# Patient Record
Sex: FEMALE | Race: WHITE | Employment: OTHER | URBAN - METROPOLITAN AREA
[De-identification: names, ages, dates, MRNs, and addresses within clinical notes are randomized per-mention and may not be internally consistent; named-entity substitution may affect disease eponyms.]

---

## 2017-12-01 ENCOUNTER — HOSPITAL ENCOUNTER (EMERGENCY)
Age: 82
Discharge: HOME | End: 2017-12-01
Attending: EMERGENCY MEDICINE
Payer: MEDICARE

## 2017-12-01 VITALS
BODY MASS INDEX: 18.36 KG/M2 | RESPIRATION RATE: 15 BRPM | DIASTOLIC BLOOD PRESSURE: 68 MMHG | HEIGHT: 60 IN | HEART RATE: 67 BPM | SYSTOLIC BLOOD PRESSURE: 167 MMHG | OXYGEN SATURATION: 97 % | WEIGHT: 93.5 LBS

## 2017-12-01 DIAGNOSIS — I16.0 HYPERTENSIVE URGENCY, MALIGNANT: Primary | ICD-10-CM

## 2017-12-01 LAB
ANION GAP SERPL CALC-SCNC: 7 MMOL/L (ref 5–15)
ATRIAL RATE: 89 BPM
BASOPHILS # BLD: 0.1 K/UL (ref 0–0.1)
BASOPHILS NFR BLD: 1 % (ref 0–1)
BUN SERPL-MCNC: 18 MG/DL (ref 6–20)
BUN/CREAT SERPL: 20 (ref 12–20)
CALCIUM SERPL-MCNC: 9.2 MG/DL (ref 8.5–10.1)
CALCULATED R AXIS, ECG10: 84 DEGREES
CALCULATED T AXIS, ECG11: -138 DEGREES
CHLORIDE SERPL-SCNC: 103 MMOL/L (ref 97–108)
CO2 SERPL-SCNC: 29 MMOL/L (ref 21–32)
CREAT SERPL-MCNC: 0.88 MG/DL (ref 0.55–1.02)
DIAGNOSIS, 93000: NORMAL
DIFFERENTIAL METHOD BLD: ABNORMAL
EOSINOPHIL # BLD: 0.1 K/UL (ref 0–0.4)
EOSINOPHIL NFR BLD: 2 % (ref 0–7)
ERYTHROCYTE [DISTWIDTH] IN BLOOD BY AUTOMATED COUNT: 15 % (ref 11.5–14.5)
GLUCOSE SERPL-MCNC: 109 MG/DL (ref 65–100)
HCT VFR BLD AUTO: 37 % (ref 35–47)
HGB BLD-MCNC: 11.3 G/DL (ref 11.5–16)
LYMPHOCYTES # BLD: 2.5 K/UL (ref 0.8–3.5)
LYMPHOCYTES NFR BLD: 27 % (ref 12–49)
MCH RBC QN AUTO: 27.4 PG (ref 26–34)
MCHC RBC AUTO-ENTMCNC: 30.5 G/DL (ref 30–36.5)
MCV RBC AUTO: 89.6 FL (ref 80–99)
MONOCYTES # BLD: 0.7 K/UL (ref 0–1)
MONOCYTES NFR BLD: 7 % (ref 5–13)
NEUTS SEG # BLD: 5.8 K/UL (ref 1.8–8)
NEUTS SEG NFR BLD: 63 % (ref 32–75)
PLATELET # BLD AUTO: 263 K/UL (ref 150–400)
POTASSIUM SERPL-SCNC: 3.9 MMOL/L (ref 3.5–5.1)
Q-T INTERVAL, ECG07: 304 MS
QRS DURATION, ECG06: 76 MS
QTC CALCULATION (BEZET), ECG08: 405 MS
RBC # BLD AUTO: 4.13 M/UL (ref 3.8–5.2)
SODIUM SERPL-SCNC: 139 MMOL/L (ref 136–145)
TROPONIN I SERPL-MCNC: <0.04 NG/ML
VENTRICULAR RATE, ECG03: 107 BPM
WBC # BLD AUTO: 9.1 K/UL (ref 3.6–11)

## 2017-12-01 PROCEDURE — 96374 THER/PROPH/DIAG INJ IV PUSH: CPT

## 2017-12-01 PROCEDURE — 99284 EMERGENCY DEPT VISIT MOD MDM: CPT

## 2017-12-01 PROCEDURE — 93005 ELECTROCARDIOGRAM TRACING: CPT

## 2017-12-01 PROCEDURE — 85025 COMPLETE CBC W/AUTO DIFF WBC: CPT | Performed by: EMERGENCY MEDICINE

## 2017-12-01 PROCEDURE — 74011250637 HC RX REV CODE- 250/637: Performed by: EMERGENCY MEDICINE

## 2017-12-01 PROCEDURE — 84484 ASSAY OF TROPONIN QUANT: CPT | Performed by: EMERGENCY MEDICINE

## 2017-12-01 PROCEDURE — 36415 COLL VENOUS BLD VENIPUNCTURE: CPT | Performed by: EMERGENCY MEDICINE

## 2017-12-01 PROCEDURE — 80048 BASIC METABOLIC PNL TOTAL CA: CPT | Performed by: EMERGENCY MEDICINE

## 2017-12-01 PROCEDURE — 74011000250 HC RX REV CODE- 250: Performed by: EMERGENCY MEDICINE

## 2017-12-01 RX ORDER — DILTIAZEM HYDROCHLORIDE 180 MG/1
180 CAPSULE, COATED, EXTENDED RELEASE ORAL
Status: COMPLETED | OUTPATIENT
Start: 2017-12-01 | End: 2017-12-01

## 2017-12-01 RX ORDER — LISINOPRIL 10 MG/1
10 TABLET ORAL DAILY
COMMUNITY

## 2017-12-01 RX ORDER — DILTIAZEM HYDROCHLORIDE 5 MG/ML
10 INJECTION INTRAVENOUS
Status: COMPLETED | OUTPATIENT
Start: 2017-12-01 | End: 2017-12-01

## 2017-12-01 RX ORDER — MELATONIN 5 MG
5 CAPSULE ORAL
COMMUNITY
End: 2018-01-01

## 2017-12-01 RX ADMIN — DILTIAZEM HYDROCHLORIDE 10 MG: 5 INJECTION INTRAVENOUS at 11:41

## 2017-12-01 RX ADMIN — DILTIAZEM HYDROCHLORIDE 180 MG: 180 CAPSULE, COATED, EXTENDED RELEASE ORAL at 11:35

## 2017-12-01 NOTE — DISCHARGE INSTRUCTIONS
Acute High Blood Pressure: Care Instructions  Your Care Instructions    Acute high blood pressure is very high blood pressure. It's a serious problem. Very high blood pressure can damage your brain, heart, eyes, and kidneys. You may have been given medicines to lower your blood pressure. You may have gotten them as pills or through a needle in one of your veins. This is called an IV. And maybe you were given other medicines too. These can be needed when high blood pressure causes other problems. To keep your blood pressure at a lower level, you may need to make healthy lifestyle changes. And you will probably need to take medicines. Be sure to follow up with your doctor about your blood pressure and what you can do about it. Follow-up care is a key part of your treatment and safety. Be sure to make and go to all appointments, and call your doctor if you are having problems. It's also a good idea to know your test results and keep a list of the medicines you take. How can you care for yourself at home? · See your doctor as often as he or she recommends. This is to make sure your blood pressure is under control. You will probably go at least 2 times a year. But it may be more often at first.  · Take your blood pressure medicine exactly as prescribed. You may take one or more types. They include diuretics, beta-blockers, ACE inhibitors, calcium channel blockers, and angiotensin II receptor blockers. Call your doctor if you think you are having a problem with your medicine. · If you take blood pressure medicine, talk to your doctor before you take decongestants or anti-inflammatory medicine, such as ibuprofen. These can raise blood pressure. · Learn how to check your blood pressure at home. Check it often. · Ask your doctor if it's okay to drink alcohol. · Talk to your doctor about lifestyle changes that can help blood pressure. These include being active and not smoking.   When should you call for help?  Call 911 anytime you think you may need emergency care. This may mean having symptoms that suggest that your blood pressure is causing a serious heart or blood vessel problem. Your blood pressure may be over 180/110. ? For example, call 911 if:  ? · You have symptoms of a heart attack. These may include:  ¨ Chest pain or pressure, or a strange feeling in the chest.  ¨ Sweating. ¨ Shortness of breath. ¨ Nausea or vomiting. ¨ Pain, pressure, or a strange feeling in the back, neck, jaw, or upper belly or in one or both shoulders or arms. ¨ Lightheadedness or sudden weakness. ¨ A fast or irregular heartbeat. ? · You have symptoms of a stroke. These may include:  ¨ Sudden numbness, tingling, weakness, or loss of movement in your face, arm, or leg, especially on only one side of your body. ¨ Sudden vision changes. ¨ Sudden trouble speaking. ¨ Sudden confusion or trouble understanding simple statements. ¨ Sudden problems with walking or balance. ¨ A sudden, severe headache that is different from past headaches. ? · You have severe back or belly pain. ?Do not wait until your blood pressure comes down on its own. Get help right away. ?Call your doctor now or seek immediate care if:  ? · Your blood pressure is much higher than normal (such as 180/110 or higher), but you don't have symptoms. ? · You think high blood pressure is causing symptoms, such as:  ¨ Severe headache. ¨ Blurry vision. ? Watch closely for changes in your health, and be sure to contact your doctor if:  ? · Your blood pressure measures 140/90 or higher at least 2 times. That means the top number is 140 or higher or the bottom number is 90 or higher, or both. ? · You think you may be having side effects from your blood pressure medicine. ? · Your blood pressure is usually normal, but it goes above normal at least 2 times. Where can you learn more? Go to http://froilan-chris.info/.   Enter X016 in the search box to learn more about \"Acute High Blood Pressure: Care Instructions. \"  Current as of: September 21, 2016  Content Version: 11.4  © 3100-7334 [employer], [employer]. Care instructions adapted under license by [employer] (which disclaims liability or warranty for this information). If you have questions about a medical condition or this instruction, always ask your healthcare professional. [place] [address] any warranty or liability for your use of this information.

## 2017-12-01 NOTE — ED TRIAGE NOTES
Patient ambulatory to ED treatment area with steady gait for complaint of \"I was sent here by my PCP because my blood pressure has bene high for about two or three weeks. \" Patient reports shortness of breath and dizziness. Patient reports \"I just do not feel well. \" Denies any fever. Patient reports some coughing. Denies productive cough. Patient's daughter reports that Rolanda Velazquez stopped taking cardia two weeks ago and she was not stop this medication. Lisinopril was started at this time. \"

## 2017-12-01 NOTE — ED PROVIDER NOTES
Patient is a 80 y.o. female presenting with hypertension. The history is provided by the patient. Hypertension    This is a recurrent problem. The current episode started more than 1 week ago. Associated symptoms include shortness of breath. Pertinent negatives include no chest pain, no anxiety, no confusion, no headaches, no neck pain, no dizziness, no nausea and no vomiting. patient was to be taking lisinopril in addition to her cardizem, but she stopped the cardizem by mistake a couple weeks ago    Past Medical History:   Diagnosis Date    Asthma     Cancer ([facility] [address] 2011    LEFT breast     Diabetes ([facility] [address]     Hypertension        Past Surgical History:   Procedure Laterality Date    HX BREAST LUMPECTOMY  2011    LEFT breast     HX LAP CHOLECYSTECTOMY           Family History:   Problem Relation Age of Onset    Cancer Mother      ? TYPE CA    Diabetes Father     Heart Disease Father     Hypertension Father     Lung Disease Paternal Grandfather     Asthma Neg Hx     Malignant Hyperthermia Neg Hx     Pseudocholinesterase Deficiency Neg Hx     Delayed Awakening Neg Hx     Post-op Nausea/Vomiting Neg Hx        Social History     Social History    Marital status:      Spouse name: N/A    Number of children: N/A    Years of education: N/A     Occupational History    Not on file. Social History Main Topics    Smoking status: Never Smoker    Smokeless tobacco: Never Used    Alcohol use No    Drug use: Not on file    Sexual activity: Not on file     Other Topics Concern    Not on file     Social History Narrative         ALLERGIES: Adhesive    Review of Systems   Constitutional: Positive for fatigue. Negative for chills and fever. Respiratory: Positive for shortness of breath. Negative for chest tightness and wheezing. Cardiovascular: Negative for chest pain. Gastrointestinal: Negative for abdominal pain, nausea and vomiting.    Musculoskeletal: Negative for back pain and neck pain.   Neurological: Negative for dizziness and headaches. Psychiatric/Behavioral: Negative for confusion. All other systems reviewed and are negative. Vitals:    17 1118 17 1130 17 1135 17 1200   BP:  (!) 185/108 (!) 185/108 167/68   Pulse: 100 91 85 67   Resp: 19 18  15   SpO2: 99% 100%  97%   Weight:       Height:                Physical Exam   Constitutional: She is oriented to person, place, and time. She appears well-developed and well-nourished. No distress. HENT:   Head: Normocephalic and atraumatic. Cardiovascular: Intact distal pulses. An irregularly irregular rhythm present. Tachycardia present. Pulmonary/Chest: Effort normal and breath sounds normal. No respiratory distress. She has no wheezes. Abdominal: Soft. Bowel sounds are normal. There is no tenderness. Musculoskeletal: Normal range of motion. She exhibits no edema or deformity. Neurological: She is alert and oriented to person, place, and time. Skin: Skin is warm and dry. She is not diaphoretic. Psychiatric: She has a normal mood and affect. Nursing note and vitals reviewed. MDM  Number of Diagnoses or Management Options  Hypertensive urgency, malignant:   Diagnosis management comments: Patient with HTN sent from PCP office for further eval and care of her pressure. Patient had an error in how she was taking her medications. Check labs, give meds for BP and re-eval.  Give IV diltiazem and her oral dose and re-eval    1220pm - no EMC at this time, d/c home. Patient feels better and daughter here with her to take her home. EK17 @ 11:10 unchanged from previous tracings, atrial fibrillation, rate 107, st and t wave abnormalities. No acute process.        Amount and/or Complexity of Data Reviewed  Clinical lab tests: ordered and reviewed  Obtain history from someone other than the patient: yes (daughter)  Independent visualization of images, tracings, or specimens: yes    Critical Care  Total time providing critical care: 30-74 minutes (Total critical care time spent exclusive of procedures:  30 minutes)    Patient Progress  Patient progress: improved    ED Course       Procedures

## 2017-12-01 NOTE — ED NOTES
Pt resting on the stretcher in no distress and talking with family at bedside. V/S reassessed. All results available for review and awaiting further care mgmt. Will continue to monitor.

## 2018-01-01 ENCOUNTER — HOME CARE VISIT (OUTPATIENT)
Dept: SCHEDULING | Facility: HOME HEALTH | Age: 83
End: 2018-01-01
Payer: MEDICARE

## 2018-01-01 ENCOUNTER — HOSPITAL ENCOUNTER (INPATIENT)
Age: 83
LOS: 6 days | Discharge: HOME | DRG: 381 | End: 2018-01-11
Attending: EMERGENCY MEDICINE | Admitting: FAMILY MEDICINE
Payer: MEDICARE

## 2018-01-01 ENCOUNTER — APPOINTMENT (OUTPATIENT)
Dept: GENERAL RADIOLOGY | Age: 83
DRG: 291 | End: 2018-01-01
Attending: EMERGENCY MEDICINE
Payer: MEDICARE

## 2018-01-01 ENCOUNTER — APPOINTMENT (OUTPATIENT)
Dept: CT IMAGING | Age: 83
DRG: 381 | End: 2018-01-01
Attending: FAMILY MEDICINE
Payer: MEDICARE

## 2018-01-01 ENCOUNTER — HOSPITAL ENCOUNTER (INPATIENT)
Age: 83
LOS: 3 days | Discharge: HOME HEALTH | DRG: 291 | End: 2019-01-01
Attending: EMERGENCY MEDICINE | Admitting: INTERNAL MEDICINE
Payer: MEDICARE

## 2018-01-01 ENCOUNTER — HOME CARE VISIT (OUTPATIENT)
Dept: HOME HEALTH SERVICES | Facility: HOME HEALTH | Age: 83
End: 2018-01-01
Payer: MEDICARE

## 2018-01-01 ENCOUNTER — APPOINTMENT (OUTPATIENT)
Dept: GENERAL RADIOLOGY | Age: 83
DRG: 381 | End: 2018-01-01
Attending: INTERNAL MEDICINE
Payer: MEDICARE

## 2018-01-01 ENCOUNTER — HOME HEALTH ADMISSION (OUTPATIENT)
Dept: HOME HEALTH SERVICES | Facility: HOME HEALTH | Age: 83
End: 2018-01-01
Payer: MEDICARE

## 2018-01-01 ENCOUNTER — APPOINTMENT (OUTPATIENT)
Dept: GENERAL RADIOLOGY | Age: 83
DRG: 381 | End: 2018-01-01
Attending: EMERGENCY MEDICINE
Payer: MEDICARE

## 2018-01-01 ENCOUNTER — HOME HEALTH ADMISSION (OUTPATIENT)
Dept: HOME HEALTH SERVICES | Facility: HOME HEALTH | Age: 83
End: 2018-01-01

## 2018-01-01 VITALS
TEMPERATURE: 97.6 F | HEART RATE: 80 BPM | SYSTOLIC BLOOD PRESSURE: 129 MMHG | RESPIRATION RATE: 18 BRPM | OXYGEN SATURATION: 98 % | DIASTOLIC BLOOD PRESSURE: 66 MMHG

## 2018-01-01 VITALS
RESPIRATION RATE: 18 BRPM | DIASTOLIC BLOOD PRESSURE: 60 MMHG | SYSTOLIC BLOOD PRESSURE: 110 MMHG | TEMPERATURE: 97.4 F | HEART RATE: 84 BPM | OXYGEN SATURATION: 95 %

## 2018-01-01 VITALS
DIASTOLIC BLOOD PRESSURE: 57 MMHG | TEMPERATURE: 97.3 F | OXYGEN SATURATION: 95 % | SYSTOLIC BLOOD PRESSURE: 118 MMHG | RESPIRATION RATE: 18 BRPM | HEART RATE: 91 BPM

## 2018-01-01 VITALS
OXYGEN SATURATION: 98 % | SYSTOLIC BLOOD PRESSURE: 122 MMHG | RESPIRATION RATE: 16 BRPM | HEART RATE: 77 BPM | DIASTOLIC BLOOD PRESSURE: 80 MMHG | TEMPERATURE: 98.2 F

## 2018-01-01 VITALS
DIASTOLIC BLOOD PRESSURE: 74 MMHG | RESPIRATION RATE: 16 BRPM | TEMPERATURE: 97.7 F | SYSTOLIC BLOOD PRESSURE: 124 MMHG | OXYGEN SATURATION: 99 % | HEART RATE: 66 BPM

## 2018-01-01 VITALS
RESPIRATION RATE: 18 BRPM | SYSTOLIC BLOOD PRESSURE: 120 MMHG | SYSTOLIC BLOOD PRESSURE: 128 MMHG | DIASTOLIC BLOOD PRESSURE: 78 MMHG | TEMPERATURE: 98 F | RESPIRATION RATE: 18 BRPM | HEART RATE: 88 BPM | HEART RATE: 78 BPM | OXYGEN SATURATION: 99 % | OXYGEN SATURATION: 99 % | DIASTOLIC BLOOD PRESSURE: 70 MMHG | TEMPERATURE: 97 F

## 2018-01-01 VITALS
HEART RATE: 77 BPM | DIASTOLIC BLOOD PRESSURE: 70 MMHG | OXYGEN SATURATION: 98 % | SYSTOLIC BLOOD PRESSURE: 120 MMHG | TEMPERATURE: 97.1 F | RESPIRATION RATE: 16 BRPM

## 2018-01-01 VITALS
OXYGEN SATURATION: 99 % | SYSTOLIC BLOOD PRESSURE: 130 MMHG | RESPIRATION RATE: 16 BRPM | TEMPERATURE: 97.7 F | DIASTOLIC BLOOD PRESSURE: 70 MMHG | HEART RATE: 64 BPM

## 2018-01-01 VITALS
RESPIRATION RATE: 16 BRPM | TEMPERATURE: 97.5 F | SYSTOLIC BLOOD PRESSURE: 120 MMHG | OXYGEN SATURATION: 96 % | HEART RATE: 67 BPM | DIASTOLIC BLOOD PRESSURE: 60 MMHG

## 2018-01-01 VITALS
HEART RATE: 84 BPM | OXYGEN SATURATION: 95 % | DIASTOLIC BLOOD PRESSURE: 62 MMHG | TEMPERATURE: 98.2 F | SYSTOLIC BLOOD PRESSURE: 142 MMHG | RESPIRATION RATE: 18 BRPM

## 2018-01-01 VITALS
OXYGEN SATURATION: 97 % | SYSTOLIC BLOOD PRESSURE: 126 MMHG | SYSTOLIC BLOOD PRESSURE: 120 MMHG | HEART RATE: 78 BPM | RESPIRATION RATE: 18 BRPM | TEMPERATURE: 97.9 F | BODY MASS INDEX: 16.01 KG/M2 | WEIGHT: 103 LBS | OXYGEN SATURATION: 97 % | HEIGHT: 63 IN | RESPIRATION RATE: 18 BRPM | DIASTOLIC BLOOD PRESSURE: 70 MMHG | WEIGHT: 90.39 LBS | DIASTOLIC BLOOD PRESSURE: 80 MMHG | TEMPERATURE: 97.9 F | HEART RATE: 67 BPM | BODY MASS INDEX: 18.25 KG/M2

## 2018-01-01 VITALS
HEIGHT: 63 IN | RESPIRATION RATE: 16 BRPM | SYSTOLIC BLOOD PRESSURE: 136 MMHG | BODY MASS INDEX: 18.11 KG/M2 | OXYGEN SATURATION: 90 % | TEMPERATURE: 97.6 F | HEART RATE: 98 BPM | WEIGHT: 102.2 LBS | DIASTOLIC BLOOD PRESSURE: 93 MMHG

## 2018-01-01 VITALS
DIASTOLIC BLOOD PRESSURE: 70 MMHG | RESPIRATION RATE: 18 BRPM | OXYGEN SATURATION: 95 % | RESPIRATION RATE: 18 BRPM | DIASTOLIC BLOOD PRESSURE: 68 MMHG | HEART RATE: 77 BPM | TEMPERATURE: 98.2 F | SYSTOLIC BLOOD PRESSURE: 135 MMHG | OXYGEN SATURATION: 98 % | SYSTOLIC BLOOD PRESSURE: 118 MMHG | HEART RATE: 70 BPM | TEMPERATURE: 98.3 F

## 2018-01-01 VITALS
TEMPERATURE: 97.9 F | SYSTOLIC BLOOD PRESSURE: 128 MMHG | HEART RATE: 84 BPM | DIASTOLIC BLOOD PRESSURE: 75 MMHG | OXYGEN SATURATION: 97 % | RESPIRATION RATE: 18 BRPM

## 2018-01-01 VITALS
SYSTOLIC BLOOD PRESSURE: 110 MMHG | HEART RATE: 80 BPM | TEMPERATURE: 97.3 F | RESPIRATION RATE: 18 BRPM | DIASTOLIC BLOOD PRESSURE: 70 MMHG | OXYGEN SATURATION: 92 %

## 2018-01-01 VITALS
HEART RATE: 83 BPM | TEMPERATURE: 96.6 F | SYSTOLIC BLOOD PRESSURE: 118 MMHG | RESPIRATION RATE: 18 BRPM | DIASTOLIC BLOOD PRESSURE: 70 MMHG | OXYGEN SATURATION: 97 %

## 2018-01-01 VITALS
HEART RATE: 80 BPM | TEMPERATURE: 97.6 F | RESPIRATION RATE: 16 BRPM | SYSTOLIC BLOOD PRESSURE: 136 MMHG | DIASTOLIC BLOOD PRESSURE: 78 MMHG | OXYGEN SATURATION: 98 %

## 2018-01-01 VITALS
SYSTOLIC BLOOD PRESSURE: 122 MMHG | OXYGEN SATURATION: 92 % | DIASTOLIC BLOOD PRESSURE: 60 MMHG | RESPIRATION RATE: 18 BRPM | HEART RATE: 66 BPM | TEMPERATURE: 97.4 F

## 2018-01-01 VITALS
SYSTOLIC BLOOD PRESSURE: 118 MMHG | TEMPERATURE: 98.2 F | RESPIRATION RATE: 18 BRPM | DIASTOLIC BLOOD PRESSURE: 68 MMHG | HEART RATE: 77 BPM | OXYGEN SATURATION: 98 %

## 2018-01-01 VITALS
SYSTOLIC BLOOD PRESSURE: 128 MMHG | TEMPERATURE: 98.3 F | HEART RATE: 68 BPM | RESPIRATION RATE: 17 BRPM | DIASTOLIC BLOOD PRESSURE: 62 MMHG | OXYGEN SATURATION: 98 %

## 2018-01-01 VITALS
RESPIRATION RATE: 16 BRPM | DIASTOLIC BLOOD PRESSURE: 65 MMHG | SYSTOLIC BLOOD PRESSURE: 110 MMHG | TEMPERATURE: 98 F | HEART RATE: 70 BPM | OXYGEN SATURATION: 96 %

## 2018-01-01 VITALS
DIASTOLIC BLOOD PRESSURE: 74 MMHG | HEART RATE: 78 BPM | OXYGEN SATURATION: 95 % | RESPIRATION RATE: 17 BRPM | TEMPERATURE: 97.3 F | SYSTOLIC BLOOD PRESSURE: 140 MMHG

## 2018-01-01 DIAGNOSIS — R55 SYNCOPE AND COLLAPSE: Primary | ICD-10-CM

## 2018-01-01 DIAGNOSIS — J18.9 COMMUNITY ACQUIRED PNEUMONIA, UNSPECIFIED LATERALITY: ICD-10-CM

## 2018-01-01 DIAGNOSIS — J90 PLEURAL EFFUSION: ICD-10-CM

## 2018-01-01 DIAGNOSIS — D64.9 ANEMIA, UNSPECIFIED TYPE: Primary | ICD-10-CM

## 2018-01-01 LAB
25(OH)D3 SERPL-MCNC: 25.3 NG/ML (ref 30–100)
ABO + RH BLD: NORMAL
ABO + RH BLD: NORMAL
ALBUMIN SERPL-MCNC: 3.5 G/DL (ref 3.5–5)
ALBUMIN SERPL-MCNC: 3.7 G/DL (ref 3.5–5)
ALBUMIN SERPL-MCNC: 3.8 G/DL (ref 3.5–5)
ALBUMIN/GLOB SERPL: 0.9 {RATIO} (ref 1.1–2.2)
ALBUMIN/GLOB SERPL: 1 {RATIO} (ref 1.1–2.2)
ALBUMIN/GLOB SERPL: 1.1 {RATIO} (ref 1.1–2.2)
ALP SERPL-CCNC: 116 U/L (ref 45–117)
ALP SERPL-CCNC: 87 U/L (ref 45–117)
ALP SERPL-CCNC: 99 U/L (ref 45–117)
ALT SERPL-CCNC: 20 U/L (ref 12–78)
ALT SERPL-CCNC: 23 U/L (ref 12–78)
ALT SERPL-CCNC: 28 U/L (ref 12–78)
ANION GAP SERPL CALC-SCNC: 10 MMOL/L (ref 5–15)
ANION GAP SERPL CALC-SCNC: 10 MMOL/L (ref 5–15)
ANION GAP SERPL CALC-SCNC: 8 MMOL/L (ref 5–15)
ANION GAP SERPL CALC-SCNC: 9 MMOL/L (ref 5–15)
APPEARANCE UR: CLEAR
APTT PPP: 22.7 SEC (ref 22.1–32.5)
ARTERIAL PATENCY WRIST A: NO
ARTERIAL PATENCY WRIST A: YES
ARTERIAL PATENCY WRIST A: YES
AST SERPL-CCNC: 23 U/L (ref 15–37)
AST SERPL-CCNC: 25 U/L (ref 15–37)
AST SERPL-CCNC: 28 U/L (ref 15–37)
ATRIAL RATE: 166 BPM
ATRIAL RATE: 82 BPM
ATRIAL RATE: 83 BPM
ATRIAL RATE: 85 BPM
BACTERIA SPEC CULT: NORMAL
BACTERIA URNS QL MICRO: NEGATIVE /HPF
BASE DEFICIT BLD-SCNC: 10 MMOL/L
BASE DEFICIT BLD-SCNC: 8 MMOL/L
BASE EXCESS BLD CALC-SCNC: 0 MMOL/L
BASOPHILS # BLD: 0.1 K/UL (ref 0–0.1)
BASOPHILS # BLD: 0.1 K/UL (ref 0–0.1)
BASOPHILS NFR BLD: 1 % (ref 0–1)
BASOPHILS NFR BLD: 1 % (ref 0–1)
BDY SITE: ABNORMAL
BILIRUB SERPL-MCNC: 0.4 MG/DL (ref 0.2–1)
BILIRUB SERPL-MCNC: 0.5 MG/DL (ref 0.2–1)
BILIRUB SERPL-MCNC: 0.8 MG/DL (ref 0.2–1)
BILIRUB UR QL: NEGATIVE
BLD PROD TYP BPU: NORMAL
BLOOD GROUP ANTIBODIES SERPL: NORMAL
BLOOD GROUP ANTIBODIES SERPL: NORMAL
BNP SERPL-MCNC: 3824 PG/ML (ref 0–450)
BPU ID: NORMAL
BUN SERPL-MCNC: 23 MG/DL (ref 6–20)
BUN SERPL-MCNC: 23 MG/DL (ref 6–20)
BUN SERPL-MCNC: 26 MG/DL (ref 6–20)
BUN SERPL-MCNC: 28 MG/DL (ref 6–20)
BUN SERPL-MCNC: 29 MG/DL (ref 6–20)
BUN SERPL-MCNC: 35 MG/DL (ref 6–20)
BUN/CREAT SERPL: 20 (ref 12–20)
BUN/CREAT SERPL: 21 (ref 12–20)
BUN/CREAT SERPL: 21 (ref 12–20)
BUN/CREAT SERPL: 24 (ref 12–20)
BUN/CREAT SERPL: 25 (ref 12–20)
BUN/CREAT SERPL: 33 (ref 12–20)
CALCIUM SERPL-MCNC: 8.4 MG/DL (ref 8.5–10.1)
CALCIUM SERPL-MCNC: 8.4 MG/DL (ref 8.5–10.1)
CALCIUM SERPL-MCNC: 8.8 MG/DL (ref 8.5–10.1)
CALCIUM SERPL-MCNC: 9 MG/DL (ref 8.5–10.1)
CALCIUM SERPL-MCNC: 9.2 MG/DL (ref 8.5–10.1)
CALCIUM SERPL-MCNC: 9.7 MG/DL (ref 8.5–10.1)
CALCULATED R AXIS, ECG10: 58 DEGREES
CALCULATED R AXIS, ECG10: 74 DEGREES
CALCULATED R AXIS, ECG10: 80 DEGREES
CALCULATED R AXIS, ECG10: 95 DEGREES
CALCULATED T AXIS, ECG11: -133 DEGREES
CALCULATED T AXIS, ECG11: -145 DEGREES
CALCULATED T AXIS, ECG11: -156 DEGREES
CALCULATED T AXIS, ECG11: -164 DEGREES
CC UR VC: NORMAL
CHLORIDE SERPL-SCNC: 103 MMOL/L (ref 97–108)
CHLORIDE SERPL-SCNC: 105 MMOL/L (ref 97–108)
CHLORIDE SERPL-SCNC: 106 MMOL/L (ref 97–108)
CHLORIDE SERPL-SCNC: 107 MMOL/L (ref 97–108)
CHLORIDE SERPL-SCNC: 108 MMOL/L (ref 97–108)
CHLORIDE SERPL-SCNC: 110 MMOL/L (ref 97–108)
CK SERPL-CCNC: 92 U/L (ref 26–192)
CO2 SERPL-SCNC: 21 MMOL/L (ref 21–32)
CO2 SERPL-SCNC: 22 MMOL/L (ref 21–32)
CO2 SERPL-SCNC: 24 MMOL/L (ref 21–32)
CO2 SERPL-SCNC: 25 MMOL/L (ref 21–32)
COLOR UR: ABNORMAL
COMMENT, HOLDF: NORMAL
COMMENT, HOLDF: NORMAL
CREAT SERPL-MCNC: 0.95 MG/DL (ref 0.55–1.02)
CREAT SERPL-MCNC: 1.05 MG/DL (ref 0.55–1.02)
CREAT SERPL-MCNC: 1.13 MG/DL (ref 0.55–1.02)
CREAT SERPL-MCNC: 1.14 MG/DL (ref 0.55–1.02)
CREAT SERPL-MCNC: 1.23 MG/DL (ref 0.55–1.02)
CREAT SERPL-MCNC: 1.36 MG/DL (ref 0.55–1.02)
CROSSMATCH RESULT,%XM: NORMAL
CRP SERPL HS-MCNC: >9.5 MG/L
DIAGNOSIS, 93000: NORMAL
DIFFERENTIAL METHOD BLD: ABNORMAL
DIGOXIN SERPL-MCNC: 0.9 NG/ML (ref 0.9–2)
DIGOXIN SERPL-MCNC: 1.1 NG/ML (ref 0.9–2)
EOSINOPHIL # BLD: 0 K/UL (ref 0–0.4)
EOSINOPHIL # BLD: 0.1 K/UL (ref 0–0.4)
EOSINOPHIL NFR BLD: 0 % (ref 0–7)
EOSINOPHIL NFR BLD: 1 % (ref 0–7)
EPITH CASTS URNS QL MICRO: ABNORMAL /LPF
ERYTHROCYTE [DISTWIDTH] IN BLOOD BY AUTOMATED COUNT: 15.5 % (ref 11.5–14.5)
ERYTHROCYTE [DISTWIDTH] IN BLOOD BY AUTOMATED COUNT: 15.6 % (ref 11.5–14.5)
ERYTHROCYTE [DISTWIDTH] IN BLOOD BY AUTOMATED COUNT: 21.6 % (ref 11.5–14.5)
ERYTHROCYTE [DISTWIDTH] IN BLOOD BY AUTOMATED COUNT: 21.6 % (ref 11.5–14.5)
ERYTHROCYTE [DISTWIDTH] IN BLOOD BY AUTOMATED COUNT: 21.7 % (ref 11.5–14.5)
ERYTHROCYTE [SEDIMENTATION RATE] IN BLOOD: 52 MM/HR (ref 0–30)
GAS FLOW.O2 O2 DELIVERY SYS: ABNORMAL L/MIN
GLOBULIN SER CALC-MCNC: 3.2 G/DL (ref 2–4)
GLOBULIN SER CALC-MCNC: 3.8 G/DL (ref 2–4)
GLOBULIN SER CALC-MCNC: 4.1 G/DL (ref 2–4)
GLUCOSE BLD STRIP.AUTO-MCNC: 109 MG/DL (ref 65–100)
GLUCOSE BLD STRIP.AUTO-MCNC: 165 MG/DL (ref 65–100)
GLUCOSE SERPL-MCNC: 107 MG/DL (ref 65–100)
GLUCOSE SERPL-MCNC: 120 MG/DL (ref 65–100)
GLUCOSE SERPL-MCNC: 121 MG/DL (ref 65–100)
GLUCOSE SERPL-MCNC: 138 MG/DL (ref 65–100)
GLUCOSE SERPL-MCNC: 158 MG/DL (ref 65–100)
GLUCOSE SERPL-MCNC: 85 MG/DL (ref 65–100)
GLUCOSE UR STRIP.AUTO-MCNC: 100 MG/DL
HCO3 BLD-SCNC: 15.9 MMOL/L (ref 22–26)
HCO3 BLD-SCNC: 17.7 MMOL/L (ref 22–26)
HCO3 BLD-SCNC: 23.4 MMOL/L (ref 22–26)
HCT VFR BLD AUTO: 24.8 % (ref 35–47)
HCT VFR BLD AUTO: 24.8 % (ref 35–47)
HCT VFR BLD AUTO: 25.7 % (ref 35–47)
HCT VFR BLD AUTO: 25.8 % (ref 35–47)
HCT VFR BLD AUTO: 26 % (ref 35–47)
HCT VFR BLD AUTO: 27.8 % (ref 35–47)
HCT VFR BLD AUTO: 28.7 % (ref 35–47)
HCT VFR BLD AUTO: 29.4 % (ref 35–47)
HCT VFR BLD AUTO: 30.2 % (ref 35–47)
HCT VFR BLD AUTO: 30.8 % (ref 35–47)
HCT VFR BLD AUTO: 31.7 % (ref 35–47)
HEMOCCULT STL QL: NEGATIVE
HGB BLD-MCNC: 7.3 G/DL (ref 11.5–16)
HGB BLD-MCNC: 7.5 G/DL (ref 11.5–16)
HGB BLD-MCNC: 7.8 G/DL (ref 11.5–16)
HGB BLD-MCNC: 7.8 G/DL (ref 11.5–16)
HGB BLD-MCNC: 7.9 G/DL (ref 11.5–16)
HGB BLD-MCNC: 8.1 G/DL (ref 11.5–16)
HGB BLD-MCNC: 8.5 G/DL (ref 11.5–16)
HGB BLD-MCNC: 8.6 G/DL (ref 11.5–16)
HGB BLD-MCNC: 8.7 G/DL (ref 11.5–16)
HGB BLD-MCNC: 9 G/DL (ref 11.5–16)
HGB BLD-MCNC: 9.1 G/DL (ref 11.5–16)
HGB BLD-MCNC: 9.2 G/DL (ref 11.5–16)
HGB UR QL STRIP: NEGATIVE
HYALINE CASTS URNS QL MICRO: ABNORMAL /LPF (ref 0–5)
IMM GRANULOCYTES # BLD: 0.1 K/UL (ref 0–0.04)
IMM GRANULOCYTES NFR BLD AUTO: 1 % (ref 0–0.5)
INR PPP: 1.2 (ref 0.9–1.1)
IRON SATN MFR SERPL: 9 % (ref 20–50)
IRON SERPL-MCNC: 32 UG/DL (ref 35–150)
KETONES UR QL STRIP.AUTO: NEGATIVE MG/DL
LEUKOCYTE ESTERASE UR QL STRIP.AUTO: ABNORMAL
LYMPHOCYTES # BLD: 1.3 K/UL (ref 0.8–3.5)
LYMPHOCYTES # BLD: 2.2 K/UL (ref 0.8–3.5)
LYMPHOCYTES NFR BLD: 11 % (ref 12–49)
LYMPHOCYTES NFR BLD: 18 % (ref 12–49)
MAGNESIUM SERPL-MCNC: 2 MG/DL (ref 1.6–2.4)
MAGNESIUM SERPL-MCNC: 2 MG/DL (ref 1.6–2.4)
MCH RBC QN AUTO: 25.5 PG (ref 26–34)
MCH RBC QN AUTO: 25.8 PG (ref 26–34)
MCH RBC QN AUTO: 26.1 PG (ref 26–34)
MCH RBC QN AUTO: 26.1 PG (ref 26–34)
MCH RBC QN AUTO: 26.2 PG (ref 26–34)
MCHC RBC AUTO-ENTMCNC: 28.8 G/DL (ref 30–36.5)
MCHC RBC AUTO-ENTMCNC: 29 G/DL (ref 30–36.5)
MCHC RBC AUTO-ENTMCNC: 29.2 G/DL (ref 30–36.5)
MCHC RBC AUTO-ENTMCNC: 29.6 G/DL (ref 30–36.5)
MCHC RBC AUTO-ENTMCNC: 30.9 G/DL (ref 30–36.5)
MCV RBC AUTO: 83.5 FL (ref 80–99)
MCV RBC AUTO: 86.2 FL (ref 80–99)
MCV RBC AUTO: 89.5 FL (ref 80–99)
MCV RBC AUTO: 89.8 FL (ref 80–99)
MCV RBC AUTO: 90.7 FL (ref 80–99)
MONOCYTES # BLD: 0.9 K/UL (ref 0–1)
MONOCYTES # BLD: 1 K/UL (ref 0–1)
MONOCYTES NFR BLD: 7 % (ref 5–13)
MONOCYTES NFR BLD: 8 % (ref 5–13)
NEUTS SEG # BLD: 9.5 K/UL (ref 1.8–8)
NEUTS SEG # BLD: 9.5 K/UL (ref 1.8–8)
NEUTS SEG NFR BLD: 74 % (ref 32–75)
NEUTS SEG NFR BLD: 78 % (ref 32–75)
NITRITE UR QL STRIP.AUTO: NEGATIVE
NRBC # BLD: 0 K/UL (ref 0–0.01)
NRBC BLD-RTO: 0 PER 100 WBC
O2/TOTAL GAS SETTING VFR VENT: 50 %
O2/TOTAL GAS SETTING VFR VENT: 55 %
PCO2 BLD: 30.3 MMHG (ref 35–45)
PCO2 BLD: 30.4 MMHG (ref 35–45)
PCO2 BLD: 31 MMHG (ref 35–45)
PH BLD: 7.33 [PH] (ref 7.35–7.45)
PH BLD: 7.37 [PH] (ref 7.35–7.45)
PH BLD: 7.49 [PH] (ref 7.35–7.45)
PH UR STRIP: 5.5 [PH] (ref 5–8)
PLATELET # BLD AUTO: 268 K/UL (ref 150–400)
PLATELET # BLD AUTO: 297 K/UL (ref 150–400)
PLATELET # BLD AUTO: 297 K/UL (ref 150–400)
PLATELET # BLD AUTO: 317 K/UL (ref 150–400)
PLATELET # BLD AUTO: 337 K/UL (ref 150–400)
PMV BLD AUTO: 10 FL (ref 8.9–12.9)
PMV BLD AUTO: 10.5 FL (ref 8.9–12.9)
PMV BLD AUTO: 9.9 FL (ref 8.9–12.9)
PO2 BLD: 44 MMHG (ref 80–100)
PO2 BLD: 54 MMHG (ref 80–100)
PO2 BLD: 58 MMHG (ref 80–100)
POTASSIUM SERPL-SCNC: 3.5 MMOL/L (ref 3.5–5.1)
POTASSIUM SERPL-SCNC: 3.6 MMOL/L (ref 3.5–5.1)
POTASSIUM SERPL-SCNC: 3.7 MMOL/L (ref 3.5–5.1)
POTASSIUM SERPL-SCNC: 3.8 MMOL/L (ref 3.5–5.1)
POTASSIUM SERPL-SCNC: 3.8 MMOL/L (ref 3.5–5.1)
POTASSIUM SERPL-SCNC: 4 MMOL/L (ref 3.5–5.1)
PROT SERPL-MCNC: 6.7 G/DL (ref 6.4–8.2)
PROT SERPL-MCNC: 7.6 G/DL (ref 6.4–8.2)
PROT SERPL-MCNC: 7.8 G/DL (ref 6.4–8.2)
PROT UR STRIP-MCNC: 30 MG/DL
PROTHROMBIN TIME: 12 SEC (ref 9–11.1)
Q-T INTERVAL, ECG07: 336 MS
Q-T INTERVAL, ECG07: 340 MS
Q-T INTERVAL, ECG07: 350 MS
Q-T INTERVAL, ECG07: 364 MS
QRS DURATION, ECG06: 78 MS
QRS DURATION, ECG06: 78 MS
QRS DURATION, ECG06: 80 MS
QRS DURATION, ECG06: 84 MS
QTC CALCULATION (BEZET), ECG08: 378 MS
QTC CALCULATION (BEZET), ECG08: 392 MS
QTC CALCULATION (BEZET), ECG08: 399 MS
QTC CALCULATION (BEZET), ECG08: 447 MS
RBC # BLD AUTO: 3.33 M/UL (ref 3.8–5.2)
RBC # BLD AUTO: 3.44 M/UL (ref 3.8–5.2)
RBC # BLD AUTO: 3.53 M/UL (ref 3.8–5.2)
RBC #/AREA URNS HPF: ABNORMAL /HPF (ref 0–5)
RBC MORPH BLD: ABNORMAL
RBC MORPH BLD: ABNORMAL
SAMPLES BEING HELD,HOLD: NORMAL
SAMPLES BEING HELD,HOLD: NORMAL
SAO2 % BLD: 84 % (ref 92–97)
SAO2 % BLD: 86 % (ref 92–97)
SAO2 % BLD: 90 % (ref 92–97)
SERVICE CMNT-IMP: ABNORMAL
SERVICE CMNT-IMP: ABNORMAL
SERVICE CMNT-IMP: NORMAL
SODIUM SERPL-SCNC: 137 MMOL/L (ref 136–145)
SODIUM SERPL-SCNC: 138 MMOL/L (ref 136–145)
SODIUM SERPL-SCNC: 138 MMOL/L (ref 136–145)
SODIUM SERPL-SCNC: 140 MMOL/L (ref 136–145)
SODIUM SERPL-SCNC: 141 MMOL/L (ref 136–145)
SODIUM SERPL-SCNC: 142 MMOL/L (ref 136–145)
SP GR UR REFRACTOMETRY: 1.02 (ref 1–1.03)
SPECIMEN EXP DATE BLD: NORMAL
SPECIMEN EXP DATE BLD: NORMAL
SPECIMEN TYPE: ABNORMAL
STATUS OF UNIT,%ST: NORMAL
THERAPEUTIC RANGE,PTTT: NORMAL SECS (ref 58–77)
TIBC SERPL-MCNC: 376 UG/DL (ref 250–450)
TOTAL RESP. RATE, ITRR: 16
TROPONIN I SERPL-MCNC: 0.05 NG/ML
TROPONIN I SERPL-MCNC: 0.4 NG/ML
TROPONIN I SERPL-MCNC: 0.67 NG/ML
TROPONIN I SERPL-MCNC: <0.05 NG/ML
TSH SERPL DL<=0.05 MIU/L-ACNC: 0.48 UIU/ML (ref 0.36–3.74)
TSH SERPL DL<=0.05 MIU/L-ACNC: 0.67 UIU/ML (ref 0.36–3.74)
UA: UC IF INDICATED,UAUC: ABNORMAL
UNIT DIVISION, %UDIV: 0
UROBILINOGEN UR QL STRIP.AUTO: 1 EU/DL (ref 0.2–1)
VENTRICULAR RATE, ECG03: 76 BPM
VENTRICULAR RATE, ECG03: 78 BPM
VENTRICULAR RATE, ECG03: 80 BPM
VENTRICULAR RATE, ECG03: 91 BPM
WBC # BLD AUTO: 11.5 K/UL (ref 3.6–11)
WBC # BLD AUTO: 12.1 K/UL (ref 3.6–11)
WBC # BLD AUTO: 12.7 K/UL (ref 3.6–11)
WBC # BLD AUTO: 14.8 K/UL (ref 3.6–11)
WBC # BLD AUTO: 15 K/UL (ref 3.6–11)
WBC URNS QL MICRO: ABNORMAL /HPF (ref 0–4)

## 2018-01-01 PROCEDURE — 0DJ08ZZ INSPECTION OF UPPER INTESTINAL TRACT, VIA NATURAL OR ARTIFICIAL OPENING ENDOSCOPIC: ICD-10-PCS | Performed by: INTERNAL MEDICINE

## 2018-01-01 PROCEDURE — 3331090001 HH PPS REVENUE CREDIT

## 2018-01-01 PROCEDURE — 36600 WITHDRAWAL OF ARTERIAL BLOOD: CPT

## 2018-01-01 PROCEDURE — 71275 CT ANGIOGRAPHY CHEST: CPT

## 2018-01-01 PROCEDURE — 74011250636 HC RX REV CODE- 250/636: Performed by: FAMILY MEDICINE

## 2018-01-01 PROCEDURE — 80048 BASIC METABOLIC PNL TOTAL CA: CPT

## 2018-01-01 PROCEDURE — 74011250637 HC RX REV CODE- 250/637: Performed by: PHYSICIAN ASSISTANT

## 2018-01-01 PROCEDURE — 74011250637 HC RX REV CODE- 250/637: Performed by: FAMILY MEDICINE

## 2018-01-01 PROCEDURE — 74011000250 HC RX REV CODE- 250: Performed by: FAMILY MEDICINE

## 2018-01-01 PROCEDURE — 87086 URINE CULTURE/COLONY COUNT: CPT

## 2018-01-01 PROCEDURE — 65660000000 HC RM CCU STEPDOWN

## 2018-01-01 PROCEDURE — 74011250637 HC RX REV CODE- 250/637: Performed by: NURSE PRACTITIONER

## 2018-01-01 PROCEDURE — 3331090002 HH PPS REVENUE DEBIT

## 2018-01-01 PROCEDURE — 36415 COLL VENOUS BLD VENIPUNCTURE: CPT | Performed by: INTERNAL MEDICINE

## 2018-01-01 PROCEDURE — G0300 HHS/HOSPICE OF LPN EA 15 MIN: HCPCS

## 2018-01-01 PROCEDURE — 82803 BLOOD GASES ANY COMBINATION: CPT

## 2018-01-01 PROCEDURE — 74011250637 HC RX REV CODE- 250/637: Performed by: INTERNAL MEDICINE

## 2018-01-01 PROCEDURE — 85025 COMPLETE CBC W/AUTO DIFF WBC: CPT | Performed by: EMERGENCY MEDICINE

## 2018-01-01 PROCEDURE — G0151 HHCP-SERV OF PT,EA 15 MIN: HCPCS

## 2018-01-01 PROCEDURE — 74011250636 HC RX REV CODE- 250/636: Performed by: EMERGENCY MEDICINE

## 2018-01-01 PROCEDURE — P9016 RBC LEUKOCYTES REDUCED: HCPCS | Performed by: EMERGENCY MEDICINE

## 2018-01-01 PROCEDURE — 84443 ASSAY THYROID STIM HORMONE: CPT | Performed by: FAMILY MEDICINE

## 2018-01-01 PROCEDURE — 74011250636 HC RX REV CODE- 250/636: Performed by: HOSPITALIST

## 2018-01-01 PROCEDURE — 400013 HH SOC

## 2018-01-01 PROCEDURE — 82550 ASSAY OF CK (CPK): CPT | Performed by: EMERGENCY MEDICINE

## 2018-01-01 PROCEDURE — 85730 THROMBOPLASTIN TIME PARTIAL: CPT | Performed by: EMERGENCY MEDICINE

## 2018-01-01 PROCEDURE — 71046 X-RAY EXAM CHEST 2 VIEWS: CPT

## 2018-01-01 PROCEDURE — 74011000258 HC RX REV CODE- 258: Performed by: EMERGENCY MEDICINE

## 2018-01-01 PROCEDURE — 83880 ASSAY OF NATRIURETIC PEPTIDE: CPT

## 2018-01-01 PROCEDURE — 85652 RBC SED RATE AUTOMATED: CPT

## 2018-01-01 PROCEDURE — G0152 HHCP-SERV OF OT,EA 15 MIN: HCPCS

## 2018-01-01 PROCEDURE — 74011000258 HC RX REV CODE- 258: Performed by: INTERNAL MEDICINE

## 2018-01-01 PROCEDURE — 65270000032 HC RM SEMIPRIVATE

## 2018-01-01 PROCEDURE — 30233N1 TRANSFUSION OF NONAUTOLOGOUS RED BLOOD CELLS INTO PERIPHERAL VEIN, PERCUTANEOUS APPROACH: ICD-10-PCS | Performed by: HOSPITALIST

## 2018-01-01 PROCEDURE — 77030032490 HC SLV COMPR SCD KNE COVD -B

## 2018-01-01 PROCEDURE — 86141 C-REACTIVE PROTEIN HS: CPT

## 2018-01-01 PROCEDURE — 97535 SELF CARE MNGMENT TRAINING: CPT

## 2018-01-01 PROCEDURE — 84484 ASSAY OF TROPONIN QUANT: CPT

## 2018-01-01 PROCEDURE — 97116 GAIT TRAINING THERAPY: CPT

## 2018-01-01 PROCEDURE — C9113 INJ PANTOPRAZOLE SODIUM, VIA: HCPCS | Performed by: FAMILY MEDICINE

## 2018-01-01 PROCEDURE — 83540 ASSAY OF IRON: CPT

## 2018-01-01 PROCEDURE — 93306 TTE W/DOPPLER COMPLETE: CPT

## 2018-01-01 PROCEDURE — 36415 COLL VENOUS BLD VENIPUNCTURE: CPT

## 2018-01-01 PROCEDURE — 74011250636 HC RX REV CODE- 250/636: Performed by: INTERNAL MEDICINE

## 2018-01-01 PROCEDURE — G8988 SELF CARE GOAL STATUS: HCPCS

## 2018-01-01 PROCEDURE — G8987 SELF CARE CURRENT STATUS: HCPCS

## 2018-01-01 PROCEDURE — 93005 ELECTROCARDIOGRAM TRACING: CPT

## 2018-01-01 PROCEDURE — 94760 N-INVAS EAR/PLS OXIMETRY 1: CPT

## 2018-01-01 PROCEDURE — 82272 OCCULT BLD FECES 1-3 TESTS: CPT | Performed by: EMERGENCY MEDICINE

## 2018-01-01 PROCEDURE — 82306 VITAMIN D 25 HYDROXY: CPT

## 2018-01-01 PROCEDURE — G0299 HHS/HOSPICE OF RN EA 15 MIN: HCPCS

## 2018-01-01 PROCEDURE — 94762 N-INVAS EAR/PLS OXIMTRY CONT: CPT

## 2018-01-01 PROCEDURE — 77010033678 HC OXYGEN DAILY

## 2018-01-01 PROCEDURE — 80053 COMPREHEN METABOLIC PANEL: CPT

## 2018-01-01 PROCEDURE — G0157 HHC PT ASSISTANT EA 15: HCPCS

## 2018-01-01 PROCEDURE — 97161 PT EVAL LOW COMPLEX 20 MIN: CPT

## 2018-01-01 PROCEDURE — 80053 COMPREHEN METABOLIC PANEL: CPT | Performed by: FAMILY MEDICINE

## 2018-01-01 PROCEDURE — 85018 HEMOGLOBIN: CPT | Performed by: FAMILY MEDICINE

## 2018-01-01 PROCEDURE — 94660 CPAP INITIATION&MGMT: CPT

## 2018-01-01 PROCEDURE — 94640 AIRWAY INHALATION TREATMENT: CPT

## 2018-01-01 PROCEDURE — 86900 BLOOD TYPING SEROLOGIC ABO: CPT | Performed by: INTERNAL MEDICINE

## 2018-01-01 PROCEDURE — 77030013992 HC SNR POLYP ENDOSC BSC -B: Performed by: INTERNAL MEDICINE

## 2018-01-01 PROCEDURE — 36415 COLL VENOUS BLD VENIPUNCTURE: CPT | Performed by: FAMILY MEDICINE

## 2018-01-01 PROCEDURE — 71045 X-RAY EXAM CHEST 1 VIEW: CPT

## 2018-01-01 PROCEDURE — 36430 TRANSFUSION BLD/BLD COMPNT: CPT

## 2018-01-01 PROCEDURE — G8979 MOBILITY GOAL STATUS: HCPCS

## 2018-01-01 PROCEDURE — 81001 URINALYSIS AUTO W/SCOPE: CPT

## 2018-01-01 PROCEDURE — 80061 LIPID PANEL: CPT | Performed by: FAMILY MEDICINE

## 2018-01-01 PROCEDURE — G0158 HHC OT ASSISTANT EA 15: HCPCS

## 2018-01-01 PROCEDURE — 86923 COMPATIBILITY TEST ELECTRIC: CPT | Performed by: EMERGENCY MEDICINE

## 2018-01-01 PROCEDURE — 84484 ASSAY OF TROPONIN QUANT: CPT | Performed by: EMERGENCY MEDICINE

## 2018-01-01 PROCEDURE — 80162 ASSAY OF DIGOXIN TOTAL: CPT | Performed by: INTERNAL MEDICINE

## 2018-01-01 PROCEDURE — 97530 THERAPEUTIC ACTIVITIES: CPT

## 2018-01-01 PROCEDURE — G8978 MOBILITY CURRENT STATUS: HCPCS

## 2018-01-01 PROCEDURE — 80162 ASSAY OF DIGOXIN TOTAL: CPT

## 2018-01-01 PROCEDURE — 84443 ASSAY THYROID STIM HORMONE: CPT

## 2018-01-01 PROCEDURE — 77030029684 HC NEB SM VOL KT MONA -A

## 2018-01-01 PROCEDURE — 85027 COMPLETE CBC AUTOMATED: CPT

## 2018-01-01 PROCEDURE — 80048 BASIC METABOLIC PNL TOTAL CA: CPT | Performed by: INTERNAL MEDICINE

## 2018-01-01 PROCEDURE — 85025 COMPLETE CBC W/AUTO DIFF WBC: CPT

## 2018-01-01 PROCEDURE — 65270000029 HC RM PRIVATE

## 2018-01-01 PROCEDURE — 85018 HEMOGLOBIN: CPT | Performed by: INTERNAL MEDICINE

## 2018-01-01 PROCEDURE — 74011636320 HC RX REV CODE- 636/320: Performed by: EMERGENCY MEDICINE

## 2018-01-01 PROCEDURE — 80053 COMPREHEN METABOLIC PANEL: CPT | Performed by: EMERGENCY MEDICINE

## 2018-01-01 PROCEDURE — 82962 GLUCOSE BLOOD TEST: CPT

## 2018-01-01 PROCEDURE — 74011000250 HC RX REV CODE- 250

## 2018-01-01 PROCEDURE — 99285 EMERGENCY DEPT VISIT HI MDM: CPT

## 2018-01-01 PROCEDURE — 3331090003 HH PPS REVENUE ADJ

## 2018-01-01 PROCEDURE — 85027 COMPLETE CBC AUTOMATED: CPT | Performed by: INTERNAL MEDICINE

## 2018-01-01 PROCEDURE — 97165 OT EVAL LOW COMPLEX 30 MIN: CPT

## 2018-01-01 PROCEDURE — 85610 PROTHROMBIN TIME: CPT | Performed by: EMERGENCY MEDICINE

## 2018-01-01 PROCEDURE — 51798 US URINE CAPACITY MEASURE: CPT

## 2018-01-01 PROCEDURE — 99283 EMERGENCY DEPT VISIT LOW MDM: CPT

## 2018-01-01 PROCEDURE — 77030012879 HC MSK CPAP FLL FAC PHIL -B

## 2018-01-01 PROCEDURE — 83735 ASSAY OF MAGNESIUM: CPT

## 2018-01-01 PROCEDURE — 83735 ASSAY OF MAGNESIUM: CPT | Performed by: INTERNAL MEDICINE

## 2018-01-01 PROCEDURE — 76040000019: Performed by: INTERNAL MEDICINE

## 2018-01-01 PROCEDURE — 65610000006 HC RM INTENSIVE CARE

## 2018-01-01 PROCEDURE — 86850 RBC ANTIBODY SCREEN: CPT | Performed by: EMERGENCY MEDICINE

## 2018-01-01 RX ORDER — MIDAZOLAM HYDROCHLORIDE 1 MG/ML
.25-1 INJECTION, SOLUTION INTRAMUSCULAR; INTRAVENOUS
Status: DISCONTINUED | OUTPATIENT
Start: 2018-01-01 | End: 2018-01-01 | Stop reason: HOSPADM

## 2018-01-01 RX ORDER — SODIUM CHLORIDE 0.9 % (FLUSH) 0.9 %
5-10 SYRINGE (ML) INJECTION AS NEEDED
Status: ACTIVE | OUTPATIENT
Start: 2018-01-01 | End: 2018-01-01

## 2018-01-01 RX ORDER — SUCRALFATE 1 G/10ML
1 SUSPENSION ORAL
Status: DISCONTINUED | OUTPATIENT
Start: 2018-01-01 | End: 2018-01-01 | Stop reason: HOSPADM

## 2018-01-01 RX ORDER — LISINOPRIL 10 MG/1
10 TABLET ORAL DAILY
Status: DISCONTINUED | OUTPATIENT
Start: 2018-01-01 | End: 2019-01-01 | Stop reason: HOSPADM

## 2018-01-01 RX ORDER — SODIUM CHLORIDE 0.9 % (FLUSH) 0.9 %
5-10 SYRINGE (ML) INJECTION AS NEEDED
Status: DISCONTINUED | OUTPATIENT
Start: 2018-01-01 | End: 2018-01-01 | Stop reason: HOSPADM

## 2018-01-01 RX ORDER — ACETAMINOPHEN 325 MG/1
650 TABLET ORAL
Status: DISCONTINUED | OUTPATIENT
Start: 2018-01-01 | End: 2019-01-01 | Stop reason: HOSPADM

## 2018-01-01 RX ORDER — FLUMAZENIL 0.1 MG/ML
0.2 INJECTION INTRAVENOUS
Status: DISCONTINUED | OUTPATIENT
Start: 2018-01-01 | End: 2018-01-01 | Stop reason: HOSPADM

## 2018-01-01 RX ORDER — SODIUM CHLORIDE 0.9 % (FLUSH) 0.9 %
5-10 SYRINGE (ML) INJECTION AS NEEDED
Status: DISCONTINUED | OUTPATIENT
Start: 2018-01-01 | End: 2019-01-01 | Stop reason: HOSPADM

## 2018-01-01 RX ORDER — IPRATROPIUM BROMIDE AND ALBUTEROL SULFATE 2.5; .5 MG/3ML; MG/3ML
3 SOLUTION RESPIRATORY (INHALATION)
Status: DISCONTINUED | OUTPATIENT
Start: 2018-01-01 | End: 2018-01-01 | Stop reason: HOSPADM

## 2018-01-01 RX ORDER — FUROSEMIDE 20 MG/1
20 TABLET ORAL DAILY
Status: DISCONTINUED | OUTPATIENT
Start: 2019-01-01 | End: 2018-01-01

## 2018-01-01 RX ORDER — DILTIAZEM HYDROCHLORIDE 180 MG/1
180 CAPSULE, COATED, EXTENDED RELEASE ORAL DAILY
Status: DISCONTINUED | OUTPATIENT
Start: 2018-01-01 | End: 2018-01-01 | Stop reason: SDUPTHER

## 2018-01-01 RX ORDER — FUROSEMIDE 20 MG/1
20 TABLET ORAL 2 TIMES DAILY
Status: DISCONTINUED | OUTPATIENT
Start: 2018-01-01 | End: 2019-01-01 | Stop reason: HOSPADM

## 2018-01-01 RX ORDER — SODIUM CHLORIDE 0.9 % (FLUSH) 0.9 %
5-10 SYRINGE (ML) INJECTION EVERY 8 HOURS
Status: DISCONTINUED | OUTPATIENT
Start: 2018-01-01 | End: 2018-01-01 | Stop reason: HOSPADM

## 2018-01-01 RX ORDER — DIGOXIN 125 MCG
0.12 TABLET ORAL EVERY OTHER DAY
Status: DISCONTINUED | OUTPATIENT
Start: 2018-01-01 | End: 2018-01-01

## 2018-01-01 RX ORDER — DILTIAZEM HYDROCHLORIDE 180 MG/1
180 CAPSULE, COATED, EXTENDED RELEASE ORAL DAILY
Status: DISCONTINUED | OUTPATIENT
Start: 2018-01-01 | End: 2018-01-01 | Stop reason: HOSPADM

## 2018-01-01 RX ORDER — GABAPENTIN 300 MG/1
300 CAPSULE ORAL
Status: DISCONTINUED | OUTPATIENT
Start: 2018-01-01 | End: 2018-01-01 | Stop reason: HOSPADM

## 2018-01-01 RX ORDER — ALBUTEROL SULFATE 0.83 MG/ML
1.25 SOLUTION RESPIRATORY (INHALATION)
Status: DISCONTINUED | OUTPATIENT
Start: 2018-01-01 | End: 2018-01-01

## 2018-01-01 RX ORDER — GABAPENTIN 300 MG/1
300 CAPSULE ORAL
Status: DISCONTINUED | OUTPATIENT
Start: 2018-01-01 | End: 2019-01-01 | Stop reason: HOSPADM

## 2018-01-01 RX ORDER — SODIUM CHLORIDE 9 MG/ML
100 INJECTION, SOLUTION INTRAVENOUS CONTINUOUS
Status: DISCONTINUED | OUTPATIENT
Start: 2018-01-01 | End: 2018-01-01

## 2018-01-01 RX ORDER — ATROPINE SULFATE 0.1 MG/ML
0.5 INJECTION INTRAVENOUS
Status: DISCONTINUED | OUTPATIENT
Start: 2018-01-01 | End: 2018-01-01 | Stop reason: HOSPADM

## 2018-01-01 RX ORDER — DILTIAZEM HYDROCHLORIDE 180 MG/1
180 CAPSULE, COATED, EXTENDED RELEASE ORAL DAILY
Status: DISCONTINUED | OUTPATIENT
Start: 2018-01-01 | End: 2019-01-01 | Stop reason: HOSPADM

## 2018-01-01 RX ORDER — GABAPENTIN 300 MG/1
600 CAPSULE ORAL
Status: DISCONTINUED | OUTPATIENT
Start: 2018-01-01 | End: 2018-01-01 | Stop reason: HOSPADM

## 2018-01-01 RX ORDER — EPINEPHRINE 0.1 MG/ML
1 INJECTION INTRACARDIAC; INTRAVENOUS
Status: DISCONTINUED | OUTPATIENT
Start: 2018-01-01 | End: 2018-01-01 | Stop reason: HOSPADM

## 2018-01-01 RX ORDER — SODIUM CHLORIDE 0.9 % (FLUSH) 0.9 %
5-10 SYRINGE (ML) INJECTION EVERY 8 HOURS
Status: COMPLETED | OUTPATIENT
Start: 2018-01-01 | End: 2018-01-01

## 2018-01-01 RX ORDER — ASPIRIN 81 MG/1
81 TABLET ORAL EVERY EVENING
COMMUNITY
End: 2018-01-01

## 2018-01-01 RX ORDER — HYDRALAZINE HYDROCHLORIDE 10 MG/1
10 TABLET, FILM COATED ORAL
Status: DISCONTINUED | OUTPATIENT
Start: 2018-01-01 | End: 2018-01-01 | Stop reason: HOSPADM

## 2018-01-01 RX ORDER — ACETAMINOPHEN 325 MG/1
650 TABLET ORAL
Status: DISCONTINUED | OUTPATIENT
Start: 2018-01-01 | End: 2018-01-01 | Stop reason: HOSPADM

## 2018-01-01 RX ORDER — ADHESIVE BANDAGE
30 BANDAGE TOPICAL DAILY PRN
Status: DISCONTINUED | OUTPATIENT
Start: 2018-01-01 | End: 2019-01-01 | Stop reason: HOSPADM

## 2018-01-01 RX ORDER — MONTELUKAST SODIUM 10 MG/1
10 TABLET ORAL DAILY
Status: DISCONTINUED | OUTPATIENT
Start: 2018-01-01 | End: 2018-01-01 | Stop reason: HOSPADM

## 2018-01-01 RX ORDER — SODIUM CHLORIDE 9 MG/ML
100 INJECTION, SOLUTION INTRAVENOUS CONTINUOUS
Status: DISPENSED | OUTPATIENT
Start: 2018-01-01 | End: 2018-01-01

## 2018-01-01 RX ORDER — HEPARIN SODIUM 5000 [USP'U]/ML
5000 INJECTION, SOLUTION INTRAVENOUS; SUBCUTANEOUS EVERY 12 HOURS
Status: DISCONTINUED | OUTPATIENT
Start: 2018-01-01 | End: 2019-01-01 | Stop reason: HOSPADM

## 2018-01-01 RX ORDER — SODIUM CHLORIDE 9 MG/ML
250 INJECTION, SOLUTION INTRAVENOUS AS NEEDED
Status: DISCONTINUED | OUTPATIENT
Start: 2018-01-01 | End: 2018-01-01

## 2018-01-01 RX ORDER — IPRATROPIUM BROMIDE AND ALBUTEROL SULFATE 2.5; .5 MG/3ML; MG/3ML
3 SOLUTION RESPIRATORY (INHALATION)
Status: DISCONTINUED | OUTPATIENT
Start: 2018-01-01 | End: 2018-01-01

## 2018-01-01 RX ORDER — NITROGLYCERIN 0.4 MG/1
0.4 TABLET SUBLINGUAL
Status: DISCONTINUED | OUTPATIENT
Start: 2018-01-01 | End: 2018-01-01 | Stop reason: HOSPADM

## 2018-01-01 RX ORDER — FUROSEMIDE 20 MG/1
20 TABLET ORAL
Status: ON HOLD | COMMUNITY
End: 2019-01-01 | Stop reason: SDUPTHER

## 2018-01-01 RX ORDER — FUROSEMIDE 20 MG/1
20 TABLET ORAL DAILY
Status: DISCONTINUED | OUTPATIENT
Start: 2018-01-01 | End: 2018-01-01

## 2018-01-01 RX ORDER — NALOXONE HYDROCHLORIDE 0.4 MG/ML
0.4 INJECTION, SOLUTION INTRAMUSCULAR; INTRAVENOUS; SUBCUTANEOUS
Status: DISCONTINUED | OUTPATIENT
Start: 2018-01-01 | End: 2018-01-01 | Stop reason: HOSPADM

## 2018-01-01 RX ORDER — LEVOFLOXACIN 5 MG/ML
750 INJECTION, SOLUTION INTRAVENOUS
Status: COMPLETED | OUTPATIENT
Start: 2018-01-01 | End: 2018-01-01

## 2018-01-01 RX ORDER — FUROSEMIDE 10 MG/ML
20 INJECTION INTRAMUSCULAR; INTRAVENOUS DAILY
Status: DISCONTINUED | OUTPATIENT
Start: 2018-01-01 | End: 2018-01-01 | Stop reason: HOSPADM

## 2018-01-01 RX ORDER — SODIUM CHLORIDE 9 MG/ML
50 INJECTION, SOLUTION INTRAVENOUS CONTINUOUS
Status: DISCONTINUED | OUTPATIENT
Start: 2018-01-01 | End: 2018-01-01

## 2018-01-01 RX ORDER — PANTOPRAZOLE SODIUM 40 MG/1
40 TABLET, DELAYED RELEASE ORAL DAILY
Status: DISCONTINUED | OUTPATIENT
Start: 2018-01-01 | End: 2018-01-01

## 2018-01-01 RX ORDER — FENTANYL CITRATE 50 UG/ML
200 INJECTION, SOLUTION INTRAMUSCULAR; INTRAVENOUS
Status: DISCONTINUED | OUTPATIENT
Start: 2018-01-01 | End: 2018-01-01 | Stop reason: HOSPADM

## 2018-01-01 RX ORDER — GABAPENTIN 300 MG/1
600 CAPSULE ORAL EVERY EVENING
Status: DISCONTINUED | OUTPATIENT
Start: 2018-01-01 | End: 2019-01-01 | Stop reason: HOSPADM

## 2018-01-01 RX ORDER — FUROSEMIDE 10 MG/ML
20 INJECTION INTRAMUSCULAR; INTRAVENOUS ONCE
Status: COMPLETED | OUTPATIENT
Start: 2018-01-01 | End: 2018-01-01

## 2018-01-01 RX ORDER — ALPRAZOLAM 0.5 MG/1
0.25 TABLET ORAL ONCE
Status: COMPLETED | OUTPATIENT
Start: 2018-01-01 | End: 2018-01-01

## 2018-01-01 RX ORDER — LISINOPRIL 10 MG/1
10 TABLET ORAL DAILY
Status: DISCONTINUED | OUTPATIENT
Start: 2018-01-01 | End: 2018-01-01 | Stop reason: HOSPADM

## 2018-01-01 RX ORDER — MONTELUKAST SODIUM 10 MG/1
10 TABLET ORAL EVERY EVENING
Status: DISCONTINUED | OUTPATIENT
Start: 2018-01-01 | End: 2019-01-01 | Stop reason: HOSPADM

## 2018-01-01 RX ORDER — DILTIAZEM HYDROCHLORIDE 180 MG/1
180 CAPSULE, COATED, EXTENDED RELEASE ORAL DAILY
COMMUNITY

## 2018-01-01 RX ORDER — SODIUM CHLORIDE 0.9 % (FLUSH) 0.9 %
10 SYRINGE (ML) INJECTION
Status: COMPLETED | OUTPATIENT
Start: 2018-01-01 | End: 2018-01-01

## 2018-01-01 RX ORDER — PANTOPRAZOLE SODIUM 40 MG/1
40 TABLET, DELAYED RELEASE ORAL DAILY
Status: DISCONTINUED | OUTPATIENT
Start: 2018-01-01 | End: 2019-01-01 | Stop reason: HOSPADM

## 2018-01-01 RX ORDER — SODIUM CHLORIDE 0.9 % (FLUSH) 0.9 %
5-10 SYRINGE (ML) INJECTION EVERY 8 HOURS
Status: DISCONTINUED | OUTPATIENT
Start: 2018-01-01 | End: 2019-01-01 | Stop reason: HOSPADM

## 2018-01-01 RX ORDER — DEXTROMETHORPHAN/PSEUDOEPHED 2.5-7.5/.8
1.2 DROPS ORAL
Status: DISCONTINUED | OUTPATIENT
Start: 2018-01-01 | End: 2018-01-01 | Stop reason: HOSPADM

## 2018-01-01 RX ORDER — DIGOXIN 125 MCG
0.12 TABLET ORAL EVERY OTHER DAY
Status: DISCONTINUED | OUTPATIENT
Start: 2018-01-01 | End: 2019-01-01 | Stop reason: HOSPADM

## 2018-01-01 RX ORDER — GABAPENTIN 300 MG/1
600 CAPSULE ORAL EVERY EVENING
COMMUNITY

## 2018-01-01 RX ORDER — BACITRACIN 500 UNIT/G
PACKET (EA) TOPICAL
Status: COMPLETED
Start: 2018-01-01 | End: 2018-01-01

## 2018-01-01 RX ORDER — FUROSEMIDE 10 MG/ML
20 INJECTION INTRAMUSCULAR; INTRAVENOUS EVERY 12 HOURS
Status: DISCONTINUED | OUTPATIENT
Start: 2018-01-01 | End: 2018-01-01

## 2018-01-01 RX ORDER — LORAZEPAM 0.5 MG/1
0.5 TABLET ORAL
Status: DISCONTINUED | OUTPATIENT
Start: 2018-01-01 | End: 2018-01-01 | Stop reason: HOSPADM

## 2018-01-01 RX ORDER — METOPROLOL TARTRATE 25 MG/1
25 TABLET, FILM COATED ORAL 2 TIMES DAILY
Status: DISCONTINUED | OUTPATIENT
Start: 2018-01-01 | End: 2018-01-01

## 2018-01-01 RX ADMIN — IPRATROPIUM BROMIDE AND ALBUTEROL SULFATE 3 ML: .5; 3 SOLUTION RESPIRATORY (INHALATION) at 21:42

## 2018-01-01 RX ADMIN — HEPARIN SODIUM 5000 UNITS: 5000 INJECTION INTRAVENOUS; SUBCUTANEOUS at 21:17

## 2018-01-01 RX ADMIN — GABAPENTIN 300 MG: 300 CAPSULE ORAL at 07:00

## 2018-01-01 RX ADMIN — GABAPENTIN 600 MG: 300 CAPSULE ORAL at 21:00

## 2018-01-01 RX ADMIN — SODIUM CHLORIDE 40 MG: 9 INJECTION INTRAMUSCULAR; INTRAVENOUS; SUBCUTANEOUS at 10:06

## 2018-01-01 RX ADMIN — Medication 10 ML: at 06:09

## 2018-01-01 RX ADMIN — SODIUM CHLORIDE 40 MG: 9 INJECTION INTRAMUSCULAR; INTRAVENOUS; SUBCUTANEOUS at 18:55

## 2018-01-01 RX ADMIN — DILTIAZEM HYDROCHLORIDE 180 MG: 180 CAPSULE, COATED, EXTENDED RELEASE ORAL at 09:24

## 2018-01-01 RX ADMIN — SUCRALFATE 1 G: 1 SUSPENSION ORAL at 11:30

## 2018-01-01 RX ADMIN — SODIUM CHLORIDE 40 MG: 9 INJECTION INTRAMUSCULAR; INTRAVENOUS; SUBCUTANEOUS at 17:33

## 2018-01-01 RX ADMIN — LORAZEPAM 0.5 MG: 0.5 TABLET ORAL at 04:53

## 2018-01-01 RX ADMIN — ACETAMINOPHEN 650 MG: 325 TABLET, FILM COATED ORAL at 21:30

## 2018-01-01 RX ADMIN — DILTIAZEM HYDROCHLORIDE 180 MG: 180 CAPSULE, COATED, EXTENDED RELEASE ORAL at 10:05

## 2018-01-01 RX ADMIN — Medication 10 ML: at 13:47

## 2018-01-01 RX ADMIN — SUCRALFATE 1 G: 1 SUSPENSION ORAL at 21:00

## 2018-01-01 RX ADMIN — ACETAMINOPHEN 650 MG: 325 TABLET ORAL at 03:45

## 2018-01-01 RX ADMIN — SODIUM CHLORIDE 50 ML/HR: 900 INJECTION, SOLUTION INTRAVENOUS at 06:00

## 2018-01-01 RX ADMIN — LISINOPRIL 10 MG: 10 TABLET ORAL at 09:24

## 2018-01-01 RX ADMIN — ACETAMINOPHEN 650 MG: 325 TABLET, FILM COATED ORAL at 13:12

## 2018-01-01 RX ADMIN — SODIUM CHLORIDE 50 ML/HR: 900 INJECTION, SOLUTION INTRAVENOUS at 20:51

## 2018-01-01 RX ADMIN — LORAZEPAM 0.5 MG: 0.5 TABLET ORAL at 21:04

## 2018-01-01 RX ADMIN — Medication 10 ML: at 22:10

## 2018-01-01 RX ADMIN — DIGOXIN 0.12 MG: 125 TABLET ORAL at 22:40

## 2018-01-01 RX ADMIN — IPRATROPIUM BROMIDE AND ALBUTEROL SULFATE 3 ML: .5; 3 SOLUTION RESPIRATORY (INHALATION) at 17:32

## 2018-01-01 RX ADMIN — Medication 10 ML: at 21:05

## 2018-01-01 RX ADMIN — IOPAMIDOL 50 ML: 755 INJECTION, SOLUTION INTRAVENOUS at 18:17

## 2018-01-01 RX ADMIN — Medication 10 ML: at 19:28

## 2018-01-01 RX ADMIN — SUCRALFATE 1 G: 1 SUSPENSION ORAL at 17:00

## 2018-01-01 RX ADMIN — DIGOXIN 0.12 MG: 125 TABLET ORAL at 10:12

## 2018-01-01 RX ADMIN — LEVOFLOXACIN 750 MG: 5 INJECTION, SOLUTION INTRAVENOUS at 13:52

## 2018-01-01 RX ADMIN — SODIUM CHLORIDE 40 MG: 9 INJECTION INTRAMUSCULAR; INTRAVENOUS; SUBCUTANEOUS at 17:34

## 2018-01-01 RX ADMIN — IPRATROPIUM BROMIDE AND ALBUTEROL SULFATE 3 ML: .5; 3 SOLUTION RESPIRATORY (INHALATION) at 08:30

## 2018-01-01 RX ADMIN — SUCRALFATE 1 G: 1 SUSPENSION ORAL at 07:02

## 2018-01-01 RX ADMIN — Medication 10 ML: at 21:12

## 2018-01-01 RX ADMIN — LISINOPRIL 10 MG: 10 TABLET ORAL at 10:05

## 2018-01-01 RX ADMIN — SUCRALFATE 1 G: 1 SUSPENSION ORAL at 09:45

## 2018-01-01 RX ADMIN — PANTOPRAZOLE SODIUM 40 MG: 40 TABLET, DELAYED RELEASE ORAL at 08:22

## 2018-01-01 RX ADMIN — HEPARIN SODIUM 5000 UNITS: 5000 INJECTION INTRAVENOUS; SUBCUTANEOUS at 22:39

## 2018-01-01 RX ADMIN — SUCRALFATE 1 G: 1 SUSPENSION ORAL at 21:29

## 2018-01-01 RX ADMIN — ACETAMINOPHEN: 500 TABLET ORAL at 00:02

## 2018-01-01 RX ADMIN — SUCRALFATE 1 G: 1 SUSPENSION ORAL at 12:27

## 2018-01-01 RX ADMIN — AZITHROMYCIN MONOHYDRATE 500 MG: 500 INJECTION, POWDER, LYOPHILIZED, FOR SOLUTION INTRAVENOUS at 22:26

## 2018-01-01 RX ADMIN — IPRATROPIUM BROMIDE AND ALBUTEROL SULFATE 3 ML: .5; 3 SOLUTION RESPIRATORY (INHALATION) at 17:17

## 2018-01-01 RX ADMIN — FUROSEMIDE 20 MG: 10 INJECTION, SOLUTION INTRAMUSCULAR; INTRAVENOUS at 17:33

## 2018-01-01 RX ADMIN — SODIUM CHLORIDE 40 MG: 9 INJECTION INTRAMUSCULAR; INTRAVENOUS; SUBCUTANEOUS at 17:00

## 2018-01-01 RX ADMIN — CEFTRIAXONE 1 G: 1 INJECTION, POWDER, FOR SOLUTION INTRAMUSCULAR; INTRAVENOUS at 21:12

## 2018-01-01 RX ADMIN — CEFTRIAXONE 1 G: 1 INJECTION, POWDER, FOR SOLUTION INTRAMUSCULAR; INTRAVENOUS at 22:39

## 2018-01-01 RX ADMIN — METOPROLOL TARTRATE 25 MG: 25 TABLET ORAL at 19:51

## 2018-01-01 RX ADMIN — Medication 10 ML: at 21:30

## 2018-01-01 RX ADMIN — DILTIAZEM HYDROCHLORIDE 180 MG: 180 CAPSULE, COATED, EXTENDED RELEASE ORAL at 10:12

## 2018-01-01 RX ADMIN — Medication 10 ML: at 05:38

## 2018-01-01 RX ADMIN — SUCRALFATE 1 G: 1 SUSPENSION ORAL at 12:34

## 2018-01-01 RX ADMIN — METOPROLOL TARTRATE 25 MG: 25 TABLET ORAL at 10:05

## 2018-01-01 RX ADMIN — Medication 10 ML: at 06:30

## 2018-01-01 RX ADMIN — SODIUM CHLORIDE 100 ML/HR: 900 INJECTION, SOLUTION INTRAVENOUS at 16:03

## 2018-01-01 RX ADMIN — SUCRALFATE 1 G: 1 SUSPENSION ORAL at 17:34

## 2018-01-01 RX ADMIN — HEPARIN SODIUM 5000 UNITS: 5000 INJECTION INTRAVENOUS; SUBCUTANEOUS at 09:28

## 2018-01-01 RX ADMIN — GABAPENTIN 300 MG: 300 CAPSULE ORAL at 06:33

## 2018-01-01 RX ADMIN — Medication 10 ML: at 21:17

## 2018-01-01 RX ADMIN — GABAPENTIN 600 MG: 300 CAPSULE ORAL at 18:18

## 2018-01-01 RX ADMIN — FUROSEMIDE 20 MG: 10 INJECTION, SOLUTION INTRAMUSCULAR; INTRAVENOUS at 09:26

## 2018-01-01 RX ADMIN — FUROSEMIDE 20 MG: 10 INJECTION, SOLUTION INTRAMUSCULAR; INTRAVENOUS at 09:24

## 2018-01-01 RX ADMIN — SUCRALFATE 1 G: 1 SUSPENSION ORAL at 17:33

## 2018-01-01 RX ADMIN — SUCRALFATE 1 G: 1 SUSPENSION ORAL at 06:50

## 2018-01-01 RX ADMIN — ALPRAZOLAM 0.25 MG: 0.5 TABLET ORAL at 12:15

## 2018-01-01 RX ADMIN — Medication 10 ML: at 17:25

## 2018-01-01 RX ADMIN — IPRATROPIUM BROMIDE AND ALBUTEROL SULFATE 3 ML: .5; 3 SOLUTION RESPIRATORY (INHALATION) at 12:21

## 2018-01-01 RX ADMIN — DILTIAZEM HYDROCHLORIDE 180 MG: 180 CAPSULE, COATED, EXTENDED RELEASE ORAL at 09:29

## 2018-01-01 RX ADMIN — Medication 10 ML: at 21:00

## 2018-01-01 RX ADMIN — DIGOXIN 0.12 MG: 125 TABLET ORAL at 21:12

## 2018-01-01 RX ADMIN — ACETAMINOPHEN 650 MG: 325 TABLET, FILM COATED ORAL at 21:51

## 2018-01-01 RX ADMIN — CEFTRIAXONE 1 G: 1 INJECTION, POWDER, FOR SOLUTION INTRAMUSCULAR; INTRAVENOUS at 22:07

## 2018-01-01 RX ADMIN — MONTELUKAST SODIUM 10 MG: 10 TABLET, FILM COATED ORAL at 09:26

## 2018-01-01 RX ADMIN — FUROSEMIDE 20 MG: 20 TABLET ORAL at 18:18

## 2018-01-01 RX ADMIN — GABAPENTIN 600 MG: 300 CAPSULE ORAL at 21:30

## 2018-01-01 RX ADMIN — FUROSEMIDE 20 MG: 10 INJECTION, SOLUTION INTRAMUSCULAR; INTRAVENOUS at 09:45

## 2018-01-01 RX ADMIN — Medication 10 ML: at 22:00

## 2018-01-01 RX ADMIN — ACETAMINOPHEN 650 MG: 325 TABLET, FILM COATED ORAL at 15:09

## 2018-01-01 RX ADMIN — Medication 10 ML: at 06:11

## 2018-01-01 RX ADMIN — Medication 5 ML: at 14:00

## 2018-01-01 RX ADMIN — SODIUM CHLORIDE 100 ML: 900 INJECTION, SOLUTION INTRAVENOUS at 18:18

## 2018-01-01 RX ADMIN — Medication 10 ML: at 15:14

## 2018-01-01 RX ADMIN — GABAPENTIN 600 MG: 300 CAPSULE ORAL at 18:00

## 2018-01-01 RX ADMIN — SODIUM CHLORIDE 40 MG: 9 INJECTION INTRAMUSCULAR; INTRAVENOUS; SUBCUTANEOUS at 19:51

## 2018-01-01 RX ADMIN — SODIUM CHLORIDE 50 ML/HR: 900 INJECTION, SOLUTION INTRAVENOUS at 10:10

## 2018-01-01 RX ADMIN — Medication 10 ML: at 06:50

## 2018-01-01 RX ADMIN — MONTELUKAST SODIUM 10 MG: 10 TABLET, FILM COATED ORAL at 10:04

## 2018-01-01 RX ADMIN — SUCRALFATE 1 G: 1 SUSPENSION ORAL at 15:09

## 2018-01-01 RX ADMIN — LISINOPRIL 10 MG: 10 TABLET ORAL at 09:46

## 2018-01-01 RX ADMIN — SODIUM CHLORIDE 100 ML/HR: 900 INJECTION, SOLUTION INTRAVENOUS at 14:00

## 2018-01-01 RX ADMIN — LISINOPRIL 10 MG: 10 TABLET ORAL at 09:29

## 2018-01-01 RX ADMIN — GABAPENTIN 600 MG: 300 CAPSULE ORAL at 21:04

## 2018-01-01 RX ADMIN — ACETAMINOPHEN: 500 TABLET ORAL at 22:26

## 2018-01-01 RX ADMIN — Medication 10 ML: at 18:17

## 2018-01-01 RX ADMIN — IPRATROPIUM BROMIDE AND ALBUTEROL SULFATE 3 ML: .5; 3 SOLUTION RESPIRATORY (INHALATION) at 16:09

## 2018-01-01 RX ADMIN — BACITRACIN 1 PACKET: 500 OINTMENT TOPICAL at 18:55

## 2018-01-01 RX ADMIN — LISINOPRIL 10 MG: 10 TABLET ORAL at 09:26

## 2018-01-01 RX ADMIN — Medication 10 ML: at 06:31

## 2018-01-01 RX ADMIN — METOPROLOL TARTRATE 25 MG: 25 TABLET ORAL at 18:00

## 2018-01-01 RX ADMIN — SODIUM CHLORIDE 40 MG: 9 INJECTION INTRAMUSCULAR; INTRAVENOUS; SUBCUTANEOUS at 20:50

## 2018-01-01 RX ADMIN — MONTELUKAST SODIUM 10 MG: 10 TABLET, FILM COATED ORAL at 09:46

## 2018-01-01 RX ADMIN — MONTELUKAST SODIUM 10 MG: 10 TABLET, FILM COATED ORAL at 11:59

## 2018-01-01 RX ADMIN — SUCRALFATE 1 G: 1 SUSPENSION ORAL at 06:30

## 2018-01-01 RX ADMIN — HEPARIN SODIUM 5000 UNITS: 5000 INJECTION INTRAVENOUS; SUBCUTANEOUS at 12:15

## 2018-01-01 RX ADMIN — MONTELUKAST SODIUM 10 MG: 10 TABLET, FILM COATED ORAL at 09:59

## 2018-01-01 RX ADMIN — MONTELUKAST SODIUM 10 MG: 10 TABLET, FILM COATED ORAL at 10:13

## 2018-01-01 RX ADMIN — FUROSEMIDE 20 MG: 10 INJECTION, SOLUTION INTRAMUSCULAR; INTRAVENOUS at 22:50

## 2018-01-01 RX ADMIN — MONTELUKAST SODIUM 10 MG: 10 TABLET, FILM COATED ORAL at 18:18

## 2018-01-01 RX ADMIN — GABAPENTIN 300 MG: 300 CAPSULE ORAL at 06:10

## 2018-01-01 RX ADMIN — SODIUM CHLORIDE 40 MG: 9 INJECTION INTRAMUSCULAR; INTRAVENOUS; SUBCUTANEOUS at 10:12

## 2018-01-01 RX ADMIN — FUROSEMIDE 20 MG: 10 INJECTION, SOLUTION INTRAMUSCULAR; INTRAVENOUS at 20:50

## 2018-01-01 RX ADMIN — LISINOPRIL 10 MG: 10 TABLET ORAL at 10:12

## 2018-01-01 RX ADMIN — SODIUM CHLORIDE 40 MG: 9 INJECTION INTRAMUSCULAR; INTRAVENOUS; SUBCUTANEOUS at 09:26

## 2018-01-01 RX ADMIN — Medication 10 ML: at 06:00

## 2018-01-01 RX ADMIN — GABAPENTIN 300 MG: 300 CAPSULE ORAL at 06:30

## 2018-01-01 RX ADMIN — ACETAMINOPHEN 650 MG: 325 TABLET ORAL at 23:29

## 2018-01-01 RX ADMIN — METOPROLOL TARTRATE 25 MG: 25 TABLET ORAL at 09:46

## 2018-01-01 RX ADMIN — Medication 10 ML: at 20:53

## 2018-01-01 RX ADMIN — FUROSEMIDE 20 MG: 10 INJECTION, SOLUTION INTRAMUSCULAR; INTRAVENOUS at 08:22

## 2018-01-01 RX ADMIN — SODIUM CHLORIDE 40 MG: 9 INJECTION INTRAMUSCULAR; INTRAVENOUS; SUBCUTANEOUS at 09:47

## 2018-01-01 RX ADMIN — Medication 10 ML: at 14:00

## 2018-01-01 RX ADMIN — PANTOPRAZOLE SODIUM 40 MG: 40 TABLET, DELAYED RELEASE ORAL at 09:29

## 2018-01-01 RX ADMIN — DILTIAZEM HYDROCHLORIDE 180 MG: 180 CAPSULE, COATED, EXTENDED RELEASE ORAL at 08:22

## 2018-01-01 RX ADMIN — GABAPENTIN 300 MG: 300 CAPSULE ORAL at 07:02

## 2018-01-01 RX ADMIN — DILTIAZEM HYDROCHLORIDE 180 MG: 180 CAPSULE, COATED, EXTENDED RELEASE ORAL at 09:46

## 2018-01-01 RX ADMIN — Medication 10 ML: at 22:39

## 2018-01-01 RX ADMIN — ACETAMINOPHEN 650 MG: 325 TABLET, FILM COATED ORAL at 02:21

## 2018-01-01 RX ADMIN — MONTELUKAST SODIUM 10 MG: 10 TABLET, FILM COATED ORAL at 18:00

## 2018-01-01 RX ADMIN — AZITHROMYCIN MONOHYDRATE 500 MG: 500 INJECTION, POWDER, LYOPHILIZED, FOR SOLUTION INTRAVENOUS at 23:29

## 2018-01-01 RX ADMIN — SODIUM CHLORIDE 40 MG: 9 INJECTION INTRAMUSCULAR; INTRAVENOUS; SUBCUTANEOUS at 09:21

## 2018-01-01 RX ADMIN — SUCRALFATE 1 G: 1 SUSPENSION ORAL at 21:12

## 2018-01-01 RX ADMIN — FUROSEMIDE 20 MG: 10 INJECTION, SOLUTION INTRAMUSCULAR; INTRAVENOUS at 09:28

## 2018-01-01 RX ADMIN — GABAPENTIN 300 MG: 300 CAPSULE ORAL at 06:50

## 2018-01-01 RX ADMIN — LISINOPRIL 10 MG: 10 TABLET ORAL at 09:59

## 2018-01-01 RX ADMIN — IPRATROPIUM BROMIDE AND ALBUTEROL SULFATE 3 ML: .5; 3 SOLUTION RESPIRATORY (INHALATION) at 01:47

## 2018-01-01 RX ADMIN — LORAZEPAM 0.5 MG: 0.5 TABLET ORAL at 21:15

## 2018-01-01 RX ADMIN — HEPARIN SODIUM 5000 UNITS: 5000 INJECTION INTRAVENOUS; SUBCUTANEOUS at 23:37

## 2018-01-01 RX ADMIN — AZITHROMYCIN MONOHYDRATE 500 MG: 500 INJECTION, POWDER, LYOPHILIZED, FOR SOLUTION INTRAVENOUS at 23:37

## 2018-01-01 RX ADMIN — ACETAMINOPHEN 650 MG: 325 TABLET, FILM COATED ORAL at 21:06

## 2018-01-01 RX ADMIN — LORAZEPAM 0.5 MG: 0.5 TABLET ORAL at 21:00

## 2018-01-01 RX ADMIN — SODIUM CHLORIDE 40 MG: 9 INJECTION INTRAMUSCULAR; INTRAVENOUS; SUBCUTANEOUS at 10:01

## 2018-01-01 RX ADMIN — LORAZEPAM 0.5 MG: 0.5 TABLET ORAL at 22:41

## 2018-01-01 RX ADMIN — SUCRALFATE 1 G: 1 SUSPENSION ORAL at 15:48

## 2018-01-01 RX ADMIN — GABAPENTIN 300 MG: 300 CAPSULE ORAL at 07:06

## 2018-01-01 RX ADMIN — DILTIAZEM HYDROCHLORIDE 180 MG: 180 CAPSULE, COATED, EXTENDED RELEASE ORAL at 09:59

## 2018-01-01 RX ADMIN — GABAPENTIN 300 MG: 300 CAPSULE ORAL at 06:02

## 2018-01-01 RX ADMIN — FUROSEMIDE 20 MG: 10 INJECTION, SOLUTION INTRAMUSCULAR; INTRAVENOUS at 10:01

## 2018-01-01 RX ADMIN — Medication 10 ML: at 07:02

## 2018-01-01 RX ADMIN — IPRATROPIUM BROMIDE AND ALBUTEROL SULFATE 3 ML: .5; 3 SOLUTION RESPIRATORY (INHALATION) at 09:12

## 2018-01-01 RX ADMIN — LISINOPRIL 10 MG: 10 TABLET ORAL at 08:22

## 2018-01-05 PROBLEM — R06.02 SOB (SHORTNESS OF BREATH): Status: ACTIVE | Noted: 2018-01-01

## 2018-01-05 NOTE — ED PROVIDER NOTES
HPI Comments: 80 y.o. female with past medical history significant for HTN, asthma, DM and left sided breast CA who presents from [facility] with chief complaint of Melena. Per pt, she has had multiple episodes of dark appearing stool since yesterday (1/4/2018). The pt reports that she had 7 episodes of dark stool yesterday evening, with two more episodes today thus shahrzad. She notes that she has not experienced any abdominal pain, nausea or vomiting associated with her melena. In addition to her dark stool, the pt reports that she has had apparent low O2 levels with a reading of 82% today on home monitor. An on call nurse visited the pt this morning and instructed that the pt be seen in the ED for evaluation of her symptoms. Per pt, she has no hx of GI bleed or colonoscopy in the past. The pt's relative makes it known that the pt is currently taking 81 mg ASA daily and has a hx of a-fib which is controlled. The pt adds that she is also taking digoxin every other day. She denies fever, chills, N/V, CP, SOB, abd pain, back pain, dizziness, weakness and urinary symptoms. There are no other acute medical concerns at this time. Social hx: Non-smoker, No ETOH use    PCP: Joli Goodell, MD    Note written by Chadwick Freeman, as dictated by Bailey Urbano MD 2:51 PM              The history is provided by the patient and a relative. No  was used. Past Medical History:   Diagnosis Date    Asthma     Cancer ([facility] [address] 2011    LEFT breast     Diabetes ([facility] [address]     Hypertension        Past Surgical History:   Procedure Laterality Date    HX BREAST LUMPECTOMY  2011    LEFT breast     HX LAP CHOLECYSTECTOMY           Family History:   Problem Relation Age of Onset    Cancer Mother      ?  TYPE CA    Diabetes Father     Heart Disease Father     Hypertension Father     Lung Disease Paternal Grandfather     Asthma Neg Hx     Malignant Hyperthermia Neg Hx     Pseudocholinesterase Deficiency Neg Hx     Delayed Awakening Neg Hx     Post-op Nausea/Vomiting Neg Hx        Social History     Social History    Marital status:      Spouse name: N/A    Number of children: N/A    Years of education: N/A     Occupational History    Not on file. Social History Main Topics    Smoking status: Never Smoker    Smokeless tobacco: Never Used    Alcohol use No    Drug use: Not on file    Sexual activity: Not on file     Other Topics Concern    Not on file     Social History Narrative         ALLERGIES: Adhesive    Review of Systems   Constitutional: Negative. Negative for chills and fever. HENT: Negative. Eyes: Negative. Respiratory: Negative. Negative for shortness of breath. Cardiovascular: Negative. Negative for chest pain. Gastrointestinal: Positive for blood in stool (onset of black stool yesterday which has continued today. The pt has had about 9 episodes since onset ). Negative for abdominal pain, nausea and vomiting. Endocrine: Negative. Genitourinary: Negative. Musculoskeletal: Negative. Skin: Negative. Allergic/Immunologic: Negative. Neurological: Negative. Negative for dizziness, weakness, light-headedness and headaches. Hematological: Negative. Psychiatric/Behavioral: Negative. All other systems reviewed and are negative. Vitals:    01/05/18 1319 01/05/18 1444 01/05/18 1446   BP: 134/76 138/45    Pulse: 96     Resp: 18     Temp: 97.6 °F (36.4 °C)     SpO2: 96%  94%   Weight: 44.9 kg (99 lb)              Physical Exam     Nursing note and vitals reviewed. Constitutional: ELDERLY No distress. HENT:   Head: Normocephalic and atraumatic. Sclera anicteric  Nose: No rhinorrhea  Mouth/Throat: Oropharynx is clear and moist. Pharynx normal  Eyes: Conjunctivae are normal. Pupils are equal, round, and reactive to light. Right eye exhibits no discharge. Left eye exhibits no discharge. No scleral icterus.    Neck: Painless normal range of motion. Supple  Cardiovascular: Normal rate, regular rhythm, normal heart sounds and intact distal pulses. Exam reveals no gallop and no friction rub. No murmur heard. Pulmonary/Chest: Effort normal and breath sounds normal. No respiratory distress. no wheezes. no rales. Abdominal: Soft. Bowel sounds are normal. Exhibits no distension and no mass. No tenderness. No guarding. Musculoskeletal: Normal range of motion. no tenderness. No edema  Lymphadenopathy:   No cervical adenopathy. Neurological:  Alert and oriented to person, place, and time. Coordination normal. CN 2-12 intact. Moving all extremities. Skin: Skin is warm and dry. No rash noted. No pallor. RECTAL WITH BLACK STOOL WITHOUT ANY PALPABLE MASS. MDM  ED Course   BLACK STOOL. REPORTED LOW SATS AT HOME. ABD SOFT AND NONTENDER. DDX:  GIB, ANEMIA, PNEUMONIA, CHF AND OTHERS. CHECK CXR, LABS, TYPE AND SCREEN. NEVER HAD A COLONOSCOPY AND DOES NOT WANT ONE.  NOT ANTICOAGULATED EXCEPT BABY ASA. H/O AFIB AND RATE CONTROLLED. Procedures    ED EKG interpretation:  Rhythm: atrial fib; Rate (approx.): 76 bpm; Axis: normal; ST/T wave: depressed at lateral leads, 2nd and AVF. Note written by Chadwick Meier, as dictated by Stephanie Quevedo MD 1:40 PM    PROGRESS NOTE:  2:52 PM  The pt's relative notes that the pt is a DNR. PROGRESS NOTE:  3:28 PM  The pt's Guaiac results are negative,however I have strong suspicion for GI bleed due to a 3 point drop in hemoglobin over the past month. Stool also appears black. CONSULT NOTE:  3:36 PM Stephanie Quevedo MD spoke with Dr. Esther Saenz, Consult for Hospitalist.  Discussed available diagnostic tests and clinical findings. He is in agreement with care plans as outlined. Dr. Esther Saenz will see and admit the pt.          Recent Results (from the past 24 hour(s))   EKG, 12 LEAD, INITIAL    Collection Time: 01/05/18  1:40 PM   Result Value Ref Range Ventricular Rate 76 BPM    Atrial Rate 82 BPM    QRS Duration 78 ms    Q-T Interval 336 ms    QTC Calculation (Bezet) 378 ms    Calculated R Axis 80 degrees    Calculated T Axis -133 degrees    Diagnosis       Atrial fibrillation  Possible Anteroseptal infarct (cited on or before 09-MAR-2016)  ST depression in Lateral leads Consider ischemia  ST elevation in V1 and AVR; Q waves in both V1 and AVR  When compared with ECG of 01-DEC-2017 11:10,  ST now depressed in Lateral leads  ST elevation now present in V1 and AVR  The heart rate has decreased  Confirmed by Ayde Castillo M.D., [place] ([id number]) on 1/5/2018 2:25:38 PM     CBC WITH AUTOMATED DIFF    Collection Time: 01/05/18  1:47 PM   Result Value Ref Range    WBC 12.7 (H) 3.6 - 11.0 K/uL    RBC 3.33 (L) 3.80 - 5.20 M/uL    HGB 8.5 (L) 11.5 - 16.0 g/dL    HCT 28.7 (L) 35.0 - 47.0 %    MCV 86.2 80.0 - 99.0 FL    MCH 25.5 (L) 26.0 - 34.0 PG    MCHC 29.6 (L) 30.0 - 36.5 g/dL    RDW 15.5 (H) 11.5 - 14.5 %    PLATELET 118 363 - 087 K/uL    NEUTROPHILS 74 32 - 75 %    LYMPHOCYTES 18 12 - 49 %    MONOCYTES 7 5 - 13 %    EOSINOPHILS 0 0 - 7 %    BASOPHILS 1 0 - 1 %    ABS. NEUTROPHILS 9.5 (H) 1.8 - 8.0 K/UL    ABS. LYMPHOCYTES 2.2 0.8 - 3.5 K/UL    ABS. MONOCYTES 0.9 0.0 - 1.0 K/UL    ABS. EOSINOPHILS 0.0 0.0 - 0.4 K/UL    ABS. BASOPHILS 0.1 0.0 - 0.1 K/UL   METABOLIC PANEL, COMPREHENSIVE    Collection Time: 01/05/18  1:47 PM   Result Value Ref Range    Sodium 141 136 - 145 mmol/L    Potassium 3.8 3.5 - 5.1 mmol/L    Chloride 107 97 - 108 mmol/L    CO2 25 21 - 32 mmol/L    Anion gap 9 5 - 15 mmol/L    Glucose 107 (H) 65 - 100 mg/dL    BUN 28 (H) 6 - 20 MG/DL    Creatinine 1.13 (H) 0.55 - 1.02 MG/DL    BUN/Creatinine ratio 25 (H) 12 - 20      GFR est AA 54 (L) >60 ml/min/1.73m2    GFR est non-AA 45 (L) >60 ml/min/1.73m2    Calcium 9.7 8.5 - 10.1 MG/DL    Bilirubin, total 0.5 0.2 - 1.0 MG/DL    ALT (SGPT) 28 12 - 78 U/L    AST (SGOT) 23 15 - 37 U/L    Alk.  phosphatase 99 45 - 117 U/L Protein, total 7.6 6.4 - 8.2 g/dL    Albumin 3.8 3.5 - 5.0 g/dL    Globulin 3.8 2.0 - 4.0 g/dL    A-G Ratio 1.0 (L) 1.1 - 2.2     CK W/ REFLX CKMB    Collection Time: 01/05/18  1:47 PM   Result Value Ref Range    CK 92 26 - 192 U/L   TROPONIN I    Collection Time: 01/05/18  1:47 PM   Result Value Ref Range    Troponin-I, Qt. 0.05 (H) <0.05 ng/mL   PROTHROMBIN TIME + INR    Collection Time: 01/05/18  1:47 PM   Result Value Ref Range    INR 1.2 (H) 0.9 - 1.1      Prothrombin time 12.0 (H) 9.0 - 11.1 sec   TYPE & SCREEN    Collection Time: 01/05/18  1:47 PM   Result Value Ref Range    Crossmatch Expiration 01/08/2018     ABO/Rh(D) A POSITIVE     Antibody screen NEG    OCCULT BLOOD, STOOL    Collection Time: 01/05/18  2:53 PM   Result Value Ref Range    Occult blood, stool NEGATIVE  NEG         Xr Chest Pa Lat    Result Date: 1/5/2018  Exam:  2 view chest Indication: Shortness of breath. COMPARISON: 3/9/2016 PA and lateral views demonstrate no change in the mild cardiomegaly. There are bilateral pleural effusions right greater than left. These are similar to the previous examination. The left pleural effusion has slightly decreased. There is fluid in the plane of the right major fissure as well as some fluid loculated posteriorly. The lungs demonstrate mild atelectasis at the lung bases no pneumonia or pulmonary edema. There is a moderate hiatal hernia     IMPRESSION: 1. Persistent cardiomegaly and small bilateral pleural effusions right greater than left. Matilde Milner

## 2018-01-05 NOTE — PROGRESS NOTES
The following herbal, alternative, and/or nutritional/dietary supplement product(s) has been discontinued  per [facility] approved policy:      melatonin 5 mg cap capsule qhs       Please reorder upon discharge if appropriate.

## 2018-01-05 NOTE — ED TRIAGE NOTES
Black stool onset yesterday. Denies abdominal pain, N/V/D, dysuria, CP, or SOB. \"My O2 sats were low this morning so I put it on today\". O2 sats 96% on RA in triage.

## 2018-01-05 NOTE — PROGRESS NOTES
Admission Medication Reconciliation:    Information obtained from: patient, patient's medication list from home, rx query    Significant PMH/Disease States:   Past Medical History:   Diagnosis Date    Asthma     Cancer ([facility] [address] 2011    LEFT breast     Diabetes ([facility] [address]     Hypertension        Chief Complaint for this Admission:  black stool    Allergies:  Adhesive    Prior to Admission Medications:   Prior to Admission Medications   Prescriptions Last Dose Informant Patient Reported? Taking? LORazepam (ATIVAN) 0.5 mg tablet   Yes Yes   Sig: Take 0.25 mg by mouth every eight (8) hours as needed for Anxiety. aspirin delayed-release 81 mg tablet 1/4/2018 at pm  Yes Yes   Sig: Take 81 mg by mouth every evening. digoxin (LANOXIN) 0.125 mg tablet 1/4/2018 at am  No Yes   Sig: Take 1 Tab by mouth every other day. dilTIAZem CD (CARTIA XT) 180 mg ER capsule 1/5/2018 at am  Yes Yes   Sig: Take 180 mg by mouth daily. furosemide (LASIX) 20 mg tablet 1/4/2018 at Unknown time  Yes Yes   Sig: Take 10 mg by mouth daily as needed. Indications: Edema   gabapentin (NEURONTIN) 300 mg capsule 1/5/2018 at am  Yes Yes   Sig: Take 300 mg by mouth every morning.   gabapentin (NEURONTIN) 300 mg capsule 1/4/2018 at pm  Yes Yes   Sig: Take 600 mg by mouth every evening. ibuprofen (MOTRIN) 200 mg tablet   Yes Yes   Sig: Take 200 mg by mouth every eight (8) hours as needed for Pain. lisinopril (PRINIVIL, ZESTRIL) 10 mg tablet 1/5/2018 at am  Yes Yes   Sig: Take 10 mg by mouth daily. melatonin 5 mg cap capsule 1/4/2018 at pm  Yes Yes   Sig: Take 5 mg by mouth nightly. montelukast (SINGULAIR) 10 mg tablet 1/4/2018 at pm  Yes Yes   Sig: Take 10 mg by mouth every evening.       Facility-Administered Medications: None     Comments/Recommendations:   Removed: zolpidem  Added: gabapentin 600 mg nightly  Changed:       -Aspirin frequency to every evening       -Furosemide dose to 1/2 tab daily as needed for edema       -Ibuprofen prn dose to 200 mg       -Lorazepam prn dose to 0.25 mg       -Changed montelukast frequency to every evening    The patient reported that she took diltiazem, gabapentin, and lisinopril this morning. She takes aspirin, melatonin, and Singulair at night. She took digoxin yesterday. Thank you for allowing me to participate in the care of this patient. Please contact the pharmacy () or the medication reconciliation pharmacist () with any questions.     Esperanza Argueta, PharmD

## 2018-01-05 NOTE — IP AVS SNAPSHOT
2700 [facility] [address] [place] [address] 
[phone] Patient: Dell Lugo MRN: [id number] IDP:[id number] About your hospitalization You were admitted on:  January 5, 2018 You last received care in the:  [facility] [place] 2N MED SURG You were discharged on:  January 11, 2018 Why you were hospitalized Your primary diagnosis was:  Sob (Shortness Of Breath) Follow-up Information Follow up With Details Comments Contact Info Tammy Tian MD On 1/17/2018 not taken off NSAID's and ASA 2n2 GI bleed  [facility] follow-up PCP appointment on Wednesday, 1/17/18 @ 1:15 p.m. with Dr. Karie Ross [address] [address] 
[phone] 3503 [facility] nurse and physical/occupational therapists. Call agency office if you have not been contacted by a liaison within 24 hours of hospital discharge. [address] 
1st Floor [facility] [address] 
[phone] Discharge Orders None A check pina indicates which time of day the medication should be taken. My Medications CONTINUE taking these medications Instructions Each Dose to Equal  
 Morning Noon Evening Bedtime CARTIA  mg ER capsule Generic drug:  dilTIAZem CD Your last dose was: Your next dose is: Take 180 mg by mouth daily. 180 mg  
    
   
   
   
  
 digoxin 0.125 mg tablet Commonly known as:  LANOXIN Your last dose was: Your next dose is: Take 1 Tab by mouth every other day. 0.125 mg  
    
   
   
   
  
 furosemide 20 mg tablet Commonly known as:  LASIX Your last dose was: Your next dose is: Take 10 mg by mouth daily as needed. Indications: Edema 10 mg  
    
   
   
   
  
 * gabapentin 300 mg capsule Commonly known as:  NEURONTIN Your last dose was: Your next dose is: Take 300 mg by mouth every morning. 300 mg  
    
   
   
   
  
 * gabapentin 300 mg capsule Commonly known as:  NEURONTIN Your last dose was: Your next dose is: Take 600 mg by mouth every evening. 600 mg  
    
   
   
   
  
 lisinopril 10 mg tablet Commonly known as:  Rheta Car Your last dose was: Your next dose is: Take 10 mg by mouth daily. 10 mg LORazepam 0.5 mg tablet Commonly known as:  ATIVAN Your last dose was: Your next dose is: Take 0.25 mg by mouth every eight (8) hours as needed for Anxiety. 0.25 mg  
    
   
   
   
  
 melatonin 5 mg Cap capsule Your last dose was: Your next dose is: Take 5 mg by mouth nightly. 5 mg SINGULAIR 10 mg tablet Generic drug:  montelukast  
   
Your last dose was: Your next dose is: Take 10 mg by mouth every evening. 10 mg  
    
   
   
   
  
 * Notice: This list has 2 medication(s) that are the same as other medications prescribed for you. Read the directions carefully, and ask your doctor or other care provider to review them with you. STOP taking these medications   
 aspirin delayed-release 81 mg tablet  
   
  
 ibuprofen 200 mg tablet Commonly known as:  MOTRIN Discharge Instructions Discharge Instructions PATIENT ID: Shilpa Anderson MRN: [id number] YOB: 1922 DATE OF ADMISSION: 1/5/2018  2:19 PM   
DATE OF DISCHARGE: 1/11/2018 PRIMARY CARE PROVIDER: Karolina Miller MD  
 
ATTENDING PHYSICIAN: Erin Mcclure MD 
DISCHARGING PROVIDER: Erin Mcclure MD   
To contact this individual call [phone] and ask the  to page. If unavailable ask to be transferred the Adult Hospitalist Department. DISCHARGE DIAGNOSES see dc noted, had Upper GI bleed CONSULTATIONS: IP CONSULT TO HOSPITALIST 
IP CONSULT TO GASTROENTEROLOGY PROCEDURES/SURGERIES: Procedure(s): ESOPHAGOGASTRODUODENOSCOPY (EGD) PENDING TEST RESULTS:  
At the time of discharge the following test results are still pending: na 
 
FOLLOW UP APPOINTMENTS:  
Follow-up Information Follow up With Details Comments Contact Nick Guadalupe MD In 1 week not taken off NSAID's and ASA 2n2 GI bleed [address] [address] 
[phone] ADDITIONAL CARE RECOMMENDATIONS: na 
 
DIET: Resume previous diet ACTIVITY: Activity as tolerated WOUND CARE: na 
 
EQUIPMENT needed: na 
 
 
  
  SNF/Inpatient Rehab/LTAC  
x Independent/assisted living Hospice Other:  
 
  
Signed:  
Laury Torres MD 
1/11/2018 
1:38 PM 
 
  
  
  
Bi[employer] Announcement We are excited to announce that we are making your provider's discharge notes available to you in [employer]. You will see these notes when they are completed and signed by the physician that discharged you from your recent hospital stay.   If you have any questions or concerns about any information you see in [employer], please call the [facility] Department where you were seen or reach out to your Primary Care Provider for more information about your plan of care. Introducing [facility]! Raul Gaines introduces [employer] patient portal. Now you can access parts of your medical record, email your doctor's office, and request medication refills online. 1. In your internet browser, go to [email or web address] 2. Click on the First Time User? Click Here link in the Sign In box. You will see the New Member Sign Up page. 3. Enter your [employer]ipe Access Code exactly as it appears below. You will not need to use this code after youve completed the sign-up process. If you do not sign up before the expiration date, you must request a new code. · [employer] Access Code: [email or web address] Expires: 3/1/2018 11:22 AM 
 
4. Enter the last four digits of your Social Security Number (xxxx) and Date of Birth (mm/dd/yyyy) as indicated and click Submit. You will be taken to the next sign-up page. 5. Create a [employer]ipe ID. This will be your [employer] login ID and cannot be changed, so think of one that is secure and easy to remember. 6. Create a [employer] password. You can change your password at any time. 7. Enter your Password Reset Question and Answer. This can be used at a later time if you forget your password. 8. Enter your e-mail address. You will receive e-mail notification when new information is available in [address] 9. Click Sign Up. You can now view and download portions of your medical record. 10. Click the Download Summary menu link to download a portable copy of your medical information. If you have questions, please visit the Frequently Asked Questions section of the [employer] website. Remember, [employer] is NOT to be used for urgent needs. For medical emergencies, dial 911. Now available from your iPhone and Android! Providers Seen During Your Hospitalization Provider Specialty Primary office phone Trisha Conteh MD Emergency Medicine [phone] Ariana Miner MD Hospitalist [phone] Johnathan Lynn MD Internal Medicine [phone] Laura Webb MD Internal Medicine [phone] Shyanne Florez MD Internal Medicine [phone] Your Primary Care Physician (PCP) Primary Care Physician Office Phone Office Fax Nic Cintron [phone] You are allergic to the following Allergen Reactions Beta-Blockers (Beta-Adrenergic Blocking Agts) Other (comments) Chidi to 40 on only 25 mg metroprolol bid Adhesive Rash Recent Documentation Height Weight BMI OB Status Smoking Status 1.6 m 46.4 kg 18.1 kg/m2 Postmenopausal Never Smoker Emergency Contacts Name Discharge Info Relation Home Work Mobile [address] CAREGIVER [3] Child [2] [phone] Patient Belongings The following personal items are in your possession at time of discharge: 
  Dental Appliances: None  Visual Aid: Glasses   Hearing Aids/Status: Functioning Please provide this summary of care documentation to your next provider. Signatures-by signing, you are acknowledging that this After Visit Summary has been reviewed with you and you have received a copy. Patient Signature:  ____________________________________________________________ Date:  ____________________________________________________________  
  
Barbara Felix Provider Signature:  ____________________________________________________________ Date:  ____________________________________________________________

## 2018-01-05 NOTE — IP AVS SNAPSHOT
1111 [facility] [address] [place] [address] 
[phone] Patient: Santiago Braga MRN: [id number] IBT:7/50/1736 A check pina indicates which time of day the medication should be taken. My Medications CONTINUE taking these medications Instructions Each Dose to Equal  
 Morning Noon Evening Bedtime CARTIA  mg ER capsule Generic drug:  dilTIAZem CD Your last dose was: Your next dose is: Take 180 mg by mouth daily. 180 mg  
    
   
   
   
  
 digoxin 0.125 mg tablet Commonly known as:  LANOXIN Your last dose was: Your next dose is: Take 1 Tab by mouth every other day. 0.125 mg  
    
   
   
   
  
 furosemide 20 mg tablet Commonly known as:  LASIX Your last dose was: Your next dose is: Take 10 mg by mouth daily as needed. Indications: Edema 10 mg  
    
   
   
   
  
 * gabapentin 300 mg capsule Commonly known as:  NEURONTIN Your last dose was: Your next dose is: Take 300 mg by mouth every morning. 300 mg  
    
   
   
   
  
 * gabapentin 300 mg capsule Commonly known as:  NEURONTIN Your last dose was: Your next dose is: Take 600 mg by mouth every evening. 600 mg  
    
   
   
   
  
 lisinopril 10 mg tablet Commonly known as:  Tonia Pagan Your last dose was: Your next dose is: Take 10 mg by mouth daily. 10 mg LORazepam 0.5 mg tablet Commonly known as:  ATIVAN Your last dose was: Your next dose is: Take 0.25 mg by mouth every eight (8) hours as needed for Anxiety. 0.25 mg  
    
   
   
   
  
 melatonin 5 mg Cap capsule Your last dose was: Your next dose is: Take 5 mg by mouth nightly. 5 mg SINGULAIR 10 mg tablet Generic drug:  montelukast  
   
Your last dose was: Your next dose is: Take 10 mg by mouth every evening. 10 mg  
    
   
   
   
  
 * Notice: This list has 2 medication(s) that are the same as other medications prescribed for you. Read the directions carefully, and ask your doctor or other care provider to review them with you. STOP taking these medications   
 aspirin delayed-release 81 mg tablet  
   
  
 ibuprofen 200 mg tablet Commonly known as:  MOTRIN

## 2018-01-05 NOTE — H&P
[address]  ACUTE CARE HISTORY AND PHYSICAL    Modesto Chavira  MR#: [id number]  : 1922  ACCOUNT #: [de-identified]   DATE OF SERVICE: 2018    CHIEF COMPLAINT:  Black stools. HISTORY OF PRESENT ILLNESS:  The patient is a 17-year-old female with past medical history of hypertension, asthma, diabetes and left-sided breast cancer, who presented to the hospital with the above mentioned symptoms. The patient has history of chronic pleural effusions and chronic respiratory failure, was seen at [facility] in 2016, was found to be hypoxic and was put on oxygen on a regular basis. The patient also has a history of chronic atrial fibrillation, but is not on anticoagulation due to bleeding risk and history of GI bleed. Patient also has history of chronic diastolic heart failure. The patient presents today because reports that since last evening she has been having dark tarry black stools. Patient reports initially they were \"lighter black\" in color, but later in the night and this morning it was very dark black. Patient also reports that she is supposed to wear oxygen, but has not been wearing it for about six months to eight months. The daughter who is present at the bedside reports that the patient's oxygen saturation has been between 96-98% on room air. Today, the nurse came and checked her oxygen saturation and she was found to have O2 saturation of around 82%. Patient was sent to the ED for further management and evaluation. The patient denies any other complaints or problems. The patient denies any chest pain associated with her symptoms. Denies any cough, runny nose, sore throat, fevers. Denies any exertional dyspnea more than usual.  Denies any swelling in her legs. Denies any hematemesis, hemoptysis or hematuria.   Patient has had a drop of about 3 g of hemoglobin in the last one month on a CBC that was done in the ER and was requested to be admitted under the hospitalist service. The patient denies any headache, blurry vision, sore throat, trouble swallowing, trouble with speech, any lightheadedness or dizziness, any abdominal pain, constipation, diarrhea, any fever, chills, nausea, vomiting, urinary symptoms, focal or generalized neurological weakness, falls,  injuries or any other concerns or problems. PAST MEDICAL HISTORY:  See above. HOME MEDICATIONS:    Currently the patient is on:  1. Neurontin 600 mg every evening. 2. Furosemide 10 mg by mouth daily as needed. 3. Cardizem- mg every day. 4. Lisinopril 10 mg  daily. 5. Melatonin 5 mg q.  nightly. 6. Lorazepam 0.25 mg every eight hours as needed. 7. Motrin p.r.n.  8. Digoxin 0.125 mg every other day. 9. Aspirin 81 mg every day. 10.  Montelukast 10 mg in the evening. SOCIAL HISTORY:  Denies tobacco use, alcohol use, IV drug abuse. Lives at home. ALLERGIES:  ADHESIVE TAPE. REVIEW OF SYSTEMS:  All systems are reviewed and are found to be essentially negative except for the symptoms mentioned above. FAMILY HISTORY:  Mother had history of cancer. Father had history of diabetes, heart disease and hypertension. PHYSICAL EXAMINATION:  VITAL SIGNS:  Temperature 97.6, pulse 70, respiratory rate 15, blood pressure 137/49, pulse ox 94% on room air. GENERAL:  Alert x3, awake, no acute distress, resting in bed, pleasant female, appears to be stated age. HEENT:  Pupils equal and react to light. Dry mucous maintenance. Tympanic membranes clear. NECK:  Supple. CHEST:  Decreased basal breath sounds. CORONARY:  S1, S2 were heard. There is a systolic ejection murmur heard at the right upper sternal border. ABDOMEN:  Soft, nontender, nondistended. Bowel sounds physiologic. EXTREMITIES:  No clubbing, no cyanosis, no edema. NEUROPSYCHIATRIC:  Pleasant mood and affect. No focal neurologic deficits are noted. SKIN:  Warm.     LABORATORY DATA:  White count 12.7, hemoglobin 8.5, hematocrit 28.7, platelets 984. Sodium 141, potassium 3.8, chloride 107, bicarbonate 25, anion gap 9, glucose 107, BUN 28, creatinine 1.13, calcium 9.7, bilirubin total 0.5, albumin 3.8, ALT 28, AST 23, alkaline phosphatase 99. CK 92, troponin 0.05. Chest x-ray shows persistent cardiomegaly and small bilateral pleural effusions, right greater than left. EKG shows significant ST depression in lateral leads with some ST elevation in V1 and AVR. ASSESSMENT AND PLAN:  1. Gastrointestinal bleed. Patient complains of black stool on examination. On Hemoccult exam patient had significant black stools, but somehow the Hemoccult exam was negative. I am suspicious that the patient is having some gastrointestinal bleed as she has had a significant drop in hemoglobin in the last one month. We will admit the patient on a telemetry bed. Start patient on Protonix b.i.d.  Gastroenterology consult has been requested. Keep patient on clear liquid diet as of now. We will closely monitor hemoglobin and hematocrit, gentle IV hydration in light of history of congestive heart failure and continue to closely monitor. Further intervention will be per hospital course. Reassess as needed. Await Gastroenterology input. Patient may need EGD in the morning. Continue to closely monitor, will transfuse as needed. 2.  Hypoxia. Unclear etiology, could be cardiogenic. The patient has significant EKG changes, although denies any chest pain or shortness of breath, currently, maintaining oxygen saturations in the emergency room on room air. I will get an ABG. We will get a cardiology consult, cycle troponins. Unfortunately, cannot give patient any antiplatelets as she is most likely having some gastrointestinal bleed. The patient does not appear to be in congestive heart failure exacerbation at this point of time. Will provide DuoNeb.   We will get a CT of the chest and further intervention will be per hospital course, reassess as needed and continue to monitor. Await Cardiology input. 3.  Leukocytosis, appears to be reactive. We will continue to close monitoring. 4.  Hypertension. Currently, blood pressure is stable. Continue home medication. 5.  Gastrointestinal and deep venous thrombosis prophylaxis. The patient will be on sequential compression devices.       Cookie Galeazzi, MD MM / MN  D: 01/05/2018 16:51     T: 01/05/2018 17:25  JOB #: [id number]

## 2018-01-05 NOTE — ROUTINE PROCESS
TRANSFER - OUT REPORT:    Verbal report given to Kana Anderson RN(name) on Petra Montero  being transferred to (unit) for routine progression of care       Report consisted of patients Situation, Background, Assessment and   Recommendations(SBAR). Information from the following report(s) SBAR, ED Summary, Procedure Summary, MAR and Recent Results was reviewed with the receiving nurse. Lines:   Peripheral IV 01/05/18 Left Antecubital (Active)   Site Assessment Clean, dry, & intact 1/5/2018  1:52 PM   Phlebitis Assessment 0 1/5/2018  1:52 PM   Infiltration Assessment 0 1/5/2018  1:52 PM   Dressing Status Clean, dry, & intact 1/5/2018  1:52 PM   Dressing Type Transparent 1/5/2018  1:52 PM   Hub Color/Line Status Pink;Capped;Flushed;Patent 1/5/2018  1:52 PM   Action Taken Blood drawn 1/5/2018  1:52 PM        Opportunity for questions and clarification was provided.       Patient transported with:   [employer]

## 2018-01-06 NOTE — PROGRESS NOTES
Hospitalist Progress Note  Ludivina House MD        Date of Service:  2018  NAME:  Harsh Lim  :  1922  MRN:  [id number]      Admission Summary:   \"80year-old female with past medical history of hypertension, asthma, diabetes and left-sided breast cancer, who presented to the hospital with--(black stools). The patient has history of chronic pleural effusions and chronic respiratory failure, was seen at [facility] in 2016, was found to be hypoxic and was put on oxygen on a regular basis. The patient also has a history of chronic atrial fibrillation, but is not on anticoagulation due to bleeding risk and history of GI bleed. Patient also has history of chronic diastolic heart failure. The patient presents today because reports that since last evening she has been having dark tarry black stools. Patient reports initially they were \"lighter black\" in color, but later in the night and this morning it was very dark black. Patient also reports that she is supposed to wear oxygen, but has not been wearing it for about six months to eight months. The daughter who is present at the bedside reports that the patient's oxygen saturation has been between 96-98% on room air. Today, the nurse came and checked her oxygen saturation and she was found to have O2 saturation of around 82%. Patient was sent to the ED for further management and evaluation. The patient denies any other complaints or problems. The patient denies any chest pain associated with her symptoms. Denies any cough, runny nose, sore throat, fevers. Denies any exertional dyspnea more than usual.  Denies any swelling in her legs. Denies any hematemesis, hemoptysis or hematuria. Patient has had a drop of about 3 g of hemoglobin in the last one month on a CBC that was done in the ER and was requested to be admitted under the hospitalist service.   The patient denies any headache, blurry vision, sore throat, trouble swallowing, trouble with speech, any lightheadedness or dizziness, any abdominal pain, constipation, diarrhea, any fever, chills, nausea, vomiting, urinary symptoms, focal or generalized neurological weakness, falls,  injuries or any other concerns or problems. \"    Interval history / Subjective:   Patient without complaints this morning     Assessment & Plan:     GIB and SOB    -currently asymptomatic   -evaluated by cardiology with labs noted and okay for EGD  -likely scope by GI service  -on ppi  -trend h/h    Afib history, rate controlled  -as per cardiology    Hypertension  -on home meds  -adjust as needed    Code status: DNR  DVT prophylaxis: Cecelia Clemons discussed with: patient  Disposition: tbd     Hospital Problems  Date Reviewed: 1/5/2018          Codes Class Noted POA    * (Principal)SOB (shortness of breath) ICD-10-CM: R06.02  ICD-9-CM: 786.05  1/5/2018 Unknown                Review of Systems:   ROS negative other than noted in hpi/above      Vital Signs:    Last 24hrs VS reviewed since prior progress note. Most recent are:  Visit Vitals    /65 (BP 1 Location: Right arm, BP Patient Position: Sitting)    Pulse 90    Temp 97.6 °F (36.4 °C)    Resp 16    Wt 47 kg (103 lb 9.9 oz)    SpO2 90%    BMI 20.24 kg/m2         Intake/Output Summary (Last 24 hours) at 01/06/18 1329  Last data filed at 01/06/18 0911   Gross per 24 hour   Intake              360 ml   Output                0 ml   Net              360 ml        Physical Examination:             Constitutional:  No acute distress, cooperative, pleasant    ENT:  Oral mucous moist, oropharynx benign. Neck supple,    Resp:  CTA bilaterally. No wheezing. No accessory muscle use   CV:  Normal rate, no murmurs, gallops, rubs    GI:  Soft, non distended, non tender.  normoactive bowel sounds, no hepatosplenomegaly     Musculoskeletal:  No edema, warm, 2+ pulses throughout    Neurologic: Moves all extremities. AAOx3, LARS II-XII reviewed           Data Review:          Labs:     Recent Labs      01/06/18   0824  01/05/18   1931 01/05/18   1347   WBC   --    --   12.7*   HGB  7.8*  8.1*  8.5*   HCT  25.7*   --   28.7*   PLT   --    --   337     Recent Labs      01/06/18   0533  01/05/18   1347   NA  142  141   K  3.8  3.8   CL  110*  107   CO2  24  25   BUN  23*  28*   CREA  0.95  1.13*   GLU  85  107*   CA  9.2  9.7     Recent Labs      01/06/18   0533  01/05/18   1347   SGOT  25  23   ALT  23  28   AP  87  99   TBILI  0.4  0.5   TP  6.7  7.6   ALB  3.5  3.8   GLOB  3.2  3.8     Recent Labs      01/05/18   1509  01/05/18   1347   INR   --   1.2*   PTP   --   12.0*   APTT  22.7   --       No results for input(s): FE, TIBC, PSAT, FERR in the last 72 hours. Lab Results   Component Value Date/Time    Folate 29.3 03/31/2015 11:56 AM      No results for input(s): PH, PCO2, PO2 in the last 72 hours.   Recent Labs      01/05/18   2241  01/05/18   1931 01/05/18   1347   TROIQ  0.05*  0.05*  0.05*     Lab Results   Component Value Date/Time    Cholesterol, total 111 01/06/2018 05:33 AM    HDL Cholesterol 34 01/06/2018 05:33 AM    LDL, calculated 62.4 01/06/2018 05:33 AM    Triglyceride 73 01/06/2018 05:33 AM    CHOL/HDL Ratio 3.3 01/06/2018 05:33 AM     Lab Results   Component Value Date/Time    Glucose (POC) 146 04/28/2015 12:01 PM    Glucose (POC) 123 04/28/2015 07:08 AM    Glucose (POC) 161 04/27/2015 09:14 PM    Glucose (POC) 140 04/27/2015 05:21 PM    Glucose (POC) 86 04/01/2015 06:12 AM     Lab Results   Component Value Date/Time    Color YELLOW/STRAW 03/09/2016 08:30 PM    Appearance CLEAR 03/09/2016 08:30 PM    Specific gravity 1.022 03/09/2016 08:30 PM    pH (UA) 7.0 03/09/2016 08:30 PM    Protein TRACE 03/09/2016 08:30 PM    Glucose 100 03/09/2016 08:30 PM    Ketone NEGATIVE  03/09/2016 08:30 PM    Bilirubin NEGATIVE  03/09/2016 08:30 PM    Urobilinogen 1.0 03/09/2016 08:30 PM    Nitrites NEGATIVE 03/09/2016 08:30 PM    Leukocyte Esterase NEGATIVE  03/09/2016 08:30 PM    Epithelial cells FEW 03/09/2016 08:30 PM    Bacteria NEGATIVE  03/09/2016 08:30 PM    WBC 0-4 03/09/2016 08:30 PM    RBC 0-5 03/09/2016 08:30 PM         Medications Reviewed:     ______________________________________________________________________  EXPECTED LENGTH OF STAY: - - -  ACTUAL LENGTH OF STAY:          1                 Isidra Monae MD

## 2018-01-06 NOTE — CONSULTS
[address]  [facility], [address]       GASTROENTEROLOGY CONSULTATION NOTE        NAME:  Chanda Patel   :   1922   MRN:   [id number]           Consult Date: 2018 12:24 PM      History of Present Illness:  Patient is a 80 y. o. who is seen in consultation at the request of Dr. Opal Hawkins for GI bleed. She was admitted for melena and anemia, denies any pain, h/o bleeding AVM treated by clip on EGD done by Dr. Chacha Gauthier few years ago. PMH:  Past Medical History:   Diagnosis Date    Asthma     Cancer ([facility] [address]     LEFT breast     Diabetes ([facility] [address]     Hypertension        PSH:  Past Surgical History:   Procedure Laterality Date    HX BREAST LUMPECTOMY      LEFT breast     HX LAP CHOLECYSTECTOMY         Allergies: Allergies   Allergen Reactions    Adhesive Rash       Home Medications:  Prior to Admission Medications   Prescriptions Last Dose Informant Patient Reported? Taking? LORazepam (ATIVAN) 0.5 mg tablet   Yes Yes   Sig: Take 0.25 mg by mouth every eight (8) hours as needed for Anxiety. aspirin delayed-release 81 mg tablet 2018 at pm  Yes Yes   Sig: Take 81 mg by mouth every evening. digoxin (LANOXIN) 0.125 mg tablet 2018 at am  No Yes   Sig: Take 1 Tab by mouth every other day. dilTIAZem CD (CARTIA XT) 180 mg ER capsule 2018 at am  Yes Yes   Sig: Take 180 mg by mouth daily. furosemide (LASIX) 20 mg tablet 2018 at Unknown time  Yes Yes   Sig: Take 10 mg by mouth daily as needed. Indications: Edema   gabapentin (NEURONTIN) 300 mg capsule 2018 at am  Yes Yes   Sig: Take 300 mg by mouth every morning.   gabapentin (NEURONTIN) 300 mg capsule 2018 at pm  Yes Yes   Sig: Take 600 mg by mouth every evening. ibuprofen (MOTRIN) 200 mg tablet   Yes Yes   Sig: Take 200 mg by mouth every eight (8) hours as needed for Pain. lisinopril (PRINIVIL, ZESTRIL) 10 mg tablet 2018 at am  Yes Yes   Sig: Take 10 mg by mouth daily. melatonin 5 mg cap capsule 1/4/2018 at pm  Yes Yes   Sig: Take 5 mg by mouth nightly. montelukast (SINGULAIR) 10 mg tablet 1/4/2018 at pm  Yes Yes   Sig: Take 10 mg by mouth every evening. Facility-Administered Medications: None       Hospital Medications:  Current Facility-Administered Medications   Medication Dose Route Frequency    hydrALAZINE (APRESOLINE) tablet 10 mg  10 mg Oral Q8H PRN    0.9% sodium chloride infusion  100 mL/hr IntraVENous CONTINUOUS    digoxin (LANOXIN) tablet 0.125 mg  0.125 mg Oral EVERY OTHER DAY    gabapentin (NEURONTIN) capsule 300 mg  300 mg Oral 7am    lisinopril (PRINIVIL, ZESTRIL) tablet 10 mg  10 mg Oral DAILY    LORazepam (ATIVAN) tablet 0.5 mg  0.5 mg Oral Q8H PRN    montelukast (SINGULAIR) tablet 10 mg  10 mg Oral DAILY    sodium chloride (NS) flush 5-10 mL  5-10 mL IntraVENous Q8H    sodium chloride (NS) flush 5-10 mL  5-10 mL IntraVENous PRN    sodium chloride (NS) flush 5-10 mL  5-10 mL IntraVENous Q8H    sodium chloride (NS) flush 5-10 mL  5-10 mL IntraVENous PRN    0.9% sodium chloride infusion  50 mL/hr IntraVENous CONTINUOUS    nitroglycerin (NITROSTAT) tablet 0.4 mg  0.4 mg SubLINGual Q5MIN PRN    pantoprazole (PROTONIX) 40 mg in sodium chloride 0.9 % 10 mL injection  40 mg IntraVENous BID    albuterol-ipratropium (DUO-NEB) 2.5 MG-0.5 MG/3 ML  3 mL Nebulization Q4H RT    dilTIAZem CD (CARDIZEM CD) capsule 180 mg  180 mg Oral DAILY       Social History:  Social History   Substance Use Topics    Smoking status: Never Smoker    Smokeless tobacco: Never Used    Alcohol use No       Family History:  Family History   Problem Relation Age of Onset    Cancer Mother      ?  TYPE CA    Diabetes Father     Heart Disease Father     Hypertension Father     Lung Disease Paternal Grandfather     Asthma Neg Hx     Malignant Hyperthermia Neg Hx     Pseudocholinesterase Deficiency Neg Hx     Delayed Awakening Neg Hx     Post-op Nausea/Vomiting Neg Hx Review of Systems:  Constitutional: negative fever, negative chills, negative weight loss  Eyes:   negative visual changes  ENT:   negative sore throat, tongue or lip swelling  Respiratory:  negative cough, negative dyspnea  Cards:  negative for chest pain, palpitations, lower extremity edema  GI:   See HPI  :  negative for frequency, dysuria  Integument:  negative for rash and pruritus  Heme:  negative for easy bruising and gum/nose bleeding  Musculoskel: negative for myalgias,  back pain and muscle weakness  Neuro: negative for headaches, dizziness, vertigo  Psych:  negative for feelings of anxiety, depression     Objective:   Patient Vitals for the past 8 hrs:   BP Temp Pulse Resp SpO2 Weight   01/06/18 0825 156/65 97.6 °F (36.4 °C) 90 16 90 % -   01/06/18 0437 - - - - - 47 kg (103 lb 9.9 oz)     01/06 0701 - 01/06 1900  In: 120 [P.O.:120]  Out: -   01/04 1901 - 01/06 0700  In: 240 [P.O.:240]  Out: -     EXAM:     NEURO-a&o   HEENT-wnl   LUNGS-clear    COR-regular rate and rhythym     ABD-soft , no tenderness, no rebound, good bs     EXT-no edema     Data Review     Recent Labs      01/06/18   0824  01/05/18   1931  01/05/18   1347   WBC   --    --   12.7*   HGB  7.8*  8.1*  8.5*   HCT  25.7*   --   28.7*   PLT   --    --   337     Recent Labs      01/06/18   0533  01/05/18   1347   NA  142  141   K  3.8  3.8   CL  110*  107   CO2  24  25   BUN  23*  28*   CREA  0.95  1.13*   GLU  85  107*   CA  9.2  9.7     Recent Labs      01/06/18   0533  01/05/18   1347   SGOT  25  23   AP  87  99   TP  6.7  7.6   ALB  3.5  3.8   GLOB  3.2  3.8     Recent Labs      01/05/18   1509  01/05/18   1347   INR   --   1.2*   PTP   --   12.0*   APTT  22.7   --           Assessment:   · Anemia and melena, suspect UGI bleed, got clearance for EGD by cardiology     Patient Active Problem List   Diagnosis Code    Breast CA (HCC) C50.919    Anemia D64.9    Acute on chronic diastolic congestive heart failure (HCC) I50.33    Pneumonia J18.9    Respiratory failure, acute ([facility] [address] J96.00    Hypertension Y93    Metabolic acidosis A19.2    SOB (shortness of breath) R06.02     Plan:   · EGD today, pt is agreeable     Signed By: Elvia Mendes MD     1/6/2018  12:24 PM

## 2018-01-06 NOTE — PROCEDURES
[address]  [facility], [address] [place] [address] Pkw[address]        [facility]- Gastroduodenoscopy (EGD) Procedure Note    Cynthia Landeros  6/27/1922  [id number]      Procedure: Endoscopic Gastroduodenoscopy --diagnostic    Indication:  Melena/hematochezia     Pre-operative Diagnosis: see indication above    Post-operative Diagnosis: see findings below    : Criselda Art MD    Referring Provider:  Maegan Galarza MD      Anesthesia/Sedation:  Versed 2 mg IV and Fentanyl 25 mcg IV        Procedure Details     After infomed consent was obtained for the procedure, with all risks and benefits of procedure explained the patient was taken to the endoscopy suite and placed in the left lateral decubitus position. Following sequential administration of sedation as per above, the endoscope was inserted into the mouth and advanced under direct vision to third portion of the duodenum. A careful inspection was made as the gastroscope was withdrawn, including a retroflexed view of the proximal stomach; findings and interventions are described below. Findings:   Esophagus:hiatal hernia 7 cm in size   There was grade 3 erosive esophagitis non bleeding    There was around 2 cm long black structure embedded in the gasric wall of the hernia, could not remove with a snare, it can represent old clip, no bleeding around it  Stomach: normal   Duodenum/jejunum: normal      Therapies:  none    Specimens: none         EBL: None      Complications:   None; patient tolerated the procedure well. Impression:    -See post-procedure diagnoses.     Recommendations:  -Continue acid suppression.  -added carafate  -advanced diet    Signed By: Criselda Art MD     1/6/2018  2:12 PM

## 2018-01-06 NOTE — PROGRESS NOTES
Bedside and Verbal shift change report given to [address] [place] [address] 20 (oncoming nurse) by Kana Anderson RN (offgoing nurse). Report included the following information SBAR, Kardex, Intake/Output, MAR and Recent Results.

## 2018-01-06 NOTE — PROGRESS NOTES
Problem: Falls - Risk of  Goal: *Absence of Falls  Document Ismael Fall Risk and appropriate interventions in the flowsheet.   Outcome: Progressing Towards Goal  Fall Risk Interventions:  Mobility Interventions: Assess mobility with egress test, Bed/chair exit alarm, Communicate number of staff needed for ambulation/transfer, Mechanical lift, Patient to call before getting OOB         Medication Interventions: Patient to call before getting OOB, Teach patient to arise slowly

## 2018-01-06 NOTE — PROGRESS NOTES
Pt refuses treatment at this time.  Pt is in zero distress and no signs of increased WOB noted     01/05/18 2116   Pre-Treatment   Breathing Pattern Regular   Cough Dry;Strong;Non-productive   Breath Sounds Bilateral Clear   Left Breath Sounds Clear   Right Breath Sounds Clear   Pulse 82   SpO2 96 %   Respirations 18

## 2018-01-06 NOTE — PROGRESS NOTES
Problem: Falls - Risk of  Goal: *Absence of Falls  Document Ismael Fall Risk and appropriate interventions in the flowsheet.    Outcome: Progressing Towards Goal  Fall Risk Interventions:  Mobility Interventions: Patient to call before getting OOB         Medication Interventions: Patient to call before getting OOB                  Problem: Pressure Injury - Risk of  Goal: *Prevention of pressure ulcer  Outcome: Progressing Towards Goal   01/06/18 0831   Wound Prevention and Protection Methods   Orientation of Wound Prevention Posterior   Location of Wound Prevention Sacrum/Coccyx   Dressing Present  No   Read Only, Retired: Wound Treatment (non-mechanical)   Wound Offloading (Prevention Methods) Bed, pressure reduction mattress

## 2018-01-06 NOTE — ROUTINE PROCESS
TRANSFER - OUT REPORT:    Verbal report given to RN(name) on Amador Hu  being transferred to (unit) for routine progression of care       Report consisted of patients Situation, Background, Assessment and   Recommendations(SBAR). Information from the following report(s) SBAR, Procedure Summary and MAR was reviewed with the receiving nurse. Lines:   Peripheral IV 01/05/18 Left Antecubital (Active)   Site Assessment Clean, dry, & intact 1/6/2018  1:34 PM   Phlebitis Assessment 0 1/6/2018  1:34 PM   Infiltration Assessment 0 1/6/2018  1:34 PM   Dressing Status Clean, dry, & intact 1/6/2018  1:34 PM   Dressing Type Tape;Transparent 1/6/2018  1:34 PM   Hub Color/Line Status Pink; Infusing 1/6/2018  1:34 PM   Action Taken Open ports on tubing capped 1/6/2018  8:31 AM   Alcohol Cap Used Yes 1/6/2018  8:31 AM        Opportunity for questions and clarification was provided.       Patient transported with:   O2 @ 4 liters

## 2018-01-06 NOTE — PROGRESS NOTES
TRANSFER - IN REPORT:    Verbal report received from Page Kennedy (name) on Yolande Sherwood  being received from ED (unit) for routine progression of care      Report consisted of patients Situation, Background, Assessment and   Recommendations(SBAR). Information from the following report(s) SBAR, Kardex, ED Summary, Procedure Summary, Intake/Output, MAR and Recent Results was reviewed with the receiving nurse. Opportunity for questions and clarification was provided. Assessment completed upon patients arrival to unit and care assumed. Primary Nurse Barbara Ibarra and Donnie Juan RN performed a dual skin assessment on this patient. No major impairment noted. Scattered bruising and redness bilaterally on upper extremities.   Nithin score is 20

## 2018-01-06 NOTE — CONSULTS
[facility] CARDIOLOGY CONSULT              Date of  Admission: 1/5/2018  2:19 PM            Assessment and Plan: Petra Montero is admitted with GI bleed. # Persistent AF - rate controlled. ECG changes are consistent with digitalis effect and not toxicity. Will check dig level to verify. Dig is not ideal but if she has been stable on this for years and levels are low will continue. Overall she is rate controlled. - Trop negative. # GI bleed - no prior history of this, may need EGD, GI consult pending.  - ok to proceed with EGD from cardiac standpoint, if deemed necessary. Code status is DNR                 REASON FOR CONSULT: AF, abnormal ECG  Primary Cardiologist: Meg Sandra    HPI:  Pleasant 89 yof with history of HTN, chronic AF (not on anticoag) admitted with black stools. She denies any cp, sob. Here noted to have oxygen sats in the high 80s and anemia with hgb in 8 range from prior 11. She has no cardiac complaints. Admission ECG noted lateral st depressions, scooping type consistent with dig effect. She has prior history of UGI bleed. Was on oxygen after PNA a year ago but had not been on it recently. Troponins have been negative so far.        Patient Active Problem List    Diagnosis Date Noted    SOB (shortness of breath) 01/05/2018    Respiratory failure, acute ([address] 03/12/2016    Hypertension 83/02/3920    Metabolic acidosis 56/15/5014    Pneumonia 03/09/2016    Acute on chronic diastolic congestive heart failure ([address] 04/29/2015    Anemia 03/30/2015    Breast CA ([address] 04/12/2011      Jennifer Sherwood MD  Past Medical History:   Diagnosis Date    Asthma     Cancer [facility] 2011    LEFT breast     Diabetes ([address]     Hypertension       Past Surgical History:   Procedure Laterality Date    HX BREAST LUMPECTOMY  2011    LEFT breast     HX LAP CHOLECYSTECTOMY       Allergies   Allergen Reactions    Adhesive Rash      Family History   Problem Relation Age of Onset    Cancer Mother ? TYPE CA    Diabetes Father     Heart Disease Father     Hypertension Father     Lung Disease Paternal Grandfather     Asthma Neg Hx     Malignant Hyperthermia Neg Hx     Pseudocholinesterase Deficiency Neg Hx     Delayed Awakening Neg Hx     Post-op Nausea/Vomiting Neg Hx       Social History     Social History    Marital status:      Spouse name: N/A    Number of children: N/A    Years of education: N/A     Occupational History    Not on file.      Social History Main Topics    Smoking status: Never Smoker    Smokeless tobacco: Never Used    Alcohol use No    Drug use: Not on file    Sexual activity: Not on file     Other Topics Concern    Not on file     Social History Narrative     Current Facility-Administered Medications   Medication Dose Route Frequency    hydrALAZINE (APRESOLINE) tablet 10 mg  10 mg Oral Q8H PRN    0.9% sodium chloride infusion  100 mL/hr IntraVENous CONTINUOUS    digoxin (LANOXIN) tablet 0.125 mg  0.125 mg Oral EVERY OTHER DAY    gabapentin (NEURONTIN) capsule 300 mg  300 mg Oral 7am    lisinopril (PRINIVIL, ZESTRIL) tablet 10 mg  10 mg Oral DAILY    LORazepam (ATIVAN) tablet 0.5 mg  0.5 mg Oral Q8H PRN    montelukast (SINGULAIR) tablet 10 mg  10 mg Oral DAILY    sodium chloride (NS) flush 5-10 mL  5-10 mL IntraVENous Q8H    sodium chloride (NS) flush 5-10 mL  5-10 mL IntraVENous PRN    sodium chloride (NS) flush 5-10 mL  5-10 mL IntraVENous Q8H    sodium chloride (NS) flush 5-10 mL  5-10 mL IntraVENous PRN    0.9% sodium chloride infusion  50 mL/hr IntraVENous CONTINUOUS    nitroglycerin (NITROSTAT) tablet 0.4 mg  0.4 mg SubLINGual Q5MIN PRN    pantoprazole (PROTONIX) 40 mg in sodium chloride 0.9 % 10 mL injection  40 mg IntraVENous BID    albuterol-ipratropium (DUO-NEB) 2.5 MG-0.5 MG/3 ML  3 mL Nebulization Q4H RT    dilTIAZem CD (CARDIZEM CD) capsule 180 mg  180 mg Oral DAILY           Review of Symptoms:  A comprehensive review of systems was negative in 11 points other than stated above in HPI. Physical Exam    Visit Vitals    /64 (BP 1 Location: Right arm, BP Patient Position: At rest)    Pulse 76    Temp 97.3 °F (36.3 °C)    Resp 16    Wt 47 kg (103 lb 9.9 oz)    SpO2 97%    BMI 20.24 kg/m2     Skin warm and dry  HEENT: WNL  Oropharynx without exudate. Neck supple  Lungs clear  Heart - IRR, Normal S1/ S2   No Mummurs, click or Rubs  No S3 or S4  Abdomen soft and non tender,   Pulses 2+ throughout   Neuro:  Normal facial grimace,  Moves all extremities. AAAO   Psych: unanxious    Labs:   Recent Results (from the past 24 hour(s))   EKG, 12 LEAD, INITIAL    Collection Time: 01/05/18  1:40 PM   Result Value Ref Range    Ventricular Rate 76 BPM    Atrial Rate 82 BPM    QRS Duration 78 ms    Q-T Interval 336 ms    QTC Calculation (Bezet) 378 ms    Calculated R Axis 80 degrees    Calculated T Axis -133 degrees    Diagnosis       Atrial fibrillation  Possible Anteroseptal infarct (cited on or before 09-MAR-2016)  ST depression in Lateral leads Consider ischemia  ST elevation in V1 and AVR; Q waves in both V1 and AVR  When compared with ECG of 01-DEC-2017 11:10,  ST now depressed in Lateral leads  ST elevation now present in V1 and AVR  The heart rate has decreased  Confirmed by Elaine Brand M.D., Jarvis Trevino ([id number]) on 1/5/2018 2:25:38 PM     CBC WITH AUTOMATED DIFF    Collection Time: 01/05/18  1:47 PM   Result Value Ref Range    WBC 12.7 (H) 3.6 - 11.0 K/uL    RBC 3.33 (L) 3.80 - 5.20 M/uL    HGB 8.5 (L) 11.5 - 16.0 g/dL    HCT 28.7 (L) 35.0 - 47.0 %    MCV 86.2 80.0 - 99.0 FL    MCH 25.5 (L) 26.0 - 34.0 PG    MCHC 29.6 (L) 30.0 - 36.5 g/dL    RDW 15.5 (H) 11.5 - 14.5 %    PLATELET 989 307 - 780 K/uL    NEUTROPHILS 74 32 - 75 %    LYMPHOCYTES 18 12 - 49 %    MONOCYTES 7 5 - 13 %    EOSINOPHILS 0 0 - 7 %    BASOPHILS 1 0 - 1 %    ABS. NEUTROPHILS 9.5 (H) 1.8 - 8.0 K/UL    ABS. LYMPHOCYTES 2.2 0.8 - 3.5 K/UL    ABS.  MONOCYTES 0.9 0.0 - 1.0 K/UL ABS. EOSINOPHILS 0.0 0.0 - 0.4 K/UL    ABS. BASOPHILS 0.1 0.0 - 0.1 K/UL   METABOLIC PANEL, COMPREHENSIVE    Collection Time: 01/05/18  1:47 PM   Result Value Ref Range    Sodium 141 136 - 145 mmol/L    Potassium 3.8 3.5 - 5.1 mmol/L    Chloride 107 97 - 108 mmol/L    CO2 25 21 - 32 mmol/L    Anion gap 9 5 - 15 mmol/L    Glucose 107 (H) 65 - 100 mg/dL    BUN 28 (H) 6 - 20 MG/DL    Creatinine 1.13 (H) 0.55 - 1.02 MG/DL    BUN/Creatinine ratio 25 (H) 12 - 20      GFR est AA 54 (L) >60 ml/min/1.73m2    GFR est non-AA 45 (L) >60 ml/min/1.73m2    Calcium 9.7 8.5 - 10.1 MG/DL    Bilirubin, total 0.5 0.2 - 1.0 MG/DL    ALT (SGPT) 28 12 - 78 U/L    AST (SGOT) 23 15 - 37 U/L    Alk.  phosphatase 99 45 - 117 U/L    Protein, total 7.6 6.4 - 8.2 g/dL    Albumin 3.8 3.5 - 5.0 g/dL    Globulin 3.8 2.0 - 4.0 g/dL    A-G Ratio 1.0 (L) 1.1 - 2.2     CK W/ REFLX CKMB    Collection Time: 01/05/18  1:47 PM   Result Value Ref Range    CK 92 26 - 192 U/L   TROPONIN I    Collection Time: 01/05/18  1:47 PM   Result Value Ref Range    Troponin-I, Qt. 0.05 (H) <0.05 ng/mL   PROTHROMBIN TIME + INR    Collection Time: 01/05/18  1:47 PM   Result Value Ref Range    INR 1.2 (H) 0.9 - 1.1      Prothrombin time 12.0 (H) 9.0 - 11.1 sec   TYPE & SCREEN    Collection Time: 01/05/18  1:47 PM   Result Value Ref Range    Crossmatch Expiration 01/08/2018     ABO/Rh(D) A POSITIVE     Antibody screen NEG    OCCULT BLOOD, STOOL    Collection Time: 01/05/18  2:53 PM   Result Value Ref Range    Occult blood, stool NEGATIVE  NEG     PTT    Collection Time: 01/05/18  3:09 PM   Result Value Ref Range    aPTT 22.7 22.1 - 32.5 sec    aPTT, therapeutic range     58.0 - 77.0 SECS   POC G3 - PUL    Collection Time: 01/05/18  5:05 PM   Result Value Ref Range    pH (POC) 7.485 (H) 7.35 - 7.45      pCO2 (POC) 31.0 (L) 35.0 - 45.0 MMHG    pO2 (POC) 44 (LL) 80 - 100 MMHG    HCO3 (POC) 23.4 22 - 26 MMOL/L    sO2 (POC) 84 (L) 92 - 97 %    Base excess (POC) 0 mmol/L    Site LEFT BRACHIAL      Device: ROOM AIR      Allens test (POC) YES      Specimen type (POC) ARTERIAL      Total resp.  rate 16     TROPONIN I    Collection Time: 01/05/18  7:31 PM   Result Value Ref Range    Troponin-I, Qt. 0.05 (H) <0.05 ng/mL   TSH 3RD GENERATION    Collection Time: 01/05/18  7:31 PM   Result Value Ref Range    TSH 0.48 0.36 - 3.74 uIU/mL   HEMOGLOBIN    Collection Time: 01/05/18  7:31 PM   Result Value Ref Range    HGB 8.1 (L) 11.5 - 16.0 g/dL   TROPONIN I    Collection Time: 01/05/18 10:41 PM   Result Value Ref Range    Troponin-I, Qt. 0.05 (H) <0.05 ng/mL   EKG, 12 LEAD, INITIAL    Collection Time: 01/06/18 12:16 AM   Result Value Ref Range    Ventricular Rate 78 BPM    Atrial Rate 83 BPM    QRS Duration 80 ms    Q-T Interval 350 ms    QTC Calculation (Bezet) 399 ms    Calculated R Axis 74 degrees    Calculated T Axis -156 degrees    Diagnosis       Atrial fibrillation  Anteroseptal infarct (cited on or before 09-MAR-2016)  ST & T wave abnormality, consider inferolateral ischemia or digitalis effect  Abnormal ECG  When compared with ECG of 05-JAN-2018 13:40,  No significant change was found     LIPID PANEL    Collection Time: 01/06/18  5:33 AM   Result Value Ref Range    LIPID PROFILE          Cholesterol, total 111 <200 MG/DL    Triglyceride 73 <150 MG/DL    HDL Cholesterol 34 MG/DL    LDL, calculated 62.4 0 - 100 MG/DL    VLDL, calculated 14.6 MG/DL    CHOL/HDL Ratio 3.3 0 - 5.0     METABOLIC PANEL, COMPREHENSIVE    Collection Time: 01/06/18  5:33 AM   Result Value Ref Range    Sodium 142 136 - 145 mmol/L    Potassium 3.8 3.5 - 5.1 mmol/L    Chloride 110 (H) 97 - 108 mmol/L    CO2 24 21 - 32 mmol/L    Anion gap 8 5 - 15 mmol/L    Glucose 85 65 - 100 mg/dL    BUN 23 (H) 6 - 20 MG/DL    Creatinine 0.95 0.55 - 1.02 MG/DL    BUN/Creatinine ratio 24 (H) 12 - 20      GFR est AA >60 >60 ml/min/1.73m2    GFR est non-AA 55 (L) >60 ml/min/1.73m2    Calcium 9.2 8.5 - 10.1 MG/DL    Bilirubin, total 0.4 0.2 - 1.0 MG/DL    ALT (SGPT) 23 12 - 78 U/L    AST (SGOT) 25 15 - 37 U/L    Alk.  phosphatase 87 45 - 117 U/L    Protein, total 6.7 6.4 - 8.2 g/dL    Albumin 3.5 3.5 - 5.0 g/dL    Globulin 3.2 2.0 - 4.0 g/dL    A-G Ratio 1.1 1.1 - 2.2         Cardiographics    Telemetry: AF with controlled V rates   ECG: AF, scooping ST depression consistent with dig affect  Echocardiogram: n/a

## 2018-01-06 NOTE — ROUTINE PROCESS
Situation:  Verbal report received from: RN  Preoperative diagnosis: GI Bleed  Patient stated .     Background:  Procedure: Procedure(s):     Physician performing procedure;  MD Jett    Patient diabetic yes but no sugar checks  Patient taking blood thinners aspirin PTA  Patient has a defibrillator: no   Patient needs antibiotics: no     Assessment:  Vital signs stable yes  Alert and oriented yes  Abdominal assessment: round and soft yes      Recommendation:  Endoscopic Procedure  Family or Friend daughter  Permission to share finding with Family or Friend yes

## 2018-01-07 PROBLEM — R06.02 SOB (SHORTNESS OF BREATH): Status: RESOLVED | Noted: 2018-01-01 | Resolved: 2018-01-01

## 2018-01-07 NOTE — PROGRESS NOTES
Problem: Falls - Risk of  Goal: *Absence of Falls  Document Ismael Fall Risk and appropriate interventions in the flowsheet.    Outcome: Progressing Towards Goal  Fall Risk Interventions:  Mobility Interventions: Patient to call before getting OOB         Medication Interventions: Patient to call before getting OOB                  Problem: Pressure Injury - Risk of  Goal: *Prevention of pressure ulcer  Outcome: Progressing Towards Goal   01/07/18 0959   Wound Prevention and Protection Methods   Orientation of Wound Prevention Posterior   Location of Wound Prevention Sacrum/Coccyx   Dressing Present  No   Read Only, Retired: Wound Treatment (non-mechanical)   Wound Offloading (Prevention Methods) Bed, pressure reduction mattress

## 2018-01-07 NOTE — PROGRESS NOTES
Bedside and Verbal shift change report given to [address] [place] [address] (oncoming nurse) by Denise Montemayor RN (offgoing nurse). Report included the following information SBAR, Kardex, Intake/Output, MAR and Recent Results.

## 2018-01-07 NOTE — PROGRESS NOTES
Notified by floor that pt did not sleep well last night and that pt reported that she not only takes 300 mg gabapentin during the day but also 600 mg gabapentin at bedtime. Currently she only has the daytime 300 mg dose scheduled. Therefore added 600 mg gabapentin for bedtime.

## 2018-01-07 NOTE — PROGRESS NOTES
Bedside and Verbal shift change report given to [address] (oncoming nurse) by Erika Ortiz (offgoing nurse). Report included the following information SBAR, Kardex, ED Summary, Procedure Summary, Intake/Output, MAR and Recent Results.

## 2018-01-07 NOTE — PROGRESS NOTES
[address]  [address] [facility], [address] [place] [address]    GI PROGRESS NOTE    NAME: Jean-Pierre Pulliam   :  1922   MRN:  [id number]       Subjective:     Confused this am, getting better  Review of Systems    Constitutional: negative fever, negative chills, negative weight loss  Eyes:   negative visual changes  ENT:   negative sore throat, tongue or lip swelling  Respiratory:  negative cough, negative dyspnea  Cards:  negative for chest pain, palpitations, lower extremity edema  GI:   See HPI  :  negative for frequency, dysuria  Integument:  negative for rash and pruritus  Heme:  negative for easy bruising and gum/nose bleeding  Musculoskel: negative for myalgias,  back pain and muscle weakness  Neuro: negative for headaches, dizziness, vertigo  Psych:  negative for feelings of anxiety, depression         Objective:     VITALS:   Last 24hrs VS reviewed since prior progress note. Most recent are:  Visit Vitals    /58 (BP 1 Location: Right arm, BP Patient Position: At rest)    Pulse 92    Temp 96.6 °F (35.9 °C)    Resp 19    Wt 50.7 kg (111 lb 12.4 oz)    SpO2 93%    BMI 21.83 kg/m2       Intake/Output Summary (Last 24 hours) at 18 1512  Last data filed at 18 1724   Gross per 24 hour   Intake              240 ml   Output                0 ml   Net              240 ml     PHYSICAL EXAM:  General: WD, WN. Alert, cooperative, no acute distress    HEENT: NC, Atraumatic. PERRLA, EOMI. Anicteric sclerae. Lungs:  CTA Bilaterally. No Wheezing/Rhonchi/Rales. Heart:  Regular  rhythm,  No murmur (), No Rubs, No Gallops  Abdomen: Soft, Non distended, Non tender.  +Bowel sounds, no HSM  Extremities: No c/c/e  Neurologic:  CN 2-12 gi, Alert and oriented X 3. No acute neurological distress   Psych:   Good insight. Not anxious nor agitated.     Lab Data Reviewed:   Recent Labs      18   1347   WBC   --    --    --   12.7*   HGB  7.9*  7.3* < >  8.5*   HCT  26.0*  24.8*   < >  28.7*   PLT   --    --    --   337    < > = values in this interval not displayed.      Recent Labs      01/07/18   0726  01/06/18   0533   NA  138  142   K  4.0  3.8   CL  108  110*   CO2  21  24   BUN  23*  23*   CREA  1.14*  0.95   GLU  120*  85   CA  8.4*  9.2     Recent Labs      01/06/18   0533  01/05/18   1347   SGOT  25  23   AP  87  99   TP  6.7  7.6   ALB  3.5  3.8   GLOB  3.2  3.8       ________________________________________________________________________       Assessment:   · Anemia, suspect from severe erosive esophagitis on EGD yesterday     Patient Active Problem List   Diagnosis Code    [facility] ([facility] C50.919    Anemia D64.9    Acute on chronic diastolic congestive heart failure (HCC) I50.33    Pneumonia J18.9    Respiratory failure, acute ([facility] J96.00    Hypertension O74    Metabolic acidosis E35.2     Plan:   · Continue on current management  · Discussed status with daughter   · Dr. Bach Medal will follow in am     Signed By: Eli Carroll MD     1/7/2018  3:12 PM

## 2018-01-07 NOTE — PROGRESS NOTES
Hospitalist Progress Note  Nidia Jim MD        Date of Service:  2018  NAME:  Chanda Patel  :  1922  MRN:  [id number]      Admission Summary:   \"80year-old female with past medical history of hypertension, asthma, diabetes and left-sided breast cancer, who presented to the hospital with--(black stools). The patient has history of chronic pleural effusions and chronic respiratory failure, was seen at [facility] in 2016, was found to be hypoxic and was put on oxygen on a regular basis. The patient also has a history of chronic atrial fibrillation, but is not on anticoagulation due to bleeding risk and history of GI bleed. Patient also has history of chronic diastolic heart failure. The patient presents today because reports that since last evening she has been having dark tarry black stools. Patient reports initially they were \"lighter black\" in color, but later in the night and this morning it was very dark black. Patient also reports that she is supposed to wear oxygen, but has not been wearing it for about six months to eight months. The daughter who is present at the bedside reports that the patient's oxygen saturation has been between 96-98% on room air. Today, the nurse came and checked her oxygen saturation and she was found to have O2 saturation of around 82%. Patient was sent to the ED for further management and evaluation. The patient denies any other complaints or problems. The patient denies any chest pain associated with her symptoms. Denies any cough, runny nose, sore throat, fevers. Denies any exertional dyspnea more than usual.  Denies any swelling in her legs. Denies any hematemesis, hemoptysis or hematuria. Patient has had a drop of about 3 g of hemoglobin in the last one month on a CBC that was done in the ER and was requested to be admitted under the hospitalist service.   The patient denies any headache, blurry vision, sore throat, trouble swallowing, trouble with speech, any lightheadedness or dizziness, any abdominal pain, constipation, diarrhea, any fever, chills, nausea, vomiting, urinary symptoms, focal or generalized neurological weakness, falls,  injuries or any other concerns or problems. \"    Interval history / Subjective:   Patient without complaints this morning     Assessment & Plan:     GIB and SOB    -currently asymptomatic   -evaluated by cardiology with labs noted and okay for EGD  -s/p scope with report noted below  -downward trend in h/h  -on ppi  -trend h/h, transfuse as needed-f/u with cardiology    EGD findings:  Findings:   Esophagus:hiatal hernia 7 cm in size   There was grade 3 erosive esophagitis non bleeding   There was around 2 cm long black structure embedded in the gasric wall of the hernia, could not remove with a snare, it can represent old clip, no bleeding around it  Stomach: normal   Duodenum/jejunum: normal       Afib history, rate controlled  -as per cardiology    Hypertension  -on home meds  -adjust as needed    Code status: DNR  DVT prophylaxis: Jamila 1965 discussed with: patient  Disposition: tbd, On Monday: followup with GI for [address] Problems  Date Reviewed: 1/5/2018          Codes Class Noted POA    * (Principal)SOB (shortness of breath) ICD-10-CM: R06.02  ICD-9-CM: 786.05  1/5/2018 Unknown                Review of Systems:   ROS negative other than noted in hpi/above      Vital Signs:    Last 24hrs VS reviewed since prior progress note.  Most recent are:  Visit Vitals    /69 (BP 1 Location: Right arm, BP Patient Position: At rest)    Pulse (!) 107    Temp 98 °F (36.7 °C)    Resp 18    Wt 50.7 kg (111 lb 12.4 oz)    SpO2 92%    BMI 21.83 kg/m2         Intake/Output Summary (Last 24 hours) at 01/07/18 0734  Last data filed at 01/06/18 1724   Gross per 24 hour   Intake              360 ml   Output                0 ml Net              360 ml        Physical Examination:             Constitutional:  No acute distress, cooperative, pleasant    ENT:  Oral mucous moist, oropharynx benign. Neck supple,    Resp:  CTA bilaterally. No wheezing. No accessory muscle use   CV:  Normal rate, no gallops, rubs    GI:  Soft, non distended, non tender. normoactive bowel sounds, no hepatosplenomegaly     Musculoskeletal:  No edema, warm, 2+ pulses throughout    Neurologic:  Moves all extremities. AAOx3, CN II-XII reviewed           Data Review:          Labs:     Recent Labs      01/06/18   1927  01/06/18   0824   01/05/18   1347   WBC   --    --    --   12.7*   HGB  7.3*  7.8*   < >  8.5*   HCT  24.8*  25.7*   --   28.7*   PLT   --    --    --   337    < > = values in this interval not displayed. Recent Labs      01/06/18   0533  01/05/18   1347   NA  142  141   K  3.8  3.8   CL  110*  107   CO2  24  25   BUN  23*  28*   CREA  0.95  1.13*   GLU  85  107*   CA  9.2  9.7     Recent Labs      01/06/18   0533  01/05/18   1347   SGOT  25  23   ALT  23  28   AP  87  99   TBILI  0.4  0.5   TP  6.7  7.6   ALB  3.5  3.8   GLOB  3.2  3.8     Recent Labs      01/05/18   1509  01/05/18   1347   INR   --   1.2*   PTP   --   12.0*   APTT  22.7   --       No results for input(s): FE, TIBC, PSAT, FERR in the last 72 hours. Lab Results   Component Value Date/Time    Folate 29.3 03/31/2015 11:56 AM      No results for input(s): PH, PCO2, PO2 in the last 72 hours.   Recent Labs      01/05/18   2241  01/05/18   1931  01/05/18   1347   TROIQ  0.05*  0.05*  0.05*     Lab Results   Component Value Date/Time    Cholesterol, total 111 01/06/2018 05:33 AM    HDL Cholesterol 34 01/06/2018 05:33 AM    LDL, calculated 62.4 01/06/2018 05:33 AM    Triglyceride 73 01/06/2018 05:33 AM    CHOL/HDL Ratio 3.3 01/06/2018 05:33 AM     Lab Results   Component Value Date/Time    Glucose (POC) 146 04/28/2015 12:01 PM    Glucose (POC) 123 04/28/2015 07:08 AM    Glucose (POC) 161 04/27/2015 09:14 PM    Glucose (POC) 140 04/27/2015 05:21 PM    Glucose (POC) 86 04/01/2015 06:12 AM     Lab Results   Component Value Date/Time    Color YELLOW/STRAW 03/09/2016 08:30 PM    Appearance CLEAR 03/09/2016 08:30 PM    Specific gravity 1.022 03/09/2016 08:30 PM    pH (UA) 7.0 03/09/2016 08:30 PM    Protein TRACE 03/09/2016 08:30 PM    Glucose 100 03/09/2016 08:30 PM    Ketone NEGATIVE  03/09/2016 08:30 PM    Bilirubin NEGATIVE  03/09/2016 08:30 PM    Urobilinogen 1.0 03/09/2016 08:30 PM    Nitrites NEGATIVE  03/09/2016 08:30 PM    Leukocyte Esterase NEGATIVE  03/09/2016 08:30 PM    Epithelial cells FEW 03/09/2016 08:30 PM    Bacteria NEGATIVE  03/09/2016 08:30 PM    WBC 0-4 03/09/2016 08:30 PM    RBC 0-5 03/09/2016 08:30 PM         Medications Reviewed:     ______________________________________________________________________  EXPECTED LENGTH OF STAY: - - -  ACTUAL LENGTH OF STAY:          2                 Robin Nobles MD

## 2018-01-07 NOTE — PROGRESS NOTES
Problem: Falls - Risk of  Goal: *Absence of Falls  Document Ismeal Fall Risk and appropriate interventions in the flowsheet.    Outcome: Progressing Towards Goal  Fall Risk Interventions:  Mobility Interventions: Assess mobility with egress test, Bed/chair exit alarm, Communicate number of staff needed for ambulation/transfer, Patient to call before getting OOB         Medication Interventions: Bed/chair exit alarm, Evaluate medications/consider consulting pharmacy, Teach patient to arise slowly, Patient to call before getting OOB

## 2018-01-07 NOTE — PROGRESS NOTES
S Cardiology Progress Note                                        Admit Date: 1/5/2018      Assessment/Plan:   Nicholas Arrieta is admitted with GI bleed, EGD with esophagitis. # Persistent AF - rate controlled. ECG changes are consistent with digitalis effect and not toxicity.   -dig level 1, will stop and change to metoprolol for rate control. BP is adequate and if needed can also stop lisinopril to uptitrate bb. # GI bleed - no prior history of this. S/p EGD yestereday. # HTN - stable.     Code status is DNR    - PT will f/u with Dr. Jitendra Coronado, please call back with questions. Subjective:     Patient denies any complaints. Visit Vitals    /74 (BP 1 Location: Right arm, BP Patient Position: Sitting)    Pulse 92    Temp 97 °F (36.1 °C)    Resp 18    Wt 50.7 kg (111 lb 12.4 oz)    SpO2 93%    BMI 21.83 kg/m2       Intake/Output Summary (Last 24 hours) at 01/07/18 0834  Last data filed at 01/06/18 1724   Gross per 24 hour   Intake              360 ml   Output                0 ml   Net              360 ml       Objective:      Physical Exam:  HEENT: EOMI  Neck: supple  Resp:  CTA bilaterally;  No wheezes or rales  CV: IRR s1s2 No murmur no s3  Abd:Soft, Nontender  Ext: No edema  Neuro: Alert and oriented; Nonfocal  Skin: Warm, Dry, Intact      Telemetry:AF rate controlled    Current Facility-Administered Medications   Medication Dose Route Frequency    hydrALAZINE (APRESOLINE) tablet 10 mg  10 mg Oral Q8H PRN    sucralfate (CARAFATE) 100 mg/mL oral suspension 1 g  1 g Oral AC&HS    acetaminophen (TYLENOL) tablet 650 mg  650 mg Oral Q6H PRN    0.9% sodium chloride infusion  100 mL/hr IntraVENous CONTINUOUS    digoxin (LANOXIN) tablet 0.125 mg  0.125 mg Oral EVERY OTHER DAY    gabapentin (NEURONTIN) capsule 300 mg  300 mg Oral 7am    lisinopril (PRINIVIL, ZESTRIL) tablet 10 mg  10 mg Oral DAILY    LORazepam (ATIVAN) tablet 0.5 mg  0.5 mg Oral Q8H PRN    montelukast (SINGULAIR) tablet 10 mg 10 mg Oral DAILY    sodium chloride (NS) flush 5-10 mL  5-10 mL IntraVENous Q8H    sodium chloride (NS) flush 5-10 mL  5-10 mL IntraVENous PRN    sodium chloride (NS) flush 5-10 mL  5-10 mL IntraVENous Q8H    sodium chloride (NS) flush 5-10 mL  5-10 mL IntraVENous PRN    0.9% sodium chloride infusion  50 mL/hr IntraVENous CONTINUOUS    nitroglycerin (NITROSTAT) tablet 0.4 mg  0.4 mg SubLINGual Q5MIN PRN    pantoprazole (PROTONIX) 40 mg in sodium chloride 0.9 % 10 mL injection  40 mg IntraVENous BID    albuterol-ipratropium (DUO-NEB) 2.5 MG-0.5 MG/3 ML  3 mL Nebulization Q4H RT    dilTIAZem CD (CARDIZEM CD) capsule 180 mg  180 mg Oral DAILY         Data Review:   Labs:    Recent Results (from the past 24 hour(s))   HGB & HCT    Collection Time: 01/06/18  7:27 PM   Result Value Ref Range    HGB 7.3 (L) 11.5 - 16.0 g/dL    HCT 24.8 (L) 35.0 - 81.1 %   METABOLIC PANEL, BASIC    Collection Time: 01/07/18  7:26 AM   Result Value Ref Range    Sodium 138 136 - 145 mmol/L    Potassium 4.0 3.5 - 5.1 mmol/L    Chloride 108 97 - 108 mmol/L    CO2 21 21 - 32 mmol/L    Anion gap 9 5 - 15 mmol/L    Glucose 120 (H) 65 - 100 mg/dL    BUN 23 (H) 6 - 20 MG/DL    Creatinine 1.14 (H) 0.55 - 1.02 MG/DL    BUN/Creatinine ratio 20 12 - 20      GFR est AA 54 (L) >60 ml/min/1.73m2    GFR est non-AA 44 (L) >60 ml/min/1.73m2    Calcium 8.4 (L) 8.5 - 10.1 MG/DL   MAGNESIUM    Collection Time: 01/07/18  7:26 AM   Result Value Ref Range    Magnesium 2.0 1.6 - 2.4 mg/dL   HGB & HCT    Collection Time: 01/07/18  7:26 AM   Result Value Ref Range    HGB 7.9 (L) 11.5 - 16.0 g/dL    HCT 26.0 (L) 35.0 - 47.0 %

## 2018-01-08 NOTE — PROGRESS NOTES
Intensivist    79 yo with h/o htn, asthma and breast cancer admitted on 1/5 with dark stools. Hgb fairly stable during hospitalization (Hgb 8.5 to 7.5). Hgb was 11 last month. EGD done and found erosive esophagitis but no active bleeding. Has become more short of breath with increased O2 requirement    Has orders to be transferred to the ICU for bipap.   Pt is a DNR  Cardiac enzymes negative  CXR with cardiomegally and R>L pleural effusions  Previous h/o diastolic dysfunction   + fluid balance from admission    Patient Vitals for the past 4 hrs:   Temp Pulse Resp BP SpO2   01/08/18 1103 - 77 20 139/70 (!) 88 %   01/08/18 0940 - - - - 92 %   01/08/18 0912 97.3 °F (36.3 °C) 83 18 164/67 96 %         Intake/Output Summary (Last 24 hours) at 01/08/18 1157  Last data filed at 01/08/18 0914   Gross per 24 hour   Intake           4023.1 ml   Output              575 ml   Net           3448.1 ml     Mildly tachypneic  On FM O2  No accessory use  MM moist  Mild accessory use  Symmetrical chest expansion  Diminished bs at bases  Irreg  Soft NT  +BS  Warm and dry  Trace edema    Lab:  Recent Labs      01/08/18   0623  01/07/18   1950  01/07/18   0726   01/06/18   0533  01/05/18   2241  01/05/18   1931  01/05/18   1347   WBC   --    --    --    --    --    --    --   12.7*   HGB  7.8*  7.5*  7.9*   < >   --    --   8.1*  8.5*   PLT   --    --    --    --    --    --    --   337   NA   --    --   138   --   142   --    --   141   K   --    --   4.0   --   3.8   --    --   3.8   CL   --    --   108   --   110*   --    --   107   CO2   --    --   21   --   24   --    --   25   BUN   --    --   23*   --   23*   --    --   28*   CREA   --    --   1.14*   --   0.95   --    --   1.13*   GLU   --    --   120*   --   85   --    --   107*   CA   --    --   8.4*   --   9.2   --    --   9.7   MG   --    --   2.0   --    --    --    --    --    INR   --    --    --    --    --    --    --   1.2*   TROIQ   --    --    --    --    -- 0.05*  0.05*  0.05*   TBILI   --    --    --    --   0.4   --    --   0.5   SGOT   --    --    --    --   25   --    --   23    < > = values in this interval not displayed. ABG:  Recent Labs      01/08/18   1142  01/08/18   0438  01/05/18   1705   PHI  7.328*  7.373  7.485*   PCO2I  30.4*  30.3*  31.0*   PO2I  54*  58*  44*   HCO3I  15.9*  17.7*  23.4   SO2I  86*  90*  84*   FIO2I  55  50   --      Impression:    Acute hypoxemic resp failure secondary to volume overload and possible element of NAG met acidosis contributing to increased ventilatory needs    GI blood loss with anemia - HD stable - no active bleeding on EGD    H/o Afib    H/o Htn    DNR    --NIPPV if needed  --Diuresis  --?  If she might benefit from gentle transfusion even though her Hgb is > 7 it is 4 g down from her baseline from 1 month ago  --on cardizem and metoprolol for rate control of afib  --on bid protonix    Benji Terry MD

## 2018-01-08 NOTE — PROGRESS NOTES
Problem: Falls - Risk of  Goal: *Absence of Falls  Document Ismael Fall Risk and appropriate interventions in the flowsheet.    Outcome: Progressing Towards Goal  Fall Risk Interventions:  Mobility Interventions: Bed/chair exit alarm, Patient to call before getting OOB    Mentation Interventions: Adequate sleep, hydration, pain control, Bed/chair exit alarm, Door open when patient unattended    Medication Interventions: Bed/chair exit alarm, Patient to call before getting OOB    Elimination Interventions: Bed/chair exit alarm, Call light in reach, Patient to call for help with toileting needs             Problem: Pressure Injury - Risk of  Goal: *Prevention of pressure ulcer  Outcome: Progressing Towards Goal   01/08/18 1303   Wound Prevention and Protection Methods   Orientation of Wound Prevention Posterior   Location of Wound Prevention Sacrum/Coccyx   Dressing Present  No   Read Only, Retired: Wound Treatment (non-mechanical)   Wound Offloading (Prevention Methods) Bed, pressure reduction mattress

## 2018-01-08 NOTE — ROUTINE PROCESS
1145 received verbal report from [address] [place] [address] using sbar  [id number] pt arrived to ICU -condition stable-hooked up to our monitors- and alarms checked  Primary Nurse Tyree Blanco, SARA and Joshua Rahman RN performed a dual skin assessment on this patient No impairment noted  Nithin score is 17  1345 blood consent done and transfusion started  [id number] blood completed-  1800 pt remains on Venti mask sat will drop in mid 80's when its remove but revamps quickly-voiding without issue  1824 shift summary: BIPAP in room if needed-pt has been on Venti mask today at 50%- she mouth breaths-voiding able to drink- 2 PIV's- BP from 295 systolic to 058 - Afib- pt keeps asking the same questions over an over - needs frequent reorientation to what being done  Shift report given to [address] [place] [address] using sbar

## 2018-01-08 NOTE — ROUTINE PROCESS
Bedside and Verbal shift change report given to T[facility] (oncoming nurse) by Inna Holder RN (offgoing nurse). Report included the following information SBAR, Kardex and MAR.

## 2018-01-08 NOTE — PROGRESS NOTES
Hospitalist Progress Note  Snehal Noe MD        Date of Service:  2018  NAME:  Sushila Tinoco  :  1922  MRN:  [id number]      Admission Summary:   \"80year-old female with past medical history of hypertension, asthma, diabetes and left-sided breast cancer, who presented to the hospital with--(black stools). The patient has history of chronic pleural effusions and chronic respiratory failure, was seen at [facility] in 2016, was found to be hypoxic and was put on oxygen on a regular basis. The patient also has a history of chronic atrial fibrillation, but is not on anticoagulation due to bleeding risk and history of GI bleed. Patient also has history of chronic diastolic heart failure. The patient presents today because reports that since last evening she has been having dark tarry black stools. Patient reports initially they were \"lighter black\" in color, but later in the night and this morning it was very dark black. Patient also reports that she is supposed to wear oxygen, but has not been wearing it for about six months to eight months. The daughter who is present at the bedside reports that the patient's oxygen saturation has been between 96-98% on room air. Today, the nurse came and checked her oxygen saturation and she was found to have O2 saturation of around 82%. Patient was sent to the ED for further management and evaluation. The patient denies any other complaints or problems. The patient denies any chest pain associated with her symptoms. Denies any cough, runny nose, sore throat, fevers. Denies any exertional dyspnea more than usual.  Denies any swelling in her legs. Denies any hematemesis, hemoptysis or hematuria. Patient has had a drop of about 3 g of hemoglobin in the last one month on a CBC that was done in the ER and was requested to be admitted under the hospitalist service.   The patient denies any headache, blurry vision, sore throat, trouble swallowing, trouble with speech, any lightheadedness or dizziness, any abdominal pain, constipation, diarrhea, any fever, chills, nausea, vomiting, urinary symptoms, focal or generalized neurological weakness, falls,  injuries or any other concerns or problems. \"    Interval history / Subjective:   Patient without complaints this morning , eating breakfast hgb stable 7.8  I anm so weak can I be d/c'd tomorrow ? I want to work with the PT     Assessment & Plan:     GIB and SOB    -currently asymptomatic   -evaluated by cardiology with labs noted and okay for EGD  -s/p scope with report noted below  -hgb stable  -on ppi  -trend h/h, transfuse as needed-f/u with cardiology    EGD findings:  Findings:   Esophagus:hiatal hernia 7 cm in size   There was grade 3 erosive esophagitis non bleeding   There was around 2 cm long black structure embedded in the gasric wall of the hernia, could not remove with a snare, it can represent old clip, no bleeding around it  Stomach: normal   Duodenum/jejunum: normal       Afib history, rate controlled  -as per cardiology    Hypertension  -on home meds  -adjust as needed    Code status: DNR  DVT prophylaxis: [place] 1965 discussed with: patient  Disposition: Followup with GI for [address] Problems  Date Reviewed: 1/5/2018    None            Review of Systems:   ROS negative other than noted in hpi/above      Vital Signs:    Last 24hrs VS reviewed since prior progress note.  Most recent are:  Visit Vitals    /79    Pulse 73    Temp 97.4 °F (36.3 °C)    Resp 16    Ht 5' 3\" (1.6 m)    Wt 48 kg (105 lb 13.1 oz)    SpO2 92%    BMI 18.75 kg/m2         Intake/Output Summary (Last 24 hours) at 01/08/18 0834  Last data filed at 01/08/18 0340   Gross per 24 hour   Intake           3843.1 ml   Output              575 ml   Net           3268.1 ml        Physical Examination:             Constitutional:  No acute distress, cooperative, pleasant    ENT:  Oral mucous moist, oropharynx benign. Neck supple,    Resp:  CTA bilaterally. No wheezing. No accessory muscle use   CV:  Normal rate, no gallops, rubs    GI:  Soft, non distended, non tender. normoactive bowel sounds, no hepatosplenomegaly     Musculoskeletal:  No edema, warm, 2+ pulses throughout    Neurologic:  Moves all extremities. AAOx3, CN II-XII reviewed           Data Review:          Labs:     Recent Labs      01/08/18   0623  01/07/18   1950   01/05/18   1347   WBC   --    --    --   12.7*   HGB  7.8*  7.5*   < >  8.5*   HCT  25.8*  24.8*   < >  28.7*   PLT   --    --    --   337    < > = values in this interval not displayed. Recent Labs      01/07/18   0726  01/06/18   0533  01/05/18   1347   NA  138  142  141   K  4.0  3.8  3.8   CL  108  110*  107   CO2  21  24  25   BUN  23*  23*  28*   CREA  1.14*  0.95  1.13*   GLU  120*  85  107*   CA  8.4*  9.2  9.7   MG  2.0   --    --      Recent Labs      01/06/18   0533  01/05/18   1347   SGOT  25  23   ALT  23  28   AP  87  99   TBILI  0.4  0.5   TP  6.7  7.6   ALB  3.5  3.8   GLOB  3.2  3.8     Recent Labs      01/05/18   1509  01/05/18   1347   INR   --   1.2*   PTP   --   12.0*   APTT  22.7   --       No results for input(s): FE, TIBC, PSAT, FERR in the last 72 hours. Lab Results   Component Value Date/Time    Folate 29.3 03/31/2015 11:56 AM      No results for input(s): PH, PCO2, PO2 in the last 72 hours.   Recent Labs      01/05/18   2241  01/05/18   1931  01/05/18   1347   TROIQ  0.05*  0.05*  0.05*     Lab Results   Component Value Date/Time    Cholesterol, total 111 01/06/2018 05:33 AM    HDL Cholesterol 34 01/06/2018 05:33 AM    LDL, calculated 62.4 01/06/2018 05:33 AM    Triglyceride 73 01/06/2018 05:33 AM    CHOL/HDL Ratio 3.3 01/06/2018 05:33 AM     Lab Results   Component Value Date/Time    Glucose (POC) 146 04/28/2015 12:01 PM    Glucose (POC) 123 04/28/2015 07:08 AM    Glucose (POC) 161 04/27/2015 09:14 PM    Glucose (POC) 140 04/27/2015 05:21 PM    Glucose (POC) 86 04/01/2015 06:12 AM     Lab Results   Component Value Date/Time    Color YELLOW/STRAW 03/09/2016 08:30 PM    Appearance CLEAR 03/09/2016 08:30 PM    Specific gravity 1.022 03/09/2016 08:30 PM    pH (UA) 7.0 03/09/2016 08:30 PM    Protein TRACE 03/09/2016 08:30 PM    Glucose 100 03/09/2016 08:30 PM    Ketone NEGATIVE  03/09/2016 08:30 PM    Bilirubin NEGATIVE  03/09/2016 08:30 PM    Urobilinogen 1.0 03/09/2016 08:30 PM    Nitrites NEGATIVE  03/09/2016 08:30 PM    Leukocyte Esterase NEGATIVE  03/09/2016 08:30 PM    Epithelial cells FEW 03/09/2016 08:30 PM    Bacteria NEGATIVE  03/09/2016 08:30 PM    WBC 0-4 03/09/2016 08:30 PM    RBC 0-5 03/09/2016 08:30 PM         Medications Reviewed:     ______________________________________________________________________  EXPECTED LENGTH OF STAY: - - -  ACTUAL LENGTH OF STAY:          3                 aNhed Wong MD

## 2018-01-08 NOTE — PROGRESS NOTES
Bedside shift change report given to Ashley Ogden (oncoming nurse) by Rhina Sterling (offgoing nurse). Report included the following information SBAR, Kardex, MAR and Recent Results. 0200 Pt O2 stats via continuous pulse ox on 6 liters nasal cannula fluctuating from low 80s to 90.    0300 Paged respiratory and venturi mask applied. Stats between 84-91.    0345 Paged on call MD for chest xray.  Nuria ordered stat chest xray and ABGs

## 2018-01-08 NOTE — PROGRESS NOTES
Bedside and Verbal shift change report given to [address] (oncoming nurse) by Oamr Johnston (offgoing nurse). Report included the following information SBAR, Kardex, ED Summary, Procedure Summary, Intake/Output, MAR and Recent Results. 1145 TRANSFER - OUT REPORT:    Verbal report given to Jody Broderick (name) on Santiago Braga  being transferred to ICU (unit) for change in patient condition(O2 Stats)       Report consisted of patients Situation, Background, Assessment and   Recommendations(SBAR). Information from the following report(s) SBAR, Kardex, ED Summary, Procedure Summary, Intake/Output, MAR and Recent Results was reviewed with the receiving nurse. Lines:   Peripheral IV 01/05/18 Left Antecubital (Active)   Site Assessment Clean, dry, & intact 1/8/2018  1:00 AM   Phlebitis Assessment 0 1/8/2018  1:00 AM   Infiltration Assessment 0 1/8/2018  1:00 AM   Dressing Status Clean, dry, & intact 1/8/2018  1:00 AM   Dressing Type Transparent;Tape 1/8/2018  1:00 AM   Hub Color/Line Status Pink;Capped 1/8/2018  1:00 AM   Action Taken Open ports on tubing capped 1/8/2018  1:00 AM   Alcohol Cap Used Yes 1/8/2018  1:00 AM       Peripheral IV 01/06/18 Left Wrist (Active)   Site Assessment Clean, dry, & intact 1/8/2018  1:00 AM   Phlebitis Assessment 0 1/8/2018  1:00 AM   Infiltration Assessment 0 1/8/2018  1:00 AM   Dressing Status Clean, dry, & intact 1/8/2018  1:00 AM   Dressing Type Transparent;Tape 1/8/2018  1:00 AM   Hub Color/Line Status Blue;Capped 1/8/2018  1:00 AM   Action Taken Open ports on tubing capped 1/8/2018  1:00 AM   Alcohol Cap Used Yes 1/8/2018  1:00 AM        Opportunity for questions and clarification was provided.       Patient transported with:   O2 @ 6 liters

## 2018-01-09 NOTE — PROGRESS NOTES
1930: Bedside and Verbal shift change report given to PATO Riggs RN (oncoming nurse) by MORELIA Boles (offgoing nurse). Report included the following information SBAR, Kardex, ED Summary, Procedure Summary, Intake/Output, MAR, Accordion, Recent Results, Med Rec Status, Cardiac Rhythm Afib and Alarm Parameters . 0730: Bedside and Verbal shift change report given to Nurse, RN (oncoming nurse) by PATO Riggs RN (offgoing nurse). Report included the following information SBAR, Kardex, ED Summary, Intake/Output, MAR, Recent Results, Med Rec Status, Cardiac Rhythm Afib and Alarm Parameters . SHIFT SUMMARY:  Patient restless at beginning of shift, getting up out of bed, difficult to obtain good pleth reading. Patient uncooperative with ventimask. PRN Ativan given and placed on BiPAP, tolerated well, maintained sats at 100%, slept soundly. Removed BiPAP around 0600 to 6L nasal cannula, sats still at 100%. Patient has transfer orders to [facility]. Rechecked Hgb this AM, now at 9.1 (up from 7.8 pre-transfusion).

## 2018-01-09 NOTE — PROGRESS NOTES
Problem: Falls - Risk of  Goal: *Absence of Falls  Document Ismael Fall Risk and appropriate interventions in the flowsheet.    Outcome: Not Progressing Towards Goal  Fall Risk Interventions:  Mobility Interventions: Bed/chair exit alarm, Communicate number of staff needed for ambulation/transfer, Patient to call before getting OOB, Strengthening exercises (ROM-active/passive), Utilize walker, cane, or other assitive device    Mentation Interventions: Adequate sleep, hydration, pain control, Bed/chair exit alarm, Door open when patient unattended, Evaluate medications/consider consulting pharmacy, Eyeglasses and hearing aids, Increase mobility, More frequent rounding, Reorient patient, Room close to nurse's station, Toileting rounds, Update white board    Medication Interventions: Evaluate medications/consider consulting pharmacy, Teach patient to arise slowly, Patient to call before getting OOB, Utilize gait belt for transfers/ambulation    Elimination Interventions: Bed/chair exit alarm, Call light in reach, Patient to call for help with toileting needs, Toileting schedule/hourly rounds           Variance: Patient Uncooperative

## 2018-01-09 NOTE — PROGRESS NOTES
Hospitalist Progress Note  Pura Cisneros MD  Answering service: [phone] OR [phone] from in house phone         Date of Service:  2018  NAME:  Bony Vogel  :  1922  MRN:  [id number]    Admission Summary:   \"80year-old female with past medical history of hypertension, asthma, diabetes and left-sided breast cancer, who presented to the hospital with--(black stools).  The patient has history of chronic pleural effusions and chronic respiratory failure, was seen at [facility] [address] in 2016, was found to be hypoxic and was put on oxygen on a regular basis.  The patient also has a history of chronic atrial fibrillation, but is not on anticoagulation due to bleeding risk and history of GI bleed.  Patient also has history of chronic diastolic heart failure.  The patient presents today because reports that since last evening she has been having dark tarry black stools.  Patient reports initially they were \"lighter black\" in color, but later in the night and this morning it was very dark black.  Patient also reports that she is supposed to wear oxygen, but has not been wearing it for about six months to eight months.  The daughter who is present at the bedside reports that the patient's oxygen saturation has been between 96-98% on room air.  Today, the nurse came and checked her oxygen saturation and she was found to have O2 saturation of around 82%.  Patient was sent to the ED for further management and evaluation.  The patient denies any other complaints or problems.  The patient denies any chest pain associated with her symptoms.  Denies any cough, runny nose, sore throat, fevers.  Denies any exertional dyspnea more than usual.  Denies any swelling in her legs.  Denies any hematemesis, hemoptysis or hematuria.  Patient has had a drop of about 3 g of hemoglobin in the last one month on a CBC that was done in the ER and was requested to be admitted under the hospitalist service.  The patient denies any headache, blurry vision, sore throat, trouble swallowing, trouble with speech, any lightheadedness or dizziness, any abdominal pain, constipation, diarrhea, any fever, chills, nausea, vomiting, urinary symptoms, focal or generalized neurological weakness, falls,  injuries or any other concerns or problems. \"     Interval history / Subjective:     1/9:  Pt w/o distress. No issue  For cont icu status, can transition to medical bed. Transfer order placed.       Assessment & Plan:      GIB and SOB    -currently asymptomatic   -evaluated by cardiology with labs noted and okay for EGD  -s/p scope with report noted below  -hgb stable  -on ppi  -trend h/h, transfuse as needed-f/u with cardiology  Lab Results   Component Value Date/Time    Hemoglobin (POC) 10.9 10/04/2013 11:25 AM    HGB 9.1 01/09/2018 06:27 AM         EGD findings:  Findings:   Esophagus:hiatal hernia 7 cm in size   There was grade 3 erosive esophagitis non bleeding   There was around 2 cm long black structure embedded in the gasric wall of the hernia, could not remove with a snare, it can represent old clip, no bleeding around it  Stomach: normal   Duodenum/jejunum: normal         Afib history, rate controlled  -as per cardiology      Hypertension  -on home meds  -adjust as needed     Code status: DNR  DVT prophylaxis: [facility] discussed with: patient  Disposition: Followup with GI for [address] [facility] Problems  Date Reviewed: 1/5/2018    None            Review of Systems:   A comprehensive review of systems was negative. Vital Signs:    Last 24hrs VS reviewed since prior progress note.  Most recent are:  Visit Vitals    /59 (BP 1 Location: Right arm, BP Patient Position: At rest)    Pulse 77    Temp 98.1 °F (36.7 °C)    Resp 18    Ht 5' 3\" (1.6 m)    Wt 48.5 kg (106 lb 14.8 oz)    SpO2 98%    BMI 18.94 kg/m2         Intake/Output Summary (Last 24 hours) at 01/09/18 1740  Last data filed at 01/09/18 1200   Gross per 24 hour   Intake              300 ml   Output             1050 ml   Net             -750 ml        Physical Examination:             Constitutional:  No acute distress, cooperative, pleasant    ENT:  Oral mucous moist, oropharynx benign. Neck supple,    Resp:  CTA bilaterally. No wheezing/rhonchi/rales. No accessory muscle use   CV:  Regular rhythm, normal rate, no murmurs, gallops, rubs    GI:  Soft, non distended, non tender. normoactive bowel sounds, no hepatosplenomegaly     Musculoskeletal:  No edema, warm, 2+ pulses throughout    Neurologic:  Moves all extremities. AAOx3, CN II-XII reviewed     Psych:  Not anxious nor agitated. Data Review:    Review and/or order of clinical lab test      Labs:     Recent Labs      01/09/18   0627  01/08/18   0623   HGB  9.1*  7.8*   HCT  29.4*  25.8*     Recent Labs      01/07/18   0726   NA  138   K  4.0   CL  108   CO2  21   BUN  23*   CREA  1.14*   GLU  120*   CA  8.4*   MG  2.0     No results for input(s): SGOT, GPT, ALT, AP, TBIL, TBILI, TP, ALB, GLOB, GGT, AML, LPSE in the last 72 hours. No lab exists for component: AMYP, HLPSE  No results for input(s): INR, PTP, APTT in the last 72 hours. No lab exists for component: INREXT   No results for input(s): FE, TIBC, PSAT, FERR in the last 72 hours. Lab Results   Component Value Date/Time    Folate 29.3 03/31/2015 11:56 AM      No results for input(s): PH, PCO2, PO2 in the last 72 hours. No results for input(s): CPK, CKNDX, TROIQ in the last 72 hours.     No lab exists for component: CPKMB  Lab Results   Component Value Date/Time    Cholesterol, total 136 03/31/2015 03:23 AM    HDL Cholesterol 38 03/31/2015 03:23 AM    LDL, calculated 78.8 03/31/2015 03:23 AM    Triglyceride 96 03/31/2015 03:23 AM    CHOL/HDL Ratio 3.6 03/31/2015 03:23 AM     Lab Results   Component Value Date/Time    Glucose (POC) 146 04/28/2015 12:01 PM    Glucose (POC) 123 04/28/2015 07:08 AM    Glucose (POC) 161 04/27/2015 09:14 PM    Glucose (POC) 140 04/27/2015 05:21 PM    Glucose (POC) 86 04/01/2015 06:12 AM     Lab Results   Component Value Date/Time    Color YELLOW/STRAW 03/09/2016 08:30 PM    Appearance CLEAR 03/09/2016 08:30 PM    Specific gravity 1.022 03/09/2016 08:30 PM    pH (UA) 7.0 03/09/2016 08:30 PM    Protein TRACE 03/09/2016 08:30 PM    Glucose 100 03/09/2016 08:30 PM    Ketone NEGATIVE  03/09/2016 08:30 PM    Bilirubin NEGATIVE  03/09/2016 08:30 PM    Urobilinogen 1.0 03/09/2016 08:30 PM    Nitrites NEGATIVE  03/09/2016 08:30 PM    Leukocyte Esterase NEGATIVE  03/09/2016 08:30 PM    Epithelial cells FEW 03/09/2016 08:30 PM    Bacteria NEGATIVE  03/09/2016 08:30 PM    WBC 0-4 03/09/2016 08:30 PM    RBC 0-5 03/09/2016 08:30 PM         Medications Reviewed:     Current Facility-Administered Medications   Medication Dose Route Frequency    furosemide (LASIX) injection 20 mg  20 mg IntraVENous DAILY    gabapentin (NEURONTIN) capsule 600 mg  600 mg Oral QHS    albuterol-ipratropium (DUO-NEB) 2.5 MG-0.5 MG/3 ML  3 mL Nebulization Q4H PRN    hydrALAZINE (APRESOLINE) tablet 10 mg  10 mg Oral Q8H PRN    sucralfate (CARAFATE) 100 mg/mL oral suspension 1 g  1 g Oral AC&HS    acetaminophen (TYLENOL) tablet 650 mg  650 mg Oral Q6H PRN    gabapentin (NEURONTIN) capsule 300 mg  300 mg Oral 7am    lisinopril (PRINIVIL, ZESTRIL) tablet 10 mg  10 mg Oral DAILY    LORazepam (ATIVAN) tablet 0.5 mg  0.5 mg Oral Q8H PRN    montelukast (SINGULAIR) tablet 10 mg  10 mg Oral DAILY    sodium chloride (NS) flush 5-10 mL  5-10 mL IntraVENous Q8H    sodium chloride (NS) flush 5-10 mL  5-10 mL IntraVENous PRN    sodium chloride (NS) flush 5-10 mL  5-10 mL IntraVENous Q8H    sodium chloride (NS) flush 5-10 mL  5-10 mL IntraVENous PRN    nitroglycerin (NITROSTAT) tablet 0.4 mg  0.4 mg SubLINGual Q5MIN PRN    pantoprazole (PROTONIX) 40 mg in sodium chloride 0.9 % 10 mL injection  40 mg IntraVENous BID    dilTIAZem CD (CARDIZEM CD) capsule 180 mg  180 mg Oral DAILY     ______________________________________________________________________  EXPECTED LENGTH OF STAY: 3d 9h  ACTUAL LENGTH OF STAY:          4                 Darcie Cortez MD

## 2018-01-09 NOTE — PROGRESS NOTES
Pulmonary, Critical Care, and Sleep Medicine~Progress Note    Name: Darian Cedeno MRN: [id number]   : 1922 Hospital: [facility]    Date: 2018 9:11 AM Admission: 2018     IMPRESSION:   · Acute hypoxemic resp failure secondary to volume overload  · GI blood loss with anemia  · Hx of A fib   · HTN  · DNR      PLAN:   · Hgb stable  · Diuretics  · O2 titration above 90%  · Is not require NIV for wob breathing anymore  · Rate control   · Downgrade to telemetry since no requirement of NIV   · Discussed with RN      Daily Progression:    Resting in bed     I have reviewed the labs and previous days notes. Pertinent items are noted in HPI. OBJECTIVE:     Vital Signs:       Visit Vitals    /62    Pulse (!) 57    Temp 96.9 °F (36.1 °C)    Resp 20    Ht 5' 3\" (1.6 m)  Comment: drivers license    Wt 15.7 kg (106 lb 14.8 oz)    SpO2 91%    BMI 18.94 kg/m2      Temp (24hrs), Av.2 °F (36.2 °C), Min:96.8 °F (36 °C), Max:98.6 °F (37 °C)     Intake/Output:     Last shift:      Last 3 shifts:  1901 -  0700  In: 4353.1 [P.O.:720;  I.V.:3323.1]  Out: 1125 [Urine:1075]        Intake/Output Summary (Last 24 hours) at 18 0911  Last data filed at 18   Gross per 24 hour   Intake             1030 ml   Output              550 ml   Net              480 ml       Physical Exam:                                        Exam Findings Other   General: No resp distress noted, appears stated age    [de-identified]:  No ulcers, JVD not elevated, no cervical LAD    Chest: No pectus deformity, normal chest rise b/l    HEART:  IRR, no murmurs/rubs/gallops    Lungs:  CTA b/l, no rhonchi/crackles/wheeze, diminished BS at bases    ABD: Soft/NT, non rigid mildly distended    EXT: No cyanosis/clubbing/edema, normal peripheral pulses    Skin: No rashes or ulcers, no mottling    Neuro: Resting in bed Medications:  Current Facility-Administered Medications   Medication Dose Route Frequency    furosemide (LASIX) injection 20 mg  20 mg IntraVENous DAILY    gabapentin (NEURONTIN) capsule 600 mg  600 mg Oral QHS    albuterol-ipratropium (DUO-NEB) 2.5 MG-0.5 MG/3 ML  3 mL Nebulization Q4H PRN    hydrALAZINE (APRESOLINE) tablet 10 mg  10 mg Oral Q8H PRN    sucralfate (CARAFATE) 100 mg/mL oral suspension 1 g  1 g Oral AC&HS    acetaminophen (TYLENOL) tablet 650 mg  650 mg Oral Q6H PRN    gabapentin (NEURONTIN) capsule 300 mg  300 mg Oral 7am    lisinopril (PRINIVIL, ZESTRIL) tablet 10 mg  10 mg Oral DAILY    LORazepam (ATIVAN) tablet 0.5 mg  0.5 mg Oral Q8H PRN    montelukast (SINGULAIR) tablet 10 mg  10 mg Oral DAILY    sodium chloride (NS) flush 5-10 mL  5-10 mL IntraVENous Q8H    sodium chloride (NS) flush 5-10 mL  5-10 mL IntraVENous PRN    sodium chloride (NS) flush 5-10 mL  5-10 mL IntraVENous Q8H    sodium chloride (NS) flush 5-10 mL  5-10 mL IntraVENous PRN    nitroglycerin (NITROSTAT) tablet 0.4 mg  0.4 mg SubLINGual Q5MIN PRN    pantoprazole (PROTONIX) 40 mg in sodium chloride 0.9 % 10 mL injection  40 mg IntraVENous BID    dilTIAZem CD (CARDIZEM CD) capsule 180 mg  180 mg Oral DAILY       Labs:  ABG Recent Labs      01/08/18   1142  01/08/18   0438   PHI  7.328*  7.373   PCO2I  30.4*  30.3*   PO2I  54*  58*   HCO3I  15.9*  17.7*   SO2I  86*  90*   FIO2I  55  50        CBC Recent Labs      01/09/18   0627  01/08/18   0623  01/07/18   1950   HGB  9.1*  7.8*  7.5*   HCT  29.4*  25.8*  82.2*        Metabolic  Panel Recent Labs      01/07/18   0726   NA  138   K  4.0   CL  108   CO2  21   GLU  120*   BUN  23*   CREA  1.14*   CA  8.4*   MG  2.0        Pertinent Labs                ABNER Ordoñez  1/9/2018

## 2018-01-09 NOTE — PROGRESS NOTES
CM reviewed chart. Patient was admitted from the ED. \"The patient presents today because reports that since last evening she has been having dark tarry black stools.  Patient reports initially they were \"lighter black\" in color, but later in the night and this morning it was very dark black\" Patient has had a drop of about 3 g of hemoglobin in the last one month on a CBC that was done in the ER. Patient has a past medical history of hypertension, asthma, diabetes and left-sided breast cancer. CM met with patient, discussed discharge planning. She was alert, oriented and answered questions appropriately. Patient lives independently at the [facility]. She said she cooked her meals, has a lady who cleaned her apt every 2 weeks and her daughter took  her shopping and to her appointments. Patient uses home O2 at 2 L via [place]. Carmina Lott is her DME. She uses a cane for safe ambulation as needed and also uses her local [place] [facility] for prescriptions. Patient is a DNR. She plans to return home in care of her daughter. CM will continue to follow. Cameron Colorado MSA, RN, CRM  Care Management Interventions  PCP Verified by CM: Yes  Mode of Transport at Discharge:  Other (see comment) (Private car)  Transition of Care Consult (CM Consult): Discharge Planning  MyChart Signup: No  Discharge Durable Medical Equipment: No  Health Maintenance Reviewed: Yes  Physical Therapy Consult: No  Occupational Therapy Consult: No  Speech Therapy Consult: No  Current Support Network: Lives Alone  Confirm Follow Up Transport: Family  Plan discussed with Pt/Family/Caregiver: Yes  Discharge Location  Discharge Placement: Home with family assistance

## 2018-01-10 NOTE — PROGRESS NOTES
Hospitalist Progress Note  Cornelius Cardoza MD  Answering service: [phone] OR [phone] from in house phone         Date of Service:  1/10/2018  NAME:  Waleska Navarro  :  1922  MRN:  [id number]    Admission Summary:   \"80year-old female with past medical history of hypertension, asthma, diabetes and left-sided breast cancer, who presented to the hospital with--(black stools).  The patient has history of chronic pleural effusions and chronic respiratory failure, was seen at [address] in 2016, was found to be hypoxic and was put on oxygen on a regular basis.  The patient also has a history of chronic atrial fibrillation, but is not on anticoagulation due to bleeding risk and history of GI bleed.  Patient also has history of chronic diastolic heart failure.  The patient presents today because reports that since last evening she has been having dark tarry black stools.  Patient reports initially they were \"lighter black\" in color, but later in the night and this morning it was very dark black.  Patient also reports that she is supposed to wear oxygen, but has not been wearing it for about six months to eight months.  The daughter who is present at the bedside reports that the patient's oxygen saturation has been between 96-98% on room air.  Today, the nurse came and checked her oxygen saturation and she was found to have O2 saturation of around 82%.  Patient was sent to the ED for further management and evaluation.  The patient denies any other complaints or problems.  The patient denies any chest pain associated with her symptoms.  Denies any cough, runny nose, sore throat, fevers.  Denies any exertional dyspnea more than usual.  Denies any swelling in her legs.  Denies any hematemesis, hemoptysis or hematuria.  Patient has had a drop of about 3 g of hemoglobin in the last one month on a CBC that was done in the ER and was requested to be admitted under the hospitalist service.  The patient denies any headache, blurry vision, sore throat, trouble swallowing, trouble with speech, any lightheadedness or dizziness, any abdominal pain, constipation, diarrhea, any fever, chills, nausea, vomiting, urinary symptoms, focal or generalized neurological weakness, falls,  injuries or any other concerns or problems. \"     Interval history / Subjective:     1/9:  Pt w/o distress. No issue  For cont icu status, can transition to medical bed. Transfer order placed. 1/10:  No distress, feels well, need more PT/OT efforts to determine best option       Assessment & Plan:      GIB and SOB    -currently asymptomatic   -evaluated by cardiology with labs noted and okay for EGD  -s/p scope with report noted below  -hgb stable  -on ppi  -trend h/h, transfuse as needed-f/u with cardiology  Lab Results   Component Value Date/Time    Hemoglobin (POC) 10.9 10/04/2013 11:25 AM    HGB 9.1 01/09/2018 06:27 AM         EGD findings:  Findings:   Esophagus:hiatal hernia 7 cm in size   There was grade 3 erosive esophagitis non bleeding   There was around 2 cm long black structure embedded in the gasric wall of the hernia, could not remove with a snare, it can represent old clip, no bleeding around it  Stomach: normal   Duodenum/jejunum: normal         Afib history, rate controlled  -as per cardiology      Hypertension  -on home meds  -adjust as needed     Code status: DNR  DVT prophylaxis: [facility] discussed with: patient  Disposition: Followup with GI for [facility] Problems  Date Reviewed: 1/5/2018    None            Review of Systems:   A comprehensive review of systems was negative. Vital Signs:    Last 24hrs VS reviewed since prior progress note.  Most recent are:  Visit Vitals    /55 (BP 1 Location: Right arm, BP Patient Position: Sitting)    Pulse (!) 111    Temp 97.4 °F (36.3 °C)    Resp 24    Ht 5' 3\" (1.6 m)    Wt 48.4 kg (106 lb 11.2 oz)    SpO2 92%    BMI 18.9 kg/m2         Intake/Output Summary (Last 24 hours) at 01/10/18 1450  Last data filed at 01/10/18 1238   Gross per 24 hour   Intake              340 ml   Output              375 ml   Net              -35 ml        Physical Examination:             Constitutional:  No acute distress, cooperative, pleasant    ENT:  Oral mucous moist, oropharynx benign. Neck supple,    Resp:  CTA bilaterally. No wheezing/rhonchi/rales. No accessory muscle use   CV:  Regular rhythm, normal rate, no murmurs, gallops, rubs    GI:  Soft, non distended, non tender. normoactive bowel sounds, no hepatosplenomegaly     Musculoskeletal:  No edema, warm, 2+ pulses throughout    Neurologic:  Moves all extremities. AAOx3, CN II-XII reviewed     Psych:  Not anxious nor agitated. Data Review:    Review and/or order of clinical lab test      Labs:     Recent Labs      01/09/18   0627  01/08/18   0623   HGB  9.1*  7.8*   HCT  29.4*  25.8*     No results for input(s): NA, K, CL, CO2, BUN, CREA, GLU, CA, MG, PHOS, URICA in the last 72 hours. No results for input(s): SGOT, GPT, ALT, AP, TBIL, TBILI, TP, ALB, GLOB, GGT, AML, LPSE in the last 72 hours. No lab exists for component: AMYP, HLPSE  No results for input(s): INR, PTP, APTT in the last 72 hours. No lab exists for component: INREXT, INREXT   No results for input(s): FE, TIBC, PSAT, FERR in the last 72 hours. Lab Results   Component Value Date/Time    Folate 29.3 03/31/2015 11:56 AM      No results for input(s): PH, PCO2, PO2 in the last 72 hours. No results for input(s): CPK, CKNDX, TROIQ in the last 72 hours.     No lab exists for component: CPKMB  Lab Results   Component Value Date/Time    Cholesterol, total 136 03/31/2015 03:23 AM    HDL Cholesterol 38 03/31/2015 03:23 AM    LDL, calculated 78.8 03/31/2015 03:23 AM    Triglyceride 96 03/31/2015 03:23 AM    CHOL/HDL Ratio 3.6 03/31/2015 03:23 AM     Lab Results   Component Value Date/Time    Glucose (POC) 146 04/28/2015 12:01 PM    Glucose (POC) 123 04/28/2015 07:08 AM    Glucose (POC) 161 04/27/2015 09:14 PM    Glucose (POC) 140 04/27/2015 05:21 PM    Glucose (POC) 86 04/01/2015 06:12 AM     Lab Results   Component Value Date/Time    Color YELLOW/STRAW 03/09/2016 08:30 PM    Appearance CLEAR 03/09/2016 08:30 PM    Specific gravity 1.022 03/09/2016 08:30 PM    pH (UA) 7.0 03/09/2016 08:30 PM    Protein TRACE 03/09/2016 08:30 PM    Glucose 100 03/09/2016 08:30 PM    Ketone NEGATIVE  03/09/2016 08:30 PM    Bilirubin NEGATIVE  03/09/2016 08:30 PM    Urobilinogen 1.0 03/09/2016 08:30 PM    Nitrites NEGATIVE  03/09/2016 08:30 PM    Leukocyte Esterase NEGATIVE  03/09/2016 08:30 PM    Epithelial cells FEW 03/09/2016 08:30 PM    Bacteria NEGATIVE  03/09/2016 08:30 PM    WBC 0-4 03/09/2016 08:30 PM    RBC 0-5 03/09/2016 08:30 PM         Medications Reviewed:     Current Facility-Administered Medications   Medication Dose Route Frequency    furosemide (LASIX) injection 20 mg  20 mg IntraVENous DAILY    gabapentin (NEURONTIN) capsule 600 mg  600 mg Oral QHS    albuterol-ipratropium (DUO-NEB) 2.5 MG-0.5 MG/3 ML  3 mL Nebulization Q4H PRN    hydrALAZINE (APRESOLINE) tablet 10 mg  10 mg Oral Q8H PRN    sucralfate (CARAFATE) 100 mg/mL oral suspension 1 g  1 g Oral AC&HS    acetaminophen (TYLENOL) tablet 650 mg  650 mg Oral Q6H PRN    gabapentin (NEURONTIN) capsule 300 mg  300 mg Oral 7am    lisinopril (PRINIVIL, ZESTRIL) tablet 10 mg  10 mg Oral DAILY    LORazepam (ATIVAN) tablet 0.5 mg  0.5 mg Oral Q8H PRN    montelukast (SINGULAIR) tablet 10 mg  10 mg Oral DAILY    sodium chloride (NS) flush 5-10 mL  5-10 mL IntraVENous Q8H    sodium chloride (NS) flush 5-10 mL  5-10 mL IntraVENous PRN    sodium chloride (NS) flush 5-10 mL  5-10 mL IntraVENous Q8H    sodium chloride (NS) flush 5-10 mL  5-10 mL IntraVENous PRN    nitroglycerin (NITROSTAT) tablet 0.4 mg  0.4 mg SubLINGual Q5MIN PRN    pantoprazole (PROTONIX) 40 mg in sodium chloride 0.9 % 10 mL injection  40 mg IntraVENous BID    dilTIAZem CD (CARDIZEM CD) capsule 180 mg  180 mg Oral DAILY     ______________________________________________________________________  EXPECTED LENGTH OF STAY: 3d 9h  ACTUAL LENGTH OF STAY:          Arron Rubio MD

## 2018-01-10 NOTE — PROGRESS NOTES
Problem: Mobility Impaired (Adult and Pediatric)  Goal: *Acute Goals and Plan of Care (Insert Text)  Physical Therapy Goals  Initiated 1/10/2018  1. Patient will move from supine to sit and sit to supine , scoot up and down and roll side to side in bed with independence within 7 day(s). 2.  Patient will transfer from bed to chair and chair to bed with independence using the least restrictive device within 7 day(s). 3.  Patient will perform sit to stand with independence within 7 day(s). 4.  Patient will ambulate with modified independence for 300 feet with the least restrictive device within 7 day(s). 5.  Patient will improve Tinetti score by 4-5 points within 7 days. physical Therapy EVALUATION  Patient: Waleska Navarro (78 y.o. female)  Date: 1/10/2018  Primary Diagnosis: SOB (shortness of breath)  GI Bleed  Procedure(s) (LRB):  ESOPHAGOGASTRODUODENOSCOPY (EGD) (N/A) 4 Days Post-Op   Precautions:   Fall    ASSESSMENT :  Based on the objective data described below, the patient presents with impaired balance following stay in ICU for GI Bleed and SOB. On evaluation, patient able to perform all mobility with mod I-CGA. Required only min A to steady during one major LOB/path deviation. Ambulated with shortened step length but overall very fluid gait. Unable to achieve a pulse ox reading after gait but patient asymptomatic other than slight SOB which resolved with rest. RN aware. Manual pulse reading 92 bpm after gait. Left seated in chair. Recommend HHPT. Patient safe to ambulate to bathroom with assist x1 and SPC. Patient will benefit from skilled intervention to address the above impairments.   Patients rehabilitation potential is considered to be Good  Factors which may influence rehabilitation potential include:   [x]         None noted  []         Mental ability/status  []         Medical condition  []         Home/family situation and support systems  []         Safety awareness  [] Pain tolerance/management  []         Other:      PLAN :  Recommendations and Planned Interventions:  [x]           Bed Mobility Training             [x]    Neuromuscular Re-Education  [x]           Transfer Training                   []    Orthotic/Prosthetic Training  [x]           Gait Training                         []    Modalities  [x]           Therapeutic Exercises           []    Edema Management/Control  [x]           Therapeutic Activities            [x]    Patient and Family Training/Education  []           Other (comment):    Frequency/Duration: Patient will be followed by physical therapy  4 times a week to address goals. Discharge Recommendations: Home Health  Further Equipment Recommendations for Discharge: none     SUBJECTIVE:   Patient stated All the comforts of home!     OBJECTIVE DATA SUMMARY:   HISTORY:    Past Medical History:   Diagnosis Date    Asthma     Cancer ([facility] [address] 2011    LEFT breast     Diabetes ([facility] [address]     Hypertension      Past Surgical History:   Procedure Laterality Date    HX BREAST LUMPECTOMY  2011    LEFT breast     HX LAP CHOLECYSTECTOMY       Prior Level of Function/Home Situation: Lives alone in independent living community; uses home O2 and SPC, cooks all meals, has home cleaning service and daughter provides transportation  Personal factors and/or comorbidities impacting plan of care: [facility]    Home Situation  Home Environment: Independent living  # Steps to Enter: 0  One/Two Story Residence: One story  Living Alone: Yes  Support Systems: Child(carmen), Islam / landon community, Friends \ neighbors  Patient Expects to be Discharged to[de-identified]  (Independent Living)  Current DME Used/Available at Home: Cane, straight    EXAMINATION/PRESENTATION/DECISION MAKING:   Critical Behavior:  Neurologic State: Alert, Eyes open spontaneously  Orientation Level: Oriented X4  Cognition: Follows commands  Hearing:   Auditory  Auditory Impairment: Hard of hearing, bilateral  Hearing Aids/Status: Functioning  Range Of Motion:  AROM: Within functional limits  Strength:    Strength: Within functional limits  Tone & Sensation:   Tone: Normal  Sensation: Intact  Coordination:  Coordination: Within functional limits  Functional Mobility:  Bed Mobility:  Supine to Sit: Modified independent  Sit to Supine: Modified independent  Scooting: Modified independent  Transfers:  Sit to Stand: Stand-by asssistance  Stand to Sit: Stand-by asssistance  Balance:   Sitting: Intact  Standing: With support; Impaired  Standing - Static: Good  Standing - Dynamic : Fair  Ambulation/Gait Training:  Distance (ft): 100 Feet (ft)  Assistive Device: Gait belt;Cane, straight  Ambulation - Level of Assistance: Contact guard assistance;Minimal assistance (min A to steady path deviation)  Gait Abnormalities: Decreased step clearance; Path deviations (1 major path deviation)  Base of Support: Narrowed  Speed/Ilda: Pace decreased (<100 feet/min)  Step Length: Right shortened;Left shortened    Functional Measure:  Tinetti test:    Sitting Balance: 1  Arises: 1  Attempts to Rise: 2  Immediate Standing Balance: 1  Standing Balance: 1  Nudged: 0  Eyes Closed: 0  Turn 360 Degrees - Continuous/Discontinuous: 0  Turn 360 Degrees - Steady/Unsteady: 0  Sitting Down: 1  Balance Score: 7  Indication of Gait: 1  R Step Length/Height: 1  L Step Length/Height: 1  R Foot Clearance: 1  L Foot Clearance: 1  Step Symmetry: 1  Step Continuity: 1  Path: 1  Trunk: 0  Walking Time: 1  Gait Score: 9  Total Score: 16       Tinetti Test and G-code impairment scale:  Percentage of Impairment CH    0%   CI    1-19% CJ    20-39% CK    40-59% CL    60-79% CM    80-99% CN     100%   Tinetti  Score 0-28 28 23-27 17-22 12-16 6-11 1-5 0     3  Tinetti Tool Score Risk of Falls  <19 = High Fall Risk  19-24 = Moderate Fall Risk  25-28 = Low Fall Risk  Tinetti ME. Performance-Oriented Assessment of Mobility Problems in Elderly Patients. Vera 66; [id number].  (Scoring Description: PT Bulletin Feb. 10, 1993)    Older adults: Jocelynn Blas et al, 2009; n = 1000 [facility] elderly evaluated with ABC, JABARI, ADL, and IADL)  · Mean JABARI score for males aged 69-68 years = 26.21(3.40)  · Mean JABARI score for females age 69-68 years = 25.16(4.30)  · Mean JABARI score for males over 80 years = 23.29(6.02)  · Mean JABARI score for females over 80 years = 17.20(8.32)         G codes: In compliance with CMSs Claims Based Outcome Reporting, the following G-code set was chosen for this patient based on their primary functional limitation being treated: The outcome measure chosen to determine the severity of the functional limitation was the Tinetti with a score of 16/28 which was correlated with the impairment scale. ? Mobility - Walking and Moving Around:     - CURRENT STATUS: CK - 40%-59% impaired, limited or restricted    - GOAL STATUS: CJ - 20%-39% impaired, limited or restricted    - D/C STATUS:  ---------------To be determined---------------      Physical Therapy Evaluation Charge Determination   History Examination Presentation Decision-Making   HIGH Complexity :3+ comorbidities / personal factors will impact the outcome/ POC  MEDIUM Complexity : 3 Standardized tests and measures addressing body structure, function, activity limitation and / or participation in recreation  LOW Complexity : Stable, uncomplicated  Other outcome measures Tinetti 16/28  MEDIUM      Based on the above components, the patient evaluation is determined to be of the following complexity level: LOW     Pain:  Pain Scale 1: Numeric (0 - 10)  Pain Intensity 1: 0  Activity Tolerance:   Good  Please refer to the flowsheet for vital signs taken during this treatment.   After treatment:   [x]         Patient left in no apparent distress sitting up in chair  []         Patient left in no apparent distress in bed  [x]         Call bell left within reach  [x]         Nursing notified  []         Caregiver present  [] Bed alarm activated    COMMUNICATION/EDUCATION:   The patients plan of care was discussed with: Registered Nurse,  and Certified Nursing Assistant/Patient Care Technician. [x]         Fall prevention education was provided and the patient/caregiver indicated understanding. [x]         Patient/family have participated as able in goal setting and plan of care. [x]         Patient/family agree to work toward stated goals and plan of care. []         Patient understands intent and goals of therapy, but is neutral about his/her participation. []         Patient is unable to participate in goal setting and plan of care.     Thank you for this referral.  Narendra Hsieh, PT, DPT   Time Calculation: 14 mins

## 2018-01-10 NOTE — PROGRESS NOTES
1930: Bedside and Verbal shift change report given to PATO Gordillo (oncoming nurse) by Sarika Landry, SARA (offgoing nurse). Report included the following information SBAR, Kardex, ED Summary, Intake/Output, MAR, Recent Results, Cardiac Rhythm Afib and Alarm Parameters . 2000: Assumed care of patient. Alert, oriented to self and place, pleasant, NAZARIO with weakness, follows all commands. No c/o pain. VSS, afib, nasal cannula at 4L.      0000: Reassessment. No new issues. Patient resting comfortably in bed.     0400: Reassessment. No change. 0730: Bedside and Verbal shift change report given to Chandana Irving RN (oncoming nurse) by April Suh. Ryan Estrada RN (offgoing nurse). Report included the following information SBAR, Kardex, ED Summary, Procedure Summary, Intake/Output, MAR, Recent Results, Cardiac Rhythm Afib and Alarm Parameters .

## 2018-01-10 NOTE — PROGRESS NOTES
01/10/18 1359   Vital Signs   Temp 97.4 °F (36.3 °C)   Temp Source Axillary   Pulse (Heart Rate) (!) 111   Heart Rate Source Monitor   Resp Rate 24   O2 Sat (%) 92 %   Level of Consciousness Alert   /55   MAP (Calculated) 78   BP 1 Method Automatic   BP 1 Location Right arm   BP Patient Position Sitting   MEWS Score 4     MEW of 4 due to elevated HR. Patient has afib, will reassess heart rate manually.

## 2018-01-10 NOTE — PROGRESS NOTES
0800: VSS, patient sleeping, responds to voice, oriented x4, 4 L nasal cannula, up with one assist to bedside commode, 2 peripheral IVs.  1200: Reassessment unchanged, VSS, no complaints of pain. 1315: Report called to Cammie Ahumada, RN receiving patient to [address] [place] [address], Room 208.

## 2018-01-10 NOTE — ROUTINE PROCESS
0730: Bedside and Verbal shift change report given to Gilmar Scanlon RN (oncoming nurse) by Endy Chaudhary RN (offgoing nurse). Report included the following information SBAR, Kardex, Intake/Output, MAR, Recent Results, Cardiac Rhythm AFib and Alarm Parameters . 1320: TRANSFER - OUT REPORT:    Verbal report given to Elizabeth Abreu RN on Rosmery Rodriguez  being transferred to [facility], Room 208 for routine progression of care       Report consisted of patients Situation, Background, Assessment and   Recommendations(SBAR). Information from the following report(s) SBAR, Kardex, Intake/Output, MAR, Recent Results, Cardiac Rhythm AFib and Alarm Parameters  was reviewed with the receiving nurse. Lines:   Peripheral IV 01/05/18 Left Antecubital (Active)   Site Assessment Drainage (comment) 1/10/2018  8:00 AM   Phlebitis Assessment 0 1/10/2018  8:00 AM   Infiltration Assessment 0 1/10/2018  8:00 AM   Dressing Status Old drainage 1/10/2018  8:00 AM   Dressing Type Transparent;Tape 1/10/2018  8:00 AM   Hub Color/Line Status Pink;Capped 1/10/2018  8:00 AM   Action Taken Open ports on tubing capped 1/10/2018  8:00 AM   Alcohol Cap Used Yes 1/10/2018  8:00 AM       Peripheral IV 01/06/18 Left Wrist (Active)   Site Assessment Clean, dry, & intact 1/10/2018  8:00 AM   Phlebitis Assessment 0 1/10/2018  8:00 AM   Infiltration Assessment 0 1/10/2018  8:00 AM   Dressing Status Clean, dry, & intact 1/10/2018  4:00 AM   Dressing Type Transparent;Tape 1/10/2018  8:00 AM   Hub Color/Line Status Blue;Capped;Flushed;Patent 1/10/2018  8:00 AM   Action Taken Open ports on tubing capped 1/10/2018  8:00 AM   Alcohol Cap Used Yes 1/10/2018  8:00 AM        Opportunity for questions and clarification was provided.       Patient transported with:   Monitor  O2 @ 4 liters  Registered Nurse  [facility]

## 2018-01-10 NOTE — PROGRESS NOTES
Physical Therapy  1.10.18    Order received, chart reviewed. Note patient's latest documentation indicates elevated HR at rest. Will hold PT eval at this time and f/u tomorrow. Thank you.     Anna Goldman, PT, DPT

## 2018-01-10 NOTE — PROGRESS NOTES
Primary Nurse Libra Silva RN and Catherine Borges RN performed a dual skin assessment on this patient Impairment noted- patient has scratches on mid & lower back, stated \"I was itchy and scratched myself. \"  Nithin score is 17

## 2018-01-10 NOTE — PROGRESS NOTES
TRANSFER - IN REPORT:    Verbal report received from [facility] (name) on Edwin Mcgarry  being received from ICU (unit) for routine progression of care      Report consisted of patients Situation, Background, Assessment and   Recommendations(SBAR). Information from the following report(s) SBAR, Kardex, Intake/Output, MAR and Recent Results was reviewed with the receiving nurse. Opportunity for questions and clarification was provided. Assessment completed upon patients arrival to unit and care assumed.

## 2018-01-10 NOTE — PROGRESS NOTES
Problem: Falls - Risk of  Goal: *Absence of Falls  Document Ismael Fall Risk and appropriate interventions in the flowsheet. Outcome: Progressing Towards Goal  Fall Risk Interventions:  Mobility Interventions: Assess mobility with egress test, Bed/chair exit alarm, Communicate number of staff needed for ambulation/transfer, Patient to call before getting OOB    Mentation Interventions: Adequate sleep, hydration, pain control, Bed/chair exit alarm, Door open when patient unattended, Evaluate medications/consider consulting pharmacy, Eyeglasses and hearing aids, Increase mobility, More frequent rounding, Reorient patient, Room close to nurse's station, Update white board, Toileting rounds    Medication Interventions: Bed/chair exit alarm, Evaluate medications/consider consulting pharmacy, Patient to call before getting OOB, Teach patient to arise slowly    Elimination Interventions: Patient to call for help with toileting needs, Call light in reach, Bed/chair exit alarm     Patient now alert and oriented x2-4. Uses call bell appropriately when needing assistance.

## 2018-01-11 NOTE — DISCHARGE SUMMARY
Discharge Summary       PATIENT ID: Sushila Tinoco  MRN: [id number]   YOB: 1922    DATE OF ADMISSION: 1/5/2018  2:19 PM    DATE OF DISCHARGE: 1/11/2018    PRIMARY CARE PROVIDER: Jules Cortes MD     ATTENDING PHYSICIAN: Darcie Cortez MD   DISCHARGING PROVIDER: Darcie Cortez MD    To contact this individual call [phone] and ask the  to page. If unavailable ask to be transferred the Adult Hospitalist Department. CONSULTATIONS: IP CONSULT TO HOSPITALIST  IP CONSULT TO GASTROENTEROLOGY    PROCEDURES/SURGERIES: Procedure(s):  ESOPHAGOGASTRODUODENOSCOPY (EGD)    ADMITTING DIAGNOSES & HOSPITAL COURSE:       Admission Summary:   \"80year-old female with past medical history of hypertension, asthma, diabetes and left-sided breast cancer, who presented to the hospital with--(black stools).  The patient has history of chronic pleural effusions and chronic respiratory failure, was seen at [address] in 03/2016, was found to be hypoxic and was put on oxygen on a regular basis.  The patient also has a history of chronic atrial fibrillation, but is not on anticoagulation due to bleeding risk and history of GI bleed.  Patient also has history of chronic diastolic heart failure.  The patient presents today because reports that since last evening she has been having dark tarry black stools.  Patient reports initially they were \"lighter black\" in color, but later in the night and this morning it was very dark black.  Patient also reports that she is supposed to wear oxygen, but has not been wearing it for about six months to eight months.  The daughter who is present at the bedside reports that the patient's oxygen saturation has been between 96-98% on room air.  Today, the nurse came and checked her oxygen saturation and she was found to have O2 saturation of around 82%.  Patient was sent to the ED for further management and evaluation.  The patient denies any other complaints or problems.  The patient denies any chest pain associated with her symptoms.  Denies any cough, runny nose, sore throat, fevers.  Denies any exertional dyspnea more than usual.  Denies any swelling in her legs.  Denies any hematemesis, hemoptysis or hematuria.  Patient has had a drop of about 3 g of hemoglobin in the last one month on a CBC that was done in the ER and was requested to be admitted under the hospitalist service.  The patient denies any headache, blurry vision, sore throat, trouble swallowing, trouble with speech, any lightheadedness or dizziness, any abdominal pain, constipation, diarrhea, any fever, chills, nausea, vomiting, urinary symptoms, focal or generalized neurological weakness, falls,  injuries or any other concerns or problems. \"      Interval history / Subjective:      1/9:  Pt w/o distress. No issue  For cont icu status, can transition to medical bed. Transfer order placed.      1/10:  No distress, feels well, need more PT/OT efforts to determine best option       Assessment & Plan:       GIB and SOB    -currently asymptomatic   -evaluated by cardiology with labs noted and okay for EGD  -s/p scope with report noted below  -hgb stable  -on ppi  -trend h/h, transfuse as needed-f/u with cardiology     Results for Helen Griffiths (MRN [id number]) as of 1/11/2018 13:40   Ref.  Range 1/7/2018 07:26 1/7/2018 19:50 1/8/2018 06:23 1/9/2018 06:27 1/11/2018 02:36   HGB Latest Ref Range: 11.5 - 16.0 g/dL 7.9 (L) 7.5 (L) 7.8 (L) 9.1 (L) 8.6 (L)   HCT Latest Ref Range: 35.0 - 47.0 % 26.0 (L) 24.8 (L) 25.8 (L) 29.4 (L) 27.8 (L)         EGD findings:  Findings:   Esophagus:hiatal hernia 7 cm in size   There was grade 3 erosive esophagitis non bleeding   There was around 2 cm long black structure embedded in the gasric wall of the hernia, could not remove with a snare, it can represent old clip, no bleeding around it  Stomach: normal   Duodenum/jejunum: normal          Afib history, rate controlled  -as per cardiology  Having to hold ASA for now. Consider restarting in 2 weeks. Persistent AF - rate controlled. ECG changes are consistent with digitalis effect and not toxicity. Will check dig level to verify. Dig is not ideal but if she has been stable on this for years and levels are low will continue  ; restarted on discharge as had marina on BB.       Hypertension  -on home meds  -adjust as needed  BP Readings from Last 1 Encounters:   01/11/18 (!) 136/93          Code status: DNR  DVT prophylaxis: [address] [place] [address] discussed with: patient  Disposition: Followup with GI for dispo planning                  DISCHARGE DIAGNOSES / PLAN:      1.  n       PENDING TEST RESULTS:   At the time of discharge the following test results are still pending: na    FOLLOW UP APPOINTMENTS:    Follow-up Information     Follow up With Details Comments Contact Info    Lupe Gusman MD In 1 week not taken off NSAID's and ASA 2n2 GI bleed [id number] [address] [address] [address]  [phone]             ADDITIONAL CARE RECOMMENDATIONS: na    DIET: Resume previous diet and      ACTIVITY: Activity as tolerated    WOUND CARE: na    EQUIPMENT needed: na      DISCHARGE MEDICATIONS:  Current Discharge Medication List      CONTINUE these medications which have NOT CHANGED    Details   !! gabapentin (NEURONTIN) 300 mg capsule Take 600 mg by mouth every evening. furosemide (LASIX) 20 mg tablet Take 10 mg by mouth daily as needed. Indications: Edema      dilTIAZem CD (CARTIA XT) 180 mg ER capsule Take 180 mg by mouth daily. lisinopril (PRINIVIL, ZESTRIL) 10 mg tablet Take 10 mg by mouth daily. melatonin 5 mg cap capsule Take 5 mg by mouth nightly. LORazepam (ATIVAN) 0.5 mg tablet Take 0.25 mg by mouth every eight (8) hours as needed for Anxiety. digoxin (LANOXIN) 0.125 mg tablet Take 1 Tab by mouth every other day.   Qty: 30 Tab, Refills: 0      !! gabapentin (NEURONTIN) 300 mg capsule Take 300 mg by mouth every morning.      montelukast (SINGULAIR) 10 mg tablet Take 10 mg by mouth every evening. !! - Potential duplicate medications found. Please discuss with provider. STOP taking these medications       aspirin delayed-release 81 mg tablet Comments:   Reason for Stopping:         ibuprofen (MOTRIN) 200 mg tablet Comments:   Reason for Stopping:                 NOTIFY YOUR PHYSICIAN FOR ANY OF THE FOLLOWING:   Fever over 101 degrees for 24 hours. Chest pain, shortness of breath, fever, chills, nausea, vomiting, diarrhea, change in mentation, falling, weakness, bleeding. Severe pain or pain not relieved by medications. Or, any other signs or symptoms that you may have questions about. DISPOSITION:    Home With:   OT  PT  HH  RN       Long term SNF/Inpatient Rehab   x Independent/assisted living    Hospice    Other:       PATIENT CONDITION AT DISCHARGE:     Functional status    Poor     Deconditioned    x Independent      Cognition    x Lucid     Forgetful     Dementia      Catheters/lines (plus indication)    Geronimo     PICC     PEG    x None      Code status     Full code    x DNR      PHYSICAL EXAMINATION AT DISCHARGE:   Refer to Progress Note  Pul; clear  CV: irr controled.   Abd; soft non tender  Ext: supple   Visit Vitals    BP (!) 136/93 (BP 1 Location: Right arm, BP Patient Position: At rest)    Pulse 98    Temp 97.6 °F (36.4 °C)    Resp 16    Ht 5' 3\" (1.6 m)  Comment: drivers license    Wt 89.8 kg (102 lb 3.2 oz)    SpO2 90%    BMI 18.1 kg/m2             CHRONIC MEDICAL DIAGNOSES:  Problem List as of 1/11/2018  Date Reviewed: 1/5/2018          Codes Class Noted - Resolved    Respiratory failure, acute ([facility] ICD-10-CM: J96.00  ICD-9-CM: 518.81  3/12/2016 - Present        Hypertension ICD-10-CM: I10  ICD-9-CM: 401.9  3/12/2016 - Present        Metabolic acidosis PGM-19-CG: E87.2  ICD-9-CM: 276.2  3/12/2016 - Present        Pneumonia ICD-10-CM: J18.9  ICD-9-CM: 993  3/9/2016 - Present Acute on chronic diastolic congestive heart failure (HCC) (Chronic) ICD-10-CM: I50.33  ICD-9-CM: 428.33, 428.0  4/29/2015 - Present        Anemia ICD-10-CM: D64.9  ICD-9-CM: 285.9  3/30/2015 - Present        Breast CA ([facility] ICD-10-CM: C50.919  ICD-9-CM: 174.9  4/12/2011 - Present    Overview Signed 4/12/2011  3:01 PM by Isabelle Feliciano MD     Left Breast  3/11 UOQ T2NxMx grade 3, 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

## 2018-01-11 NOTE — PROGRESS NOTES
NUTRITION brief    Recommendations:   1. Reweigh on standing scale   2. Encourage PO intake with each meal   - should be drinking Ensure Compact if less than 75% meals consumed       Diet: regular  Supplements: Ensure Compact TID  Meal intake:  Patient Vitals for the past 100 hrs:   % Diet Eaten   01/10/18 1238 50 %   01/08/18 1924 30 %   01/08/18 0914 10 %   01/07/18 1739 5 %   01/07/18 0959 20 %     Chart reviewed for low wt. Admitted for GI bleed and SOB. Erosive esophagitis seen in EGD but diet advanced and tolerating well. Pt visited today and reports wt has been stable with good appetite at home. Did not like breakfast today but reviewed available food options/menu at beside. Drinks chocolate Ensure daily at home (usually breakfast) but doing well with Ensure Compact TID (660kcal, 27g protein). Will continue since higher calorie/protein content and pt agreeable. Current wt is bedscale, question accuracy. Please weigh on standing scale for better accuracy with some wt loss over past year noted. Wt Readings from Last 10 Encounters:   01/11/18 46.4 kg (102 lb 3.2 oz)   12/01/17 42.4 kg (93 lb 8 oz)   03/12/16 49.2 kg (108 lb 6.4 oz)   04/28/15 49.3 kg (108 lb 11 oz)   04/03/15 49.1 kg (108 lb 3.9 oz)   10/03/13 56.7 kg (125 lb)   06/01/11 61.2 kg (135 lb)     Will pt reporting good appetite and doing well with Ensure full assessment not indicated. Plan to rescreen per protocol.    Estimated Nutrition Needs:   Kcals/day: 1150 Kcals/day (1150-1288kcal)  Protein: 51 g (1.1g/kg)  Fluid: 1500 ml  Based On: Kcal/kg - specify (Comment) (25-28kcal/kg)  Weight Used: Actual wt (46kg)    Briana Cisneros RD

## 2018-01-11 NOTE — PROGRESS NOTES
Problem: Self Care Deficits Care Plan (Adult)  Goal: *Acute Goals and Plan of Care (Insert Text)  Occupational Therapy Goals  Initiated 1/11/2018  1. Patient will perform grooming standing at sink with modified independence within 7 day(s). 2.  Patient will perform upper body dressing with modified independence within 7 day(s). 3.  Patient will perform lower body dressing with modified independence within 7 day(s). 4.  Patient will perform toilet transfers with modified independence within 7 day(s). 5.  Patient will perform all aspects of toileting with modified independence within 7 day(s). 6.  Patient will participate in upper extremity therapeutic exercise/activities with independence for 38 minutes within 7 day(s). 7.  Patient will utilize energy conservation techniques during functional activities with verbal cues within 7 day(s). Occupational Therapy EVALUATION  Patient: Shanna Pan (83 y.o. female)  Date: 1/11/2018  Primary Diagnosis: SOB (shortness of breath)  GI Bleed  Procedure(s) (LRB):  ESOPHAGOGASTRODUODENOSCOPY (EGD) (N/A) 5 Days Post-Op   Precautions:   Fall    ASSESSMENT :  Based on the objective data described below, the patient presents with decreased activity tolerance due to need for supplemental O2, 93% on room air, 83% on room air post activity, required 2L NC and 3 minutes to recover to 97% SPO2. Currently CGA to SBA for functional mobility with HHA or SPC with fair balance, educated on pacing and activity modification for fall prevention and energy conservation. Currently, setup to mod I for ADLs. Patient would like to trial standard RW, did well with HHA but has primarily used rollator at home when needed. Daughter does grocery shopping and patient has 2 evening meals a week as patient lives alone at [place] Otherwise does all IADLs with family assist as needed. Recommend home with [place] at [place].     Patient will benefit from skilled intervention to address the above impairments. Patients rehabilitation potential is considered to be Good  Factors which may influence rehabilitation potential include:   [x]             None noted  []             Mental ability/status  []             Medical condition  []             Home/family situation and support systems  []             Safety awareness  []             Pain tolerance/management  []             Other:      PLAN :  Recommendations and Planned Interventions:  [x]               Self Care Training                  [x]        Therapeutic Activities  [x]               Functional Mobility Training    []        Cognitive Retraining  [x]               Therapeutic Exercises           [x]        Endurance Activities  [x]               Balance Training                   []        Neuromuscular Re-Education  []               Visual/Perceptual Training     [x]   Home Safety Training  [x]               Patient Education                 []        Family Training/Education  []               Other (comment):    Frequency/Duration: Patient will be followed by occupational therapy 3 times a week to address goals. Discharge Recommendations: Home Health  Further Equipment Recommendations for Discharge: has rollator and SPC     SUBJECTIVE:   Patient stated I want to try one of those walkers and see if I need it.     OBJECTIVE DATA SUMMARY:   HISTORY:   Past Medical History:   Diagnosis Date    Asthma     Cancer ([facility] 2011    LEFT breast     Diabetes ([facility]     Hypertension      Past Surgical History:   Procedure Laterality Date    HX BREAST LUMPECTOMY  2011    LEFT breast     HX LAP CHOLECYSTECTOMY         Prior Level of Function/Environment/Context: lives alone, doing all ADLs with Mod I, uses rollator in apartment and [facility] in community as needed, gets ride to [employer] [place] 2x weekly, otherwise daughter brings groceries etc  Occupations in which the patient is/was successful, what are the barriers preventing that success:   Performance Patterns (routines, roles, habits, and rituals):   Personal Interests and/or values:   Expanded or extensive additional review of patient history:     Home Situation  Home Environment: Independent living  # Steps to Enter: 0  One/Two Story Residence: One story  Living Alone: Yes  Support Systems: Child(carmen), [employer] / [employer] community, Friends \ neighbors  Patient Expects to be Discharged to[de-identified]  (Independent Living)  Current DME Used/Available at Home: Evelena Fragmin, rolling, Cane, straight  [x]  Right hand dominant   []  Left hand dominant    EXAMINATION OF PERFORMANCE DEFICITS:  Cognitive/Behavioral Status:  Neurologic State: Alert  Orientation Level: Appropriate for age  Cognition: Appropriate decision making; Appropriate for age attention/concentration; Appropriate safety awareness  Perception: Appears intact          Skin: intact    Edema: none noted    Hearing: Auditory  Auditory Impairment: Hard of hearing, bilateral  Hearing Aids/Status: Functioning    Vision/Perceptual:                           Acuity: Within Defined Limits    Corrective Lenses: Glasses    Range of Motion:  B UE  AROM: Within functional limits                         Strength:  b UE 4/5  Strength: Within functional limits                Coordination:  Coordination: Grossly decreased, non-functional  Fine Motor Skills-Upper: Left Intact; Right Intact         Tone & Sensation:    Tone: Normal  Sensation: Intact                      Balance:  Sitting: Intact  Standing: Impaired; With support  Standing - Static: Good  Standing - Dynamic : Fair    Functional Mobility and Transfers for ADLs:  Bed Mobility:       Transfers:  Sit to Stand: Modified independent  Stand to Sit: Modified independent  Bed to Chair: Contact guard assistance  Toilet Transfer : Stand-by asssistance    ADL Assessment:  Feeding: Independent    Oral Facial Hygiene/Grooming: Modified Independent    Bathing: Setup    Upper Body Dressing: Setup    Lower Body Dressing: Modified independent    Toileting: Stand by assistance                ADL Intervention and task modifications:                   Completed OT evaluation and ADLs seated EOB and standing as able with RW for balance. Educated on safety and endurance training with encouragement for full participation in ADLs while in hospital. Good understanding noted. Patient instructed and indicated understanding the benefits of maintaining activity tolerance, functional mobility, and independence with self care tasks during acute stay  to ensure safe return home and to baseline. Encouraged patient to increase frequency and duration OOB, be out of bed for all meals, perform daily ADLs (as approved by RN/MD regarding bathing etc), and performing functional mobility to/from bathroom. Educated patient on energy conservation techniques, strategies to maximize quality of life and decrease stress/anxiety. 1. Deep breathing  2. Educated on pacing and making sure he/she takes short frequent breaks (e.g. In the shower wash the upper body, rest for 1 minute, then wash the lower body, etc)  3. Educated on using cooler water in the shower so as to not get fatigued from the heat  4. Educated on drying off by using a natasha cloth robe  5. Educated on re-arranging his/her routine to allow for rest breaks in the morning routine  6. Educated on using a mantra and medication to decrease feelings of anxiety, especially when short of breath  7. Educated on looking at the consequences of his/her actions before deciding he/she needs to take on a task (e.g not getting down on one's hands and knees to wash floors because it will take all of one's energy for the day and result in exhaustion). 8. Educated on DME used to help conserve energy, such as a shower seat, a stool or chair in the kitchen, and pushing or pulling items instead of carrying them.   9. Educated on fall alert necklaces/bracelets to increase safety                       Therapeutic Exercise:  encouraged OOB and full participation with ADLs to improve strength and endurance. Functional Measure:  Barthel Index:    Bathin  Bladder: 10  Bowels: 10  Groomin  Dressing: 10  Feeding: 10  Mobility: 10  Stairs: 0  Toilet Use: 5  Transfer (Bed to Chair and Back): 10  Total: 75       Barthel and G-code impairment scale:  Percentage of impairment CH  0% CI  1-19% CJ  20-39% CK  40-59% CL  60-79% CM  80-99% CN  100%   Barthel Score 0-100 100 99-80 79-60 59-40 20-39 1-19   0   Barthel Score 0-20 20 17-19 13-16 9-12 5-8 1-4 0      The Barthel ADL Index: Guidelines  1. The index should be used as a record of what a patient does, not as a record of what a patient could do. 2. The main aim is to establish degree of independence from any help, physical or verbal, however minor and for whatever reason. 3. The need for supervision renders the patient not independent. 4. A patient's performance should be established using the best available evidence. Asking the patient, friends/relatives and nurses are the usual sources, but direct observation and common sense are also important. However direct testing is not needed. 5. Usually the patient's performance over the preceding 24-48 hours is important, but occasionally longer periods will be relevant. 6. Middle categories imply that the patient supplies over 50 per cent of the effort. 7. Use of aids to be independent is allowed. Shawna Hull., Barthel, D.W. ([id number]). Functional evaluation: the Barthel Index. [facility] (14)2. CLYDE Chicas, Vera Miguel., Terry Sheikh., [place], [address] [place] [address] (). Measuring the change indisability after inpatient rehabilitation; comparison of the responsiveness of the Barthel Index and Functional King Measure. Journal of Neurology, Neurosurgery, and Psychiatry, 66(4), 901-984.   Meg Vogel N.J.A, GUME Dey, & Citlaly Clifton M.A. (2004.) Assessment of post-stroke quality of life in cost-effectiveness studies: The usefulness of the Barthel Index and the EuroQoL-5D. Quality of Life Research, 13, 183-77       G codes: In compliance with CMSs Claims Based Outcome Reporting, the following G-code set was chosen for this patient based on their primary functional limitation being treated: The outcome measure chosen to determine the severity of the functional limitation was the barthel index with a score of 75/100 which was correlated with the impairment scale. ? Self Care:     - CURRENT STATUS: CJ - 20%-39% impaired, limited or restricted    - GOAL STATUS: CI - 1%-19% impaired, limited or restricted    - D/C STATUS:  ---------------To be determined---------------     Occupational Therapy Evaluation Charge Determination   History Examination Decision-Making   LOW Complexity : Brief history review  LOW Complexity : 1-3 performance deficits relating to physical, cognitive , or psychosocial skils that result in activity limitations and / or participation restrictions  LOW Complexity : No comorbidities that affect functional and no verbal or physical assistance needed to complete eval tasks       Based on the above components, the patient evaluation is determined to be of the following complexity level: LOW   Pain:  Pain Scale 1: Numeric (0 - 10)  Pain Intensity 1: 0  Pain Location 1: Head     Pain Description 1: Aching  Pain Intervention(s) 1: Medication (see MAR)  Activity Tolerance:   Fair requires Supplemental 02, 2L NC  Please refer to the flowsheet for vital signs taken during this treatment. After treatment:   [x] Patient left in no apparent distress sitting up in chair  [] Patient left in no apparent distress in bed  [x] Call bell left within reach  [x] Nursing notified  [] Caregiver present  [] Bed alarm activated    COMMUNICATION/EDUCATION:   The patients plan of care was discussed with: Registered Nurse.   [x] Home safety education was provided and the patient/caregiver indicated understanding. [x] Patient/family have participated as able in goal setting and plan of care. [x] Patient/family agree to work toward stated goals and plan of care. [] Patient understands intent and goals of therapy, but is neutral about his/her participation. [] Patient is unable to participate in goal setting and plan of care. This patients plan of care is appropriate for delegation to TIA.     Thank you for this referral.  Luc Nuno, JESSICA  Time Calculation: 17 mins

## 2018-01-11 NOTE — PROGRESS NOTES
Spiritual Care Partner Volunteer visited patient in 0 on 1.11.18.   Documented by:    Franklin Jarvis M.Div, M.S, Cristi 60 available at [phone])

## 2018-01-11 NOTE — PROGRESS NOTES
1/11/18 1355: CM s/w [facility] liaison Jenifer Mason. [facility] unable to start care until Monday 1/15. CM s/w patient and daughter Saritha Ocampo, both confirm that they are comfortable with 1/15 [facility] and prefer not to refer to another agency. Destiny plans to stay with patient through weekend. CM s/w hospitalist Dr. Shyanne Juarez, he also agrees with [facility] on 1/15/18. Bin Marsh, CLAUDIA/FANTA     ----------------------  1/11/18 1347  Patient medically clear for discharge home. PT/OT recommending HH. CM visited patient bedside, s/w patient and patient's daughter Saritha Ocampo. All agree with [facility] through [facility]. Referral sent to [facility] via [address] [facility] and agency info added to AVS.    Discharge home with family and no further CM needs.  Bin Marsh, MSW/CRM

## 2018-01-11 NOTE — ROUTINE PROCESS
Bedside and Verbal shift change report given to SÖDERBÄRK, [address] (oncoming nurse) by SÖDERBÄRK, [address] (offgoing nurse). Report included the following information SBAR, Kardex and MAR.

## 2018-01-11 NOTE — PROGRESS NOTES
Problem: Mobility Impaired (Adult and Pediatric)  Goal: *Acute Goals and Plan of Care (Insert Text)  Physical Therapy Goals  Initiated 1/10/2018  1. Patient will move from supine to sit and sit to supine , scoot up and down and roll side to side in bed with independence within 7 day(s). 2.  Patient will transfer from bed to chair and chair to bed with independence using the least restrictive device within 7 day(s). 3.  Patient will perform sit to stand with independence within 7 day(s). 4.  Patient will ambulate with modified independence for 300 feet with the least restrictive device within 7 day(s). 5.  Patient will improve Tinetti score by 4-5 points within 7 days. physical Therapy TREATMENT  Patient: Jannet Simpson (18 y.o. female)  Date: 1/11/2018  Diagnosis: SOB (shortness of breath)  GI Bleed SOB (shortness of breath)  Procedure(s) (LRB):  ESOPHAGOGASTRODUODENOSCOPY (EGD) (N/A) 5 Days Post-Op  Precautions: Fall    ASSESSMENT:  Patient received sitting up in chair and most agreeable to therapy. Daughter present. Patient moving well with transition movement and ambulated to the toilet with assist for oxygen tank. Managed well at toilet. Ambulated in the [place] with cane and did have one instance of LOB as turned to stop and rest.  Oxygen saturation drop to 84% during gait but with rest and PLB increased to 90%. Discussed with family and patient, to slow down. She has a rollator at home but feel she should be assessed with that by home health PT to insure safe use. Daughter plans on staying with her for a couple of nights.   Patient is cleared for discharge from PT standpoint:  YES [x]     NO []     Progression toward goals:  []    Improving appropriately and progressing toward goals  []    Improving slowly and progressing toward goals  []    Not making progress toward goals and plan of care will be adjusted     PLAN:  Patient continues to benefit from skilled intervention to address the above impairments. Continue treatment per established plan of care. Discharge Recommendations:  Home Health  Further Equipment Recommendations for Discharge:  none     SUBJECTIVE:   Patient stated I feel good.     OBJECTIVE DATA SUMMARY:   Critical Behavior:  Neurologic State: Alert  Orientation Level: Appropriate for age  Cognition: Appropriate decision making, Appropriate for age attention/concentration, Appropriate safety awareness     Functional Mobility Training:     Transfers:  Sit to Stand: Modified independent  Stand to Sit: Modified independent        Bed to Chair: Contact guard assistance         Balance:  Sitting: Intact  Standing: Impaired; With support  Standing - Static: Good  Standing - Dynamic : Fair  Ambulation/Gait Training:  Distance (ft): 175 Feet (ft)  Assistive Device: Gait belt;Cane, straight  Ambulation - Level of Assistance: Supervision        Gait Abnormalities: Path deviations        Base of Support: Narrowed     Speed/Ilda: Accelerated    Pain:  Pain Scale 1: Numeric (0 - 10)  Pain Intensity 1: 0  Pain Location 1: Head     Pain Description 1: Aching  Pain Intervention(s) 1: Medication (see MAR)  Activity Tolerance:   Desaturates on 2 liters with gait. May need to increase to 3 liters  Please refer to the flowsheet for vital signs taken during this treatment.   After treatment:   [x]    Patient left in no apparent distress sitting up in chair  []    Patient left in no apparent distress in bed  [x]    Call bell left within reach  [x]    Nursing notified  []    Caregiver present  []    Bed alarm activated    COMMUNICATION/COLLABORATION:   The patients plan of care was discussed with: Registered Nurse    Aquiles Davalos PT   Time Calculation: 20 mins

## 2018-01-11 NOTE — DISCHARGE INSTRUCTIONS
Discharge Instructions       PATIENT ID: Petra Montero  MRN: [id number]   YOB: 1922    DATE OF ADMISSION: 1/5/2018  2:19 PM    DATE OF DISCHARGE: 1/11/2018    PRIMARY CARE PROVIDER: Jennifer Sherwood MD     ATTENDING PHYSICIAN: Baldo Delgadillo MD  DISCHARGING PROVIDER: Baldo Delgadillo MD    To contact this individual call [phone] and ask the  to page. If unavailable ask to be transferred the Adult Hospitalist Department. DISCHARGE DIAGNOSES see dc noted, had Upper GI bleed     CONSULTATIONS: IP CONSULT TO HOSPITALIST  IP CONSULT TO GASTROENTEROLOGY    PROCEDURES/SURGERIES: Procedure(s):  ESOPHAGOGASTRODUODENOSCOPY (EGD)    PENDING TEST RESULTS:   At the time of discharge the following test results are still pending: na    FOLLOW UP APPOINTMENTS:   Follow-up Information     Follow up With Details Comments Contact Info    Jennifer Sherwood MD In 1 week not taken off NSAID's and ASA 2n2 GI bleed [address] [facility] [address] [facility] [id number] [phone]             ADDITIONAL CARE RECOMMENDATIONS: na    DIET: Resume previous diet     ACTIVITY: Activity as tolerated    WOUND CARE: na    EQUIPMENT needed: na      DISCHARGE MEDICATIONS:   See Medication Reconciliation Form    · It is important that you take the medication exactly as they are prescribed. · Keep your medication in the bottles provided by the pharmacist and keep a list of the medication names, dosages, and times to be taken in your wallet. · Do not take other medications without consulting your doctor. NOTIFY YOUR PHYSICIAN FOR ANY OF THE FOLLOWING:   Fever over 101 degrees for 24 hours. Chest pain, shortness of breath, fever, chills, nausea, vomiting, diarrhea, change in mentation, falling, weakness, bleeding. Severe pain or pain not relieved by medications. Or, any other signs or symptoms that you may have questions about.       DISPOSITION:    Home With:   OT  PT  [facility]  RN        SNF/Inpatient Rehab/LTAC   x Independent/assisted living    Hospice    Other:        Signed:   Bernardino Rosas MD  1/11/2018  1:38 PM

## 2018-12-29 PROBLEM — R55 SYNCOPE: Status: ACTIVE | Noted: 2018-01-01

## 2018-12-29 NOTE — ED TRIAGE NOTES
Patient arrives from home reports walking to bathroom today felt \"woozy\" and fell to the floor, denies hitting head or any pain.

## 2018-12-29 NOTE — PROGRESS NOTES
Admission Medication Reconciliation: 
 
Information obtained from:  Patient, family members Comments:  
Patient was alert and oriented during interview. Updated PTA meds/reviewed patient's allergies the list provided by family member. 1)  Medication changes (since last review): Adjusted 
-Lasix (was 10 mg, now 20 mg daily) 
-Pantoprazole (was 40mg BID, now 40 mg daily) Removed 
-Zolpidem 
-Ativan 2) Allergies/ adverse side effects: 
- Ativan, patient experience AMS and agitation. 3) Digoxin: patient takes every other day. Family and patient were not able to verify when it was taken last. (Of note: patient HAS NOT have any of her medications today), recommend to start digoxin tomorrow. Allergies:  Beta-blockers (beta-adrenergic blocking agts); Lorazepam; and Adhesive Significant PMH/Disease States:  
Past Medical History:  
Diagnosis Date  Asthma  Cancer [facility]) 2011 LEFT breast   
 Diabetes ([facility] [address]  Hypertension Chief Complaint for this Admission: Chief Complaint Patient presents with  Syncope Prior to Admission Medications:  
Prior to Admission Medications Prescriptions Last Dose Informant Patient Reported? Taking?  
digoxin (LANOXIN) 0.125 mg tablet   No Yes Sig: Take 1 Tab by mouth every other day. dilTIAZem CD (CARTIA XT) 180 mg ER capsule 12/28/2018 at Unknown time  Yes Yes Sig: Take 180 mg by mouth daily. furosemide (LASIX) 20 mg tablet 12/28/2018 at Unknown time  Yes Yes Sig: Take 20 mg by mouth daily as needed. gabapentin (NEURONTIN) 300 mg capsule 12/28/2018 at Unknown time  Yes Yes Sig: Take 300 mg by mouth every morning.  
gabapentin (NEURONTIN) 300 mg capsule 12/28/2018 at Unknown time  Yes Yes Sig: Take 600 mg by mouth every evening. lisinopril (PRINIVIL, ZESTRIL) 10 mg tablet 12/28/2018 at Unknown time  Yes Yes Sig: Take 10 mg by mouth daily. montelukast (SINGULAIR) 10 mg tablet 12/28/2018 at Unknown time  Yes Yes Sig: Take 10 mg by mouth every evening. pantoprazole (PROTONIX) 40 mg tablet 12/28/2018 at Unknown time  Yes Yes Sig: Take 40 mg by mouth daily. before breakfast and before dinner Facility-Administered Medications: None

## 2018-12-29 NOTE — ROUTINE PROCESS
TRANSFER - OUT REPORT: 
 
Verbal report given to Karlie Baum RN(name) on [facility] Adjutant  being transferred to [facility](unit) for routine progression of care Report consisted of patients Situation, Background, Assessment and  
Recommendations(SBAR). Information from the following report(s) SBAR, Kardex, Intake/Output, MAR, Recent Results and Cardiac Rhythm A-fib was reviewed with the receiving nurse. Lines:  
Peripheral IV 12/29/18 Right Wrist (Active) Site Assessment Clean, dry, & intact 12/29/2018 11:56 AM  
Phlebitis Assessment 0 12/29/2018 11:56 AM  
Infiltration Assessment 0 12/29/2018 11:56 AM  
Dressing Status Clean, dry, & intact 12/29/2018 11:56 AM  
Dressing Type Transparent 12/29/2018 11:56 AM  
Hub Color/Line Status Pink;Flushed;Patent 12/29/2018 11:56 AM  
Action Taken Blood drawn 12/29/2018 11:56 AM  
  
 
Opportunity for questions and clarification was provided.

## 2018-12-29 NOTE — PROGRESS NOTES
TRANSFER - IN REPORT: 
 
Verbal report received from Doreen(name) on Carmen Child  being received from ER(unit) for routine progression of care Report consisted of patients Situation, Background, Assessment and  
Recommendations(SBAR). Information from the following report(s) SBAR, ED Summary, Procedure Summary, Intake/Output, MAR, Recent Results, Med Rec Status and Cardiac Rhythm Controlled A Fib was reviewed with the receiving nurse. Opportunity for questions and clarification was provided. Assessment completed upon patients arrival to unit and care assumed.

## 2018-12-29 NOTE — ED NOTES
Patient Ambulated around nurses station needing to take a break. Denies dizziness, but states \"Im just exhausted\"

## 2018-12-29 NOTE — H&P
[address] HISTORY AND PHYSICAL Elisa Childs 
MR#: [id number] : 1922 ACCOUNT #: [de-identified] ADMIT DATE: 2018 TIME OF DICTATION: 1451 hours. PRIMARY CARE PHYSICIAN:  Marc Bal MD.  
 
CHIEF COMPLAINT:  Exhaustion with near syncope. HISTORY OF PRESENT ILLNESS:  Please note patient's daughter at the bedside contributed to this history. 70-year-old female with a past medical history significant for chronic congestive heart failure, primary hypertension, diet-controlled diabetes mellitus, asthma, left breast cancer and neuropathy  was brought to the emergency room by EMS from home for evaluation of a 3-4 day history of generalized weakness, fatigue and an episode of near passing out on the morning of the emergency room presentation. The patient was in the bathroom at home on the morning of the emergency room presentation, felt really exhausted and slipped down onto the floor without hitting her head. Patient denied any loss of consciousness,  prior headaches, dizziness, visual deficits, chest pains, palpitations, nausea, vomiting, cough or sputum production, fevers, chills, abdominal pain, bladder or bowel irregularities. PAST MEDICAL HISTORY: 
1. Chronic congestive heart failure. 2.  Atrial fibrillation without any chronic anticoagulation. 3.  Asthma. 4.  Diet-controlled diabetes mellitus. 5.  Left breast cancer. 6.  Primary hypertension. 7. GI bleed PAST SURGICAL HISTORY: 1. Left breast lumpectomy. 2.  Laparoscopic cholecystectomy. HOME MEDICATIONS: 
1. Digoxin 0.125 mg p.o. daily. 2.  Cartia  mg p.o. daily. 3.  Lasix 20 mg p.o. daily. 4.  Neurontin 300 mg p.o. every morning. 5.  Neurontin 600 mg p.o. every evening. 6.  Lisinopril 10 mg p.o. daily. 7.  Montelukast 10 mg p.o. every evening. 8.  Pantoprazole 40 mg p.o. daily before breakfast. ALLERGIES: 
1.  ATIVAN. 2.  ADHESIVE TAPE. 3.  BETA BLOCKERS. SOCIAL HISTORY:  Significant for living alone at home, is independent of activities and instrumental activities of daily living. Denies any cigarette smoking, denies alcohol use disorder. FAMILY HISTORY:  Reviewed and positive for diabetes mellitus and coronary artery disease, however,  no history of premature coronary artery death. No family history of malignancy. REVIEW OF SYSTEMS: 
CONSTITUTIONAL:  Positive for generalized fatigue. Negative for any recent changes in appetite or weight loss. Negative for fevers. HEENT:  Negative for congestion, rhinorrhea or sore throat. EYES:  Negative for visual disturbance. RESPIRATORY:  Positive for nonproductive cough. Negative for shortness of breath or wheezes. CARDIOVASCULAR:  Negative for chest pain or palpitations. GASTROINTESTINAL:  Negative for nausea, vomiting, abdominal pain, diarrhea or constipation. GENITOURINARY:  Negative for hematuria, dysuria. MUSCULOSKELETAL:  Negative for arthralgias or joint pain. CENTRAL NERVOUS SYSTEM:  Positive for near syncope. PSYCHIATRIC:  Negative for confusion, hallucinations or delusions. PHYSICAL EXAMINATION: 
GENERAL:  Patient was awake, alert, oriented to time, place, person. VITAL SIGNS:  Blood pressure 134/67, heart rate 87, respiratory rate 16, afebrile, saturating 96% on room air. HEAD:  Normocephalic. EYES:  Pupils equal and reactive to light. ENT:  Ears, nose, throat clear. NECK:  No jugular venous distention or carotid bruits. HEART:  S1, S2 present. RESPIRATORY:  Lungs with decreased air entry at the bases without any crepitations or rhonchi. GASTROINTESTINAL:  Bowel sounds present. The abdomen is soft, nontender, no rebound, no guarding. GENITOURINARY:  Nil of note. MUSCULOSKELETAL:  No asymmetry of the extremities, no significant pedal edema. SKIN:  No rashes. CENTRAL NERVOUS SYSTEM:  The patient was awake, alert, oriented to time, place, person. LABORATORY DATA AND RADIOGRAPHIC FINDINGS:  A CBC with a WBC of 12.1, 78% polys, hemoglobin 8.7, hematocrit 30.2 with a platelet count of 751. Metabolic panel with a sodium of 137, potassium 3.7, chloride of 105, bicarbonate of 22, a BUN of 29, creatinine 1.36, glucose of 150, calcium 9, albumin 3.7. Normal liver function tests. Troponin less than 0.05. Digoxin level of 0.9. Chest x-ray that showed bilateral lung infiltrates and pleural effusion superimposed on diffuse interstitial prominence. Urinalysis  pending. ASSESSMENT AND PLAN: 
1. Cardiovascular: The patient will be admitted to the inpatient level telemetry floor for further evaluation of near syncope:  Probable vasovagal event in view of patient's dehydration and probable bilateral community-acquired pneumonia, history of primary hypertension, chronic diastolic congestive heart failure with most recent ejection fraction of 55% to 60% from 03/2015. We will aim to optimize blood pressure control. We will continue gentle diuresis, follow up proBNP level and obtain a current 2D ECHO. As needed Cardiology consult. 2.  Respiratory:  Bilateral probable bacterial community-acquired pneumonia without any acute respiratory failure: We will initiate oxygen via nasal cannula with a goal saturation of at least 94%, nebulizers, inhalers and encourage incentive spirometry. 3.  Infectious Disease, probable bilateral bacterial community-acquired pneumonia without any definite sepsis, a leukocytosis with a left shift: We will check sputum for Gram stain and culture, blood cultures, sedimentation rate and initiate empiric ceftriaxone and Zithromax, followup chest x-ray and as needed, Pulmonary consult. 4.  Endocrine:  Diet-controlled diabetes mellitus. 5.  Renal:  Probable dehydration: We will initiate gentle intravenous fluids and follow up BUN and creatinine. 6.  Oncology:  History of left breast cancer treated with lumpectomy and in remission. 7.  Prophylaxis for deep vein thrombosis. 8.  Directives:  DDNR on record. Discussed with the patient and family. In summary,  70-year-old female with a few medical problems including chronic diastolic congestive heart failure, primary hypertension, probable paroxysmal atrial fibrillation and not on any chronic anticoagulation due to history of GI bleed, left breast cancer treated with lumpectomy is being admitted to the inpatient level telemetry floor for management of near syncope with probable vasovagal event and bilateral probable bacterial community-acquired pneumonia without any respiratory failure or sepsis. The patient is at increased risk for further decompensation due to the advanced age and comorbidities and will benefit from inpatient management including with IV hydration, IV antibiotics, respiratory therapy and possible Cardiology and Pulmonary consults. The entire admission plan  discussed in detail with the patient and patient's daughter, Narcisa West, who was at the bedside and can be reached at cell [phone]. All questions and concerns were addressed. Total time for admission was 50 minutes of which at least 50% of the time was spent in plan of care and counseling of the patient. MD TAM Long/ 
D: 12/29/2018 15:01    
T: 12/29/2018 16:48 JOB #: [id number]

## 2018-12-29 NOTE — ED PROVIDER NOTES
80 y.o. female with past medical history significant for HTN, DM, asthma, left breast cancer, who presents via EMS to the ED with cc of syncope. Pt states she was evaluated by her PCP yesterday for fatigue described as \"I feel exhausted\" x 3-4 days. Today, she reports GLF in her bathroom due to fatigue. She states \"I felt exhausted and went down slow so that I wouldn't break any bones. \" She is unsure whether she had any LOC during the event. Pt notes chronic mild cough that recently worsened in \"the past few days. \" Pt denies history of syncope. She denies any recent medication changes. She endorses history of chronic atrial fibrillation, but denies any use of anticoagulation, noting she has had GI bleed in the past. Pt specifically denies any palpitations, chest pain, pain anywhere, fevers, vomiting, recent illness, or shortness of breath. There are no other acute medical concerns at this time. Social Hx: Never Tobacco use, denies EtOH use PCP: Rachelle Archuleta MD 
 
Note written by Chadwick Galloway, as dictated by Christiano Rivera MD 11:58 AM 
 
 
The history is provided by the patient. No  was used. Past Medical History:  
Diagnosis Date  Asthma  Cancer [facility]) 2011 LEFT breast   
 Diabetes ([facility] [address]  Hypertension Past Surgical History:  
Procedure Laterality Date  HX BREAST LUMPECTOMY  2011 LEFT breast   
 HX LAP CHOLECYSTECTOMY Family History:  
Problem Relation Age of Onset  Cancer Mother ? TYPE CA  
 Diabetes Father  Heart Disease Father  Hypertension Father  Lung Disease Paternal Grandfather  Asthma Neg Hx  Malignant Hyperthermia Neg Hx  Pseudocholinesterase Deficiency Neg Hx  Delayed Awakening Neg Hx  Post-op Nausea/Vomiting Neg Hx Social History Socioeconomic History  Marital status:  Spouse name: Not on file  Number of children: Not on file  Years of education: Not on file  Highest education level: Not on file Social Needs  Financial resource strain: Not on file  Food insecurity - worry: Not on file  Food insecurity - inability: Not on file  Transportation needs - medical: Not on file  Transportation needs - non-medical: Not on file Occupational History  Not on file Tobacco Use  Smoking status: Never Smoker  Smokeless tobacco: Never Used Substance and Sexual Activity  Alcohol use: No  
 Drug use: Not on file  Sexual activity: Not on file Other Topics Concern  Not on file Social History Narrative  Not on file ALLERGIES: Beta-blockers (beta-adrenergic blocking agts); Lorazepam; and Adhesive Review of Systems Constitutional: Positive for fatigue. Negative for fever. HENT: Negative for congestion, rhinorrhea and sore throat. Eyes: Negative for visual disturbance. Respiratory: Positive for cough (chronic, slightly worse). Negative for shortness of breath and wheezing. Cardiovascular: Negative for chest pain and leg swelling. Gastrointestinal: Negative for abdominal pain, diarrhea, nausea and vomiting. Genitourinary: Negative for dysuria. Musculoskeletal: Negative. Negative for arthralgias, back pain, myalgias, neck pain and neck stiffness. Skin: Negative for rash. Neurological: Negative for headaches. + possible syncope, pt unsure if LOC Psychiatric/Behavioral: Negative for confusion. All other systems reviewed and are negative. Vitals:  
 12/29/18 1149 12/29/18 1210 BP: 98/58 134/67 Pulse: 86 87 Resp: 16 Temp: 97.9 °F (36.6 °C) SpO2: 96% Weight: 45.4 kg (100 lb) Height: 5' 1\" (1.549 m) Physical Exam  
Constitutional: She appears well-developed and well-nourished. No distress. HENT:  
Head: Normocephalic. Eyes: Pupils are equal, round, and reactive to light. Neck: Normal range of motion. JVD present. Cardiovascular: Normal rate. An irregular rhythm present. No murmur heard. Pulmonary/Chest: Effort normal and breath sounds normal.  
Abdominal: Soft. There is no tenderness. Musculoskeletal: Normal range of motion. Neurological: She is alert. Skin: Skin is warm and dry. Capillary refill takes less than 2 seconds. Psychiatric: She has a normal mood and affect. Her behavior is normal.  
  
Note written by Chadwick Mustafa, as dictated by Génesis Vyas MD 11:58 AM 
 
MDM Procedures ED EKG interpretation: 
Afib, rate 80, STD in lateral leads and mild SONIDO in anterior leads, unchanged from previous tracing 1/6/2018. Note written by Chadwick Mustafa, as dictated by Génesis Vyas MD 11:55 AM 
 
1:24 PM 
Pt's family here now, states pt developed more GARRIDO. Pt says she has a mild chronic cough but it increased in past few days and \"feels different. \" CXR shows what could be PNA in R base. Will ask hospitalist to admit. CONSULT NOTE: 
1:24 PM Génesis Vyas MD communicated with Dr. Mechelle Johnson, Consult for Hospitalist via [facility] Text. Discussed available diagnostic tests and clinical findings. 1:37 PM agrees to admit pt.

## 2018-12-30 NOTE — PROGRESS NOTES
Spoke with CHAR Allan, NP concerning patients c/o chest pain in the center of chest. Patient unable to describe pain when questioned, only stating discomfort. EKG obtained and immediately afterwards patient stating that she is no longer having chest pain. Will continue to monitor patient and obtain troponin with AM labs as previously ordered.

## 2018-12-30 NOTE — PROGRESS NOTES
Paged Hospitalist again and received return call. Spoke with CHAR Allan NP concerning patients new troponin result of 0.67. When asked, patient denies having any chest pain or shortness of breath. Patients current rhythm on the cardiac monitor is afib. No orders received. Will have cardiology consult called after shift change per unit standard.

## 2018-12-30 NOTE — PROGRESS NOTES
Spoke with Neftali Anand MD for cardiology consult. MD states that he will follow up with patient today.

## 2018-12-30 NOTE — PROGRESS NOTES
Bedside shift change report given to Norm Morales (oncoming nurse) by Anastasiia Jones (offgoing nurse). Report included the following information SBAR, Kardex and Recent Results.

## 2018-12-30 NOTE — PROGRESS NOTES
Hospitalist Progress Note Kelton Elizalde NP Answering service: [phone] OR [phone] from in house phone Cell: [phone] Date of Service:  2018 NAME:  Rosemarie De Leon :  1922 MRN:  [id number] Admission Summary: Pt presented to the ED from home for evaluation of a 3-4 day hx of generalized weakness, fatigue and an episode of near passing out on the morning of the emergency room presentation. The patient was in the bathroom at home on the morning of the emergency room presentation, felt really exhausted and slipped down onto the floor without hitting her head. Pmhx: chronic congestive heart failure, primary hypertension, diet-controlled diabetes mellitus, asthma, left breast cancer and neuropathy Interval history / Subjective:  
Pt in bed, family at bedside. Pt says she feels \"awful\" but unable to provide more explanation why. Just generalized poorly. Feels very anxious and did not sleep last night. Pt has had ativan before but it made her hallucinate and altered. Willing to try different anxiolytic at a very low dose. Ordered her to receive xanax 0.25 mg daily. Assessment & Plan: Anxiety: 
- pt and family reporting anxiety and desire to have some medication on board to help provide some relief 
- ativan caused hallucinations and AMS 
- willing to try very low dose xanax - aware it is similar type of medication as ativan 
- start tylenol pm as she usually takes this at home for sleep (unable to sleep last pm) Near syncope:   
- Probable vasovagal event 
- no further episodes but pt is not getting up much Bilateral probable bacterial community-acquired pneumonia:  
- no acute respiratory failure and no sepsis - Chest XRay: Bilateral lung infiltrates and pleural effusions, superimposed on diffuse 
interstitial prominence.  Correlate with clinical symptoms for congestive heart failure,  inflammatory infiltrates in the lung bases cannot be excluded. - oxygen via nasal cannula for comfort, wean as able 
- encourage incentive spirometry. - sputum for Gram stain and culture ordered but pt with no productive cough 
- no blood cultures but urine culture pending 
- continue with ceftriaxone and Zithromax 
- gentle diuresis - + leukocytosis but no fever Elevated Troponins - troponins <0.05/0.40/0.67 
- cardiology consulted this am, has been seen and elevations in troponin may be due to pneumonia. No further work up recommended and cardiology has signed off. Chronic Atrial Fibrillation:  
- on no OAC PTA 
- rate elevated at present, likely due to anxiety which has progress per nursing this am, HR 90's earlier today and now 100-120. 
- continue with digoxin and cardizem. Allergy noted to BB Diet-controlled diabetes mellitus:   
- blood sugar elevated on BMP, will order BID accuchecks for now 
- added carbohydrate controlled diet Fatigue:  
- pt with hx of anemia and GI bleeding. Pt on PPI and Hgb stable (recheck in am) - check iron panel 
- check B12 
- check vitamin D level Hx of left breast cancer: treated with lumpectomy and in remission. Hx Chronic Diastolic Heart Failure:   
- proBNP 3824 
- pt on cardizem, digoxin, lisinopril 
- most recent EF of 55-60% from 03/2015, Echo has been ordered 
- gentle diuresis at present, pt with no c/o SOB at present. - exam: No crackles and no edema. Code status: DNR 
DVT prophylaxis: Heparin Care Plan discussed with: patient, nurse, attending MD and family Disposition: Home when able Hospital Problems  Date Reviewed: 12/29/2018 Codes Class Noted POA Syncope ICD-10-CM: R55 
ICD-9-CM: 780.2  12/29/2018 Unknown Review of Systems:  
Did not sleep last pm. Denies HA. No chest pain or pressure. No respiratory complaints. No GI complaints. + anxiety Vital Signs: Last 24hrs VS reviewed since prior progress note. Most recent are: 
Visit Vitals /78 (BP 1 Location: Left arm, BP Patient Position: At rest) Pulse 97 Temp 98 °F (36.7 °C) Resp 17 Ht 5' 1\" (1.549 m) Wt 43.1 kg (95 lb 0.3 oz) SpO2 97% BMI 17.95 kg/m² Intake/Output Summary (Last 24 hours) at 12/30/2018 1056 Last data filed at 12/30/2018 8389 Gross per 24 hour Intake 300 ml Output 900 ml Net -600 ml Physical Examination:  
     
Constitutional:  No acute distress, cooperative, pleasant   
ENT:  Oral MM dry, oropharynx benign. Resp:  CTA bilaterally. No wheezing/rhonchi/rales. On 3L NC, sats 94% CV:  Irregular rhythm, afib on tele. -120 GI:  Soft, non distended, non tender. Normoactive bowel sounds Musculoskeletal:  No edema, warm, 2+ pulses throughout Neurologic:  Moves all extremities. AAOx3, CN II-XII reviewed Psych: Anxious, constanty moving around in bed. A/O x 3. Data Review:  
Review and/or order of clinical lab test 
Review and/or order of tests in the radiology section of CPT Review and/or order of tests in the medicine section of CPT Labs:  
 
Recent Labs 12/30/18 
0436 12/29/18 
1154 WBC 15.0* 12.1* HGB 9.2* 8.7* HCT 31.7* 30.2*  317 Recent Labs  
  12/29/18 
2227 12/29/18 
1154 NA  --  137 K  --  3.7 CL  --  105 CO2  --  22 BUN  --  29* CREA  --  1.36* GLU  --  158* CA  --  9.0 MG 2.0  --   
 
Recent Labs  
  12/29/18 
1154 SGOT 28 ALT 20  TBILI 0.8 TP 7.8 ALB 3.7 GLOB 4.1* No results for input(s): INR, PTP, APTT in the last 72 hours. No lab exists for component: INREXT No results for input(s): FE, TIBC, PSAT, FERR in the last 72 hours. Lab Results Component Value Date/Time Folate 29.3 (H) 03/31/2015 11:56 AM  
  
No results for input(s): PH, PCO2, PO2 in the last 72 hours. Recent Labs 12/30/18 
0436 12/29/18 
2227 12/29/18 
1154 TROIQ 0.67* 0.40* <0.05  
 Lab Results Component Value Date/Time Cholesterol, total 136 03/31/2015 03:23 AM  
 HDL Cholesterol 38 03/31/2015 03:23 AM  
 LDL, calculated 78.8 03/31/2015 03:23 AM  
 Triglyceride 96 03/31/2015 03:23 AM  
 CHOL/HDL Ratio 3.6 03/31/2015 03:23 AM  
 
Lab Results Component Value Date/Time Glucose (POC) 146 (H) 04/28/2015 12:01 PM  
 Glucose (POC) 123 (H) 04/28/2015 07:08 AM  
 Glucose (POC) 161 (H) 04/27/2015 09:14 PM  
 Glucose (POC) 140 (H) 04/27/2015 05:21 PM  
 Glucose (POC) 86 04/01/2015 06:12 AM  
 
Lab Results Component Value Date/Time Color YELLOW/STRAW 12/29/2018 05:25 PM  
 Appearance CLEAR 12/29/2018 05:25 PM  
 Specific gravity 1.021 12/29/2018 05:25 PM  
 pH (UA) 5.5 12/29/2018 05:25 PM  
 Protein 30 (A) 12/29/2018 05:25 PM  
 Glucose 100 (A) 12/29/2018 05:25 PM  
 Ketone NEGATIVE  12/29/2018 05:25 PM  
 Bilirubin NEGATIVE  12/29/2018 05:25 PM  
 Urobilinogen 1.0 12/29/2018 05:25 PM  
 Nitrites NEGATIVE  12/29/2018 05:25 PM  
 Leukocyte Esterase MODERATE (A) 12/29/2018 05:25 PM  
 Epithelial cells FEW 12/29/2018 05:25 PM  
 Bacteria NEGATIVE  12/29/2018 05:25 PM  
 WBC 20-50 12/29/2018 05:25 PM  
 RBC 0-5 12/29/2018 05:25 PM  
 
Medications Reviewed:  
 
Current Facility-Administered Medications Medication Dose Route Frequency  sodium chloride (NS) flush 5-10 mL  5-10 mL IntraVENous Q8H  
 sodium chloride (NS) flush 5-10 mL  5-10 mL IntraVENous PRN  
 acetaminophen (TYLENOL) tablet 650 mg  650 mg Oral Q4H PRN  
 magnesium hydroxide (MILK OF MAGNESIA) 400 mg/5 mL oral suspension 30 mL  30 mL Oral DAILY PRN  
 heparin (porcine) injection 5,000 Units  5,000 Units SubCUTAneous Q12H  cefTRIAXone (ROCEPHIN) 1 g in 0.9% sodium chloride (MBP/ADV) 50 mL  1 g IntraVENous Q24H  
 azithromycin (ZITHROMAX) 500 mg in 0.9% sodium chloride 250 mL (Npga1Fmh)  500 mg IntraVENous Q24H  
 albuterol (PROVENTIL VENTOLIN) nebulizer solution 1.25 mg  1.25 mg Nebulization Q6H PRN  
  digoxin (LANOXIN) tablet 0.125 mg  0.125 mg Oral EVERY OTHER DAY  dilTIAZem CD (CARDIZEM CD) capsule 180 mg  180 mg Oral DAILY  gabapentin (NEURONTIN) capsule 300 mg  300 mg Oral 7am  
 gabapentin (NEURONTIN) capsule 600 mg  600 mg Oral QPM  
 lisinopril (PRINIVIL, ZESTRIL) tablet 10 mg  10 mg Oral DAILY  montelukast (SINGULAIR) tablet 10 mg  10 mg Oral QPM  
 pantoprazole (PROTONIX) tablet 40 mg  40 mg Oral DAILY  furosemide (LASIX) injection 20 mg  20 mg IntraVENous Q12H  
______________________________________________________________________ EXPECTED LENGTH OF STAY: - - - 
ACTUAL LENGTH OF STAY:          1 Leah Ackerman NP

## 2018-12-30 NOTE — PROGRESS NOTES
A Spiritual Care Partner Volunteer visited patient in Rm 439 on 12/30/2018. Documented by: 
Chaplain Serna MDiv, MS, [facility] 
[phone])

## 2018-12-30 NOTE — PROGRESS NOTES
Problem: Falls - Risk of 
Goal: *Absence of Falls Document Ramakrishna Westbrook Fall Risk and appropriate interventions in the flowsheet. Outcome: Progressing Towards Goal 
Fall Risk Interventions: 
Mobility Interventions: Patient to call before getting OOB Medication Interventions: Patient to call before getting OOB Elimination Interventions: Call light in reach History of Falls Interventions: Room close to nurse's station

## 2018-12-30 NOTE — PROGRESS NOTES
Received phone call from CHAR Allan NP to go ahead and call the cardiology consult now and to call [place]'s cardiology group.

## 2018-12-30 NOTE — PROGRESS NOTES
Bedside shift change report given to Regulo moeller (oncoming nurse) by Marya Reyes (offgoing nurse). Report included the following information SBAR, Kardex, ED Summary, Intake/Output, MAR, Recent Results and Cardiac Rhythm chronic afib.

## 2018-12-30 NOTE — PROGRESS NOTES
Have not received call back from [facility], MD with cardiology. Patient is now stating that her cardiologist is Santiago Ceja. Paged and spoke with Chandana Gipson, who states that Misha Luz MD is with the same cardiology group as [facility]. Notified that [facility] was paged due to elevated troponins, but has not yet returned phone call. Vernon Romero states that there is no need to page consult again, as cardiology will come see patient.

## 2018-12-30 NOTE — PROGRESS NOTES
Problem: Falls - Risk of 
Goal: *Absence of Falls Document Harshad Dorman Fall Risk and appropriate interventions in the flowsheet. Outcome: Progressing Towards Goal 
Fall Risk Interventions: 
Mobility Interventions: Patient to call before getting OOB Medication Interventions: Patient to call before getting OOB Elimination Interventions: Call light in reach History of Falls Interventions: Bed/chair exit alarm, Door open when patient unattended, Room close to nurse's station Problem: Pressure Injury - Risk of 
Goal: *Prevention of pressure injury Document Nithin Scale and appropriate interventions in the flowsheet. Outcome: Progressing Towards Goal 
Pressure Injury Interventions: 
Sensory Interventions: Assess changes in LOC, Keep linens dry and wrinkle-free, Minimize linen layers Moisture Interventions: Minimize layers Activity Interventions: Pressure redistribution bed/mattress(bed type), Increase time out of bed Mobility Interventions: Float heels Nutrition Interventions: Document food/fluid/supplement intake Friction and Shear Interventions: Lift sheet

## 2018-12-30 NOTE — CONSULTS
S Cardiology Consult Note Date of consult:  12/30/18 Date of admission: 12/29/2018 Primary Cardiologist: Dr Bull Baugh Physician Requesting consult: Dr Akin Caldera CC / Reason for consult: Abnormal troponin History of the presenting illness: 
Dante Cox is a 80 y.o. who presented to the ER with a syncopal episode. She is not able to give much detail regarding her main complaint. Review of the chart shows she was feeling exhausted x several days and had a near syncopal episode. She isn't sure of the details. Feels exhausted now but has no other specific symptoms. She denies chest pain, shortness of breath, cough or subjective fever. Past Medical History:  
Diagnosis Date  Asthma  Cancer [facility]) 2011 LEFT breast   
 Diabetes ([facility] [address]  Hypertension Prior to Admission medications Medication Sig Start Date End Date Taking? Authorizing Provider  
pantoprazole (PROTONIX) 40 mg tablet Take 40 mg by mouth daily. before breakfast and before dinner 1/19/18  Yes Willem Garcia MD  
gabapentin (NEURONTIN) 300 mg capsule Take 600 mg by mouth every evening. Yes Provider, Historical  
furosemide (LASIX) 20 mg tablet Take 20 mg by mouth daily as needed. Yes Provider, Historical  
dilTIAZem CD (CARTIA XT) 180 mg ER capsule Take 180 mg by mouth daily. Yes Provider, Historical  
lisinopril (PRINIVIL, ZESTRIL) 10 mg tablet Take 10 mg by mouth daily. Yes Other, MD Yvon  
digoxin (LANOXIN) 0.125 mg tablet Take 1 Tab by mouth every other day. 4/3/15  Yes Kelly Lowe MD  
gabapentin (NEURONTIN) 300 mg capsule Take 300 mg by mouth every morning. Yes Provider, Historical  
montelukast (SINGULAIR) 10 mg tablet Take 10 mg by mouth every evening. Yes Provider, Historical  
 
 
Family History Problem Relation Age of Onset  Cancer Mother ? TYPE CA  
 Diabetes Father  Heart Disease Father  Hypertension Father  Lung Disease Paternal Grandfather  Asthma Neg Hx  Malignant Hyperthermia Neg Hx  Pseudocholinesterase Deficiency Neg Hx  Delayed Awakening Neg Hx  Post-op Nausea/Vomiting Neg Hx Social History Socioeconomic History  Marital status:  Spouse name: Not on file  Number of children: Not on file  Years of education: Not on file  Highest education level: Not on file Social Needs  Financial resource strain: Not on file  Food insecurity - worry: Not on file  Food insecurity - inability: Not on file  Transportation needs - medical: Not on file  Transportation needs - non-medical: Not on file Occupational History  Not on file Tobacco Use  Smoking status: Never Smoker  Smokeless tobacco: Never Used Substance and Sexual Activity  Alcohol use: No  
 Drug use: Not on file  Sexual activity: Not on file Other Topics Concern  Not on file Social History Narrative  Not on file Review of Systems Constitutional: Negative for chills and fever. HENT: Negative for hearing loss and nosebleeds. Eyes: Negative for blurred vision and double vision. Respiratory: Negative for cough and sputum production. Cardiovascular: Negative for chest pain. Gastrointestinal: Negative for abdominal pain, nausea and vomiting. Genitourinary: Negative for frequency and urgency. Musculoskeletal: Negative for back pain and joint pain. Skin: Negative for itching and rash. Neurological: Negative for dizziness, loss of consciousness and headaches. Endo/Heme/Allergies: Negative for environmental allergies. Does not bruise/bleed easily. Visit Vitals /78 (BP 1 Location: Left arm, BP Patient Position: At rest) Pulse 97 Temp 98 °F (36.7 °C) Resp 17 Ht 5' 1\" (1.549 m) Wt 43.1 kg (95 lb 0.3 oz) SpO2 97% BMI 17.95 kg/m² Physical Exam  
Constitutional: She is oriented to person, place, and time and well-developed, well-nourished, and in no distress. Frail appearing elderly female in no acute distress. HENT:  
Head: Normocephalic and atraumatic. Eyes: Conjunctivae are normal. No scleral icterus. Neck: No JVD present. Cardiovascular: Normal rate, regular rhythm and normal heart sounds. Exam reveals no gallop. No murmur heard. Pulmonary/Chest: Effort normal and breath sounds normal. No stridor. No respiratory distress. She has no wheezes. Abdominal: Soft. She exhibits no distension. Musculoskeletal: Normal range of motion. She exhibits no deformity. Neurological: She is alert and oriented to person, place, and time. Skin: Skin is warm and dry. Psychiatric: Mood and affect normal.  
 
 
Lab review: 
BMP:  
Lab Results Component Value Date/Time  12/29/2018 11:54 AM  
 K 3.7 12/29/2018 11:54 AM  
  12/29/2018 11:54 AM  
 CO2 22 12/29/2018 11:54 AM  
 AGAP 10 12/29/2018 11:54 AM  
  (H) 12/29/2018 11:54 AM  
 BUN 29 (H) 12/29/2018 11:54 AM  
 CREA 1.36 (H) 12/29/2018 11:54 AM  
 GFRAA 44 (L) 12/29/2018 11:54 AM  
 GFRNA 36 (L) 12/29/2018 11:54 AM  
  
 
CBC: 
Lab Results Component Value Date/Time WBC 15.0 (H) 12/30/2018 04:36 AM  
 Hemoglobin (POC) 10.9 (L) 10/04/2013 11:25 AM  
 HGB 9.2 (L) 12/30/2018 04:36 AM  
 Hematocrit (POC) 32 (L) 10/04/2013 11:25 AM  
 HCT 31.7 (L) 12/30/2018 04:36 AM  
 PLATELET 492 02/19/0592 04:36 AM  
 MCV 89.8 12/30/2018 04:36 AM  
 
 
All Cardiac Markers in the last 24 hours:   
Lab Results Component Value Date/Time TROIQ 0.67 (H) 12/30/2018 04:36 AM  
 TROIQ 0.40 (H) 12/29/2018 10:27 PM  
 TROIQ <0.05 12/29/2018 11:54 AM  
 BNPNT 3,824 (H) 12/29/2018 11:54 AM  
 
 
Data review: 
EKG tracing personally reviewed: Atrial fibrillation - rate 80 bpm, lateral ST-TW changes (no change compared w EKG from Jan 2018)/ 
 
Echocardiogram: pending CXR: IMPRESSION: 
Bilateral lung infiltrates and pleural effusions, superimposed on diffuse interstitial prominence. . Correlate with clinical symptoms for congestive heart 
failure, although inflammatory infiltrates in the lung bases cannot be 
excluded. Claudio Daily Assessment: 1. Near syncope 2. Pneumonia 3. Troponin elevation: non-MI troponin elevation likely secondary to pneumonia 4. HTN 
5. Type II diabetes w diabetic CKD 6. CKD stage III Recommendations / Plan: Do not check any more troponins No ischemic work-up needed Supportive cares and Abx for pneumonia Continue OP cardiac meds for chronic a.fib rate control Signing off - please call if any acute cardiac issues arise. Signed: 
John Disla Interventional Cardiology 12/30/18

## 2018-12-31 NOTE — PROGRESS NOTES
0800: Received SBAR report from Nawaf Choudhary [place]. Assumed Care. 1851: TRANSFER - OUT REPORT: 
 
Verbal report given to Luisana Subramanian RN(name) on Shawn Rodriguez  being transferred to (unit) for routine progression of care Report consisted of patients Situation, Background, Assessment and  
Recommendations(SBAR). Information from the following report(s) SBAR, MAR and Recent Results was reviewed with the receiving nurse. Lines:  
Peripheral IV 12/29/18 Right Wrist (Active) Site Assessment Clean, dry, & intact 12/31/2018  3:32 PM  
Phlebitis Assessment 0 12/31/2018  3:32 PM  
Infiltration Assessment 0 12/31/2018  3:32 PM  
Dressing Status Clean, dry, & intact 12/31/2018  3:32 PM  
Dressing Type Transparent 12/31/2018  3:32 PM  
Hub Color/Line Status Pink;Capped 12/31/2018  3:32 PM  
Action Taken Open ports on tubing capped 12/31/2018  9:00 AM  
Alcohol Cap Used Yes 12/31/2018  3:32 PM  
  
 
Opportunity for questions and clarification was provided. Patient transported with: 
 Monitor

## 2018-12-31 NOTE — PROGRESS NOTES
Reason for Admission:   Syncope, PNA RRAT Score:     18 Do you (patient/family) have any concerns for transition/discharge? Patient lives alone; family working to develop a plan for a care aid Plan for utilizing home health:    [address] [place] [address] -  to submit referral to [facility] via [facility] Likelihood of readmission? Moderate Transition of Care Plan:      Pharmacy preference identified as [address] Dr. Kade Grigsby is patient's cardio. Patient lives in an independent living cottage at [facility]. Patient has no hx of IPR or SNF but has hx of HH with [facility]. Patient's plan includes: pulmonary and cardio consults, Echo, and diuresis. Care Management Interventions PCP Verified by CM: Yes(followed by Dr. Vee Shaikh) Last Visit to PCP: 12/28/18 Palliative Care Criteria Met (RRAT>21 & CHF Dx)?: No 
Mode of Transport at Discharge: Other (see comment)(daughter to transport patient) Transition of Care Consult (CM Consult): Home Health, Discharge Planning(H2H visit) [address]: Yes MyChart Signup: No 
Discharge Durable Medical Equipment: No(has dme of: rollator and shower stool) Health Maintenance Reviewed: Yes(CM met with patient, with daughter at bedside, with patient alert and oriented x 4) Physical Therapy Consult: Yes Occupational Therapy Consult: Yes Speech Therapy Consult: No 
Current Support Network: Own Home, Lives Alone(lives in a [address] [facility]) Confirm Follow Up Transport: Family(independent in adls to include living alone; daughter transports patient) Plan discussed with Pt/Family/Caregiver: Yes(daughter at bedside) Freedom of Choice Offered: Yes(in agreement to [address] [place] [address] visit) [employer] Information Provided?: No 
Discharge Location Discharge Placement: Home with home health(alone in a single story home, no exterior steps) CRM: Divya Barry, MPH, [address] [place] [address] [facility]; Z: [phone]

## 2018-12-31 NOTE — CDMP QUERY
Good Afternoon~ The medical record on 12/31 notes, \"Now systolic Heart failure\". In order to provide the severity and acuity of the dx, can you please further clarify the acuity as; 
 
=> Acute systolic (congestive) heart failure as evidenced by pt with ef @ 40-45% with severe hypokinesis, Elevated BNP, pt was placed on cardizem, digoxin and lisinopril,  will probably  need to be on gentle diuretic  at home.  
 
 
=> Acute Systolic  Diastolic Heart Failure as evidenced by pt with ef @ 40-45% with severe hypokinesis, Elevated BNP, pt was placed on cardizem, digoxin and lisinopril, will probably need to be on gentle diuretic at home, Echo, Cards consulted.   
 
=> Other explanation of clinical findings 
=> Clinically Undetermined (no explanation for clinical findings) The medical record reflects the following clinical findings, treatment, and risk factors. Risk Factors:  Elderly 80 yr old pt with known Chronic Diastolic Heart Failure, Atrial fib. Clinical Indicators:  Echo now @ 40-45%, last known echo was 55-60%---Elevated BNP this admission Treatment: pt was placed on cardizem, digoxin and lisinopril, will probably need to be on gentle diuretic at home, Echo, Cards consulted. Please clarify and document your clinical opinion in the progress notes and discharge summary including the definitive and/or presumptive diagnosis, (suspected or probable), related to the above clinical findings. Please include clinical findings supporting your diagnosis. Thank you, HOWIE RamirezN, RN, [facility], [facility]

## 2018-12-31 NOTE — PROGRESS NOTES
Hospitalist Progress Note Best Jacobs NP Answering service: [phone] OR [phone] from in house phone Cell: [phone] Date of Service:  2018 NAME:  Lucero Shen :  1922 MRN:  [id number] Admission Summary: Pt presented to the ED from home for evaluation of a 3-4 day hx of generalized weakness, fatigue and an episode of near passing out on the morning of the emergency room presentation. The patient was in the bathroom at home on the morning of the emergency room presentation, felt really exhausted and slipped down onto the floor without hitting her head. Pmhx: chronic congestive heart failure, primary hypertension, diet-controlled diabetes mellitus, asthma, left breast cancer and neuropathy Interval history / Subjective:  
Sleeping much better and in great spirits today. She feels rested and says, \"I feel so much better today and so glad I slept. \" No issues overnight and no untoward side effects of Xanax although she does not want to get in a habit she says of taking this. Discussed results of ECHO with her and function levels and she says, \"for Heaven's sake I'm 89, it's bound to be lower! \" She is not interested in any type of intervention and wants medical management only. Assessment & Plan: Anxiety: 
- pt and family reporting anxiety and desire to have some medication on board to help provide some relief 
- ativan caused hallucinations and AMS 
- willing to try very low dose xanax - tolerated this well on  
- continue tylenol pm as she usually takes this at home for sleep (helped her sleep!) Near syncope:   
- Probable vasovagal event 
- no further episodes but pt is not getting up much 
- will check orthostatics when up today Bilateral bacterial community-acquired pneumonia (POA):  
- no acute respiratory failure and no sepsis - Chest XRay: Bilateral lung infiltrates and pleural effusions, superimposed on diffuse 
interstitial prominence. Correlate with clinical symptoms for congestive heart failure,  inflammatory infiltrates in the lung bases cannot be excluded. - continue oxygen -> she is on home O2 at 3L 
- encourage incentive spirometry. - no blood cultures ordered on arrival 
- continue with ceftriaxone and Zithromax - + leukocytosis but no fever 
- needs to get up OOB Acute systolic and diastolic on chronic CHF (POA) - Suspect much of the SOB is related to PNA combined with CHF 
- ECHO with EF 40-45% and severe hypokinesis -> discussed with her and family 
- Continue Lisinopril. Unable to use BB due to allergy. GI bleed in the past with ASA - Sees Dr. Ruby Burgess with [facility] as an OP and family arranging a follow up appt with him next week - Would benefit from a slightly higher dose of Lasix at discharge at least temporarily to help with effusions/SOB. Elevated Troponins - troponins <0.05/0.40/0.67 
- cardiology consulted 12/30, has been seen and elevations in troponin may be due to pneumonia. No further work up recommended and cardiology has signed off. Chronic Atrial Fibrillation:  
- on no OAC PTA 
- continue with digoxin and cardizem. Allergy noted to BB 
- overall fairly controlled in last 24 hours Diet-controlled diabetes mellitus:   
- diabetic diet Fatigue:  
- pt with hx of anemia and GI bleeding. Pt on PPI and Hgb stable - check iron panel -> Iron 32, TIBC 376, Iron % 9 
- check vitamin D level - Level is 25.3 -> Insufficiency Hx of left breast cancer: treated with lumpectomy and in remission. Hx Chronic Diastolic & now Systolic Heart Failure:   
- proBNP 3824 
- pt on cardizem, digoxin, lisinopril 
- ECHO from 12/30 with EF 40-45% and severe hypokinesis -> results discussed with her - She probably should be on gentle diuretic at home - Cannot use BB due to allergy and no ASA due to GI bleed with this in the past 
 
Code status: DNR 
DVT prophylaxis: Heparin Care Plan discussed with: Patient, RN, Attending, Daughter on way in Disposition: Home possibly Tuesday? Get PT/OT eval. Lives in [place] at [facility] Problems  Date Reviewed: 12/29/2018 Codes Class Noted POA Syncope ICD-10-CM: R55 
ICD-9-CM: 780.2  12/29/2018 Unknown Review of Systems:  
Denied CP or SOB, not coughing very much, no fevers or chills, feels weak but better, feels rested Vital Signs:  
Last 24hrs VS reviewed since prior progress note. Most recent are: 
Visit Vitals /60 Pulse 76 Temp 97.4 °F (36.3 °C) Resp 18 Ht 5' 1\" (1.549 m) Wt 43.3 kg (95 lb 6.4 oz) SpO2 97% BMI 18.03 kg/m² Intake/Output Summary (Last 24 hours) at 12/31/2018 1041 Last data filed at 12/31/2018 7795 Gross per 24 hour Intake 420 ml Output 600 ml Net -180 ml Physical Examination:  
     
Constitutional:  No acute distress, cooperative, pleasant   
ENT:  Nml oropharynx Resp:  CTA bilaterally. No wheezing/rhonchi/rales. On 3L NC (home dose). No accessory muscle use CV:  Irregular rhythm, afib on tele. GI:  Soft, non distended, non tender. Normoactive bowel sounds Musculoskeletal:  No edema, warm, 2+ pulses throughout Neurologic:  Moves all extremities. AAOx3, No focal deficits and clear speech Psych: Calm and cooperative. Good insight Data Review:  
Review and/or order of clinical lab test 
Review and/or order of tests in the radiology section of CPT Review and/or order of tests in the medicine section of CPT Labs:  
 
Recent Labs 12/31/18 
1322 12/30/18 
4899 WBC 11.5* 15.0* HGB 9.0* 9.2* HCT 30.8* 31.7*  297 Recent Labs 12/31/18 
0313 12/29/18 
2227 12/29/18 
1154   --  137  
K 3.6  --  3.7   --  105 CO2 25  --  22 BUN 26*  --  29* CREA 1.23*  --  1.36* *  --  158* CA 8.8  --  9.0 MG  --  2.0  --   
 
Recent Labs  
  12/29/18 
1154 SGOT 28 ALT 20  TBILI 0.8 TP 7.8 ALB 3.7 GLOB 4.1* No results for input(s): INR, PTP, APTT in the last 72 hours. No lab exists for component: INREXT, INREXT Recent Labs 12/31/18 
1298 TIBC 376 PSAT 9* Lab Results Component Value Date/Time Folate 29.3 (H) 03/31/2015 11:56 AM  
  
No results for input(s): PH, PCO2, PO2 in the last 72 hours. Recent Labs 12/30/18 
0436 12/29/18 
2227 12/29/18 
1154 TROIQ 0.67* 0.40* <0.05 Lab Results Component Value Date/Time Cholesterol, total 136 03/31/2015 03:23 AM  
 HDL Cholesterol 38 03/31/2015 03:23 AM  
 LDL, calculated 78.8 03/31/2015 03:23 AM  
 Triglyceride 96 03/31/2015 03:23 AM  
 CHOL/HDL Ratio 3.6 03/31/2015 03:23 AM  
 
Lab Results Component Value Date/Time Glucose (POC) 109 (H) 12/30/2018 04:16 PM  
 Glucose (POC) 146 (H) 04/28/2015 12:01 PM  
 Glucose (POC) 123 (H) 04/28/2015 07:08 AM  
 Glucose (POC) 161 (H) 04/27/2015 09:14 PM  
 Glucose (POC) 140 (H) 04/27/2015 05:21 PM  
 
Lab Results Component Value Date/Time Color YELLOW/STRAW 12/29/2018 05:25 PM  
 Appearance CLEAR 12/29/2018 05:25 PM  
 Specific gravity 1.021 12/29/2018 05:25 PM  
 pH (UA) 5.5 12/29/2018 05:25 PM  
 Protein 30 (A) 12/29/2018 05:25 PM  
 Glucose 100 (A) 12/29/2018 05:25 PM  
 Ketone NEGATIVE  12/29/2018 05:25 PM  
 Bilirubin NEGATIVE  12/29/2018 05:25 PM  
 Urobilinogen 1.0 12/29/2018 05:25 PM  
 Nitrites NEGATIVE  12/29/2018 05:25 PM  
 Leukocyte Esterase MODERATE (A) 12/29/2018 05:25 PM  
 Epithelial cells FEW 12/29/2018 05:25 PM  
 Bacteria NEGATIVE  12/29/2018 05:25 PM  
 WBC 20-50 12/29/2018 05:25 PM  
 RBC 0-5 12/29/2018 05:25 PM  
 
Medications Reviewed:  
 
Current Facility-Administered Medications Medication Dose Route Frequency  pantoprazole (PROTONIX) tablet 40 mg  40 mg Oral DAILY  acetaminophen/diphenhydrAMINE (TYLENOL PM EXT STR) 500/25 mg   Oral QHS PRN  
 sodium chloride (NS) flush 5-10 mL  5-10 mL IntraVENous Q8H  
 sodium chloride (NS) flush 5-10 mL  5-10 mL IntraVENous PRN  
 acetaminophen (TYLENOL) tablet 650 mg  650 mg Oral Q4H PRN  
 magnesium hydroxide (MILK OF MAGNESIA) 400 mg/5 mL oral suspension 30 mL  30 mL Oral DAILY PRN  
 heparin (porcine) injection 5,000 Units  5,000 Units SubCUTAneous Q12H  cefTRIAXone (ROCEPHIN) 1 g in 0.9% sodium chloride (MBP/ADV) 50 mL  1 g IntraVENous Q24H  
 azithromycin (ZITHROMAX) 500 mg in 0.9% sodium chloride 250 mL (Btef3Fmn)  500 mg IntraVENous Q24H  
 digoxin (LANOXIN) tablet 0.125 mg  0.125 mg Oral EVERY OTHER DAY  dilTIAZem CD (CARDIZEM CD) capsule 180 mg  180 mg Oral DAILY  gabapentin (NEURONTIN) capsule 300 mg  300 mg Oral 7am  
 gabapentin (NEURONTIN) capsule 600 mg  600 mg Oral QPM  
 lisinopril (PRINIVIL, ZESTRIL) tablet 10 mg  10 mg Oral DAILY  montelukast (SINGULAIR) tablet 10 mg  10 mg Oral QPM  
 furosemide (LASIX) injection 20 mg  20 mg IntraVENous Q12H  
______________________________________________________________________ EXPECTED LENGTH OF STAY: - - - 
ACTUAL LENGTH OF STAY:          2 Leif Deleon NP

## 2018-12-31 NOTE — PROGRESS NOTES
physical Therapy EVALUATION/DISCHARGE Patient: Yosef Pierce (12 y.o. female) Date: 12/31/2018 Primary Diagnosis: Syncope Precautions:     
ASSESSMENT : 
Based on the objective data described below, the patient presents with decreased overall endurance and balance but endorsed improvement since admission. Assessed her BP with movement with a 25 mmHg drop in systolic pressure from sitting to standing but she remained asymptomatic and recovered to her seated pressure post gait. Discussed taking time to accommodate before standing at home and ankle pumps to improve venous return. Gait training x100' using RW with SBA and good overall unruly. Cues required for erect posture and to push vs lift the walker. Maintained on 3L O2 throughout intervention and her SpO2 was 89% with  BPM at peak exertion and recovered in < 1 minute with rest. Overall, she is likely near but below her baseline for functional mobility. This patient has good overall safety awareness and agree with recommendations for discharge home with HHPT. Skilled physical therapy is not indicated at this time. PLAN : 
Discharge Recommendations: Home Health Further Equipment Recommendations for Discharge: None SUBJECTIVE:  
Patient stated I feel much better than yesterday.  OBJECTIVE DATA SUMMARY:  
HISTORY:   
Past Medical History:  
Diagnosis Date  Asthma  Cancer [facility]) 2011 LEFT breast   
 Diabetes ([facility] [address]  Hypertension Past Surgical History:  
Procedure Laterality Date  HX BREAST LUMPECTOMY  2011 LEFT breast   
 HX LAP CHOLECYSTECTOMY Prior Level of Function/Home Situation: modified independent with rollator Personal factors and/or comorbidities impacting plan of care: EXAMINATION/PRESENTATION/DECISION MAKING:  
Critical Behavior: 
Neurologic State: Alert Orientation Level: Oriented X4 Cognition: Follows commands Hearing: Auditory Auditory Impairment: Hearing aid(s) Hearing Aids/Status: Right, With patient Skin:   
Edema:  
Range Of Motion: 
AROM: Generally decreased, functional 
  
  
  
  
  
  
  
Strength:   
Strength: Generally decreased, functional 
  
  
  
  
  
  
Tone & Sensation:  
  
  
  
  
  
Sensation: Intact Coordination: 
Coordination: Generally decreased, functional 
Vision:  
  
Functional Mobility: 
Bed Mobility: 
  
  
  
Scooting: Supervision Transfers: 
Sit to Stand: Contact guard assistance Stand to Sit: Contact guard assistance Balance:  
Sitting: Intact Standing: Impaired Standing - Static: Good Standing - Dynamic : Fair Ambulation/Gait Training: 
Distance (ft): 100 Feet (ft) Assistive Device: Walker, rolling Ambulation - Level of Assistance: Stand-by assistance Gait Description (WDL): Exceptions to [facility] Gait Abnormalities: Decreased step clearance;Shuffling gait Base of Support: Narrowed Speed/Ilda: Fluctuations Functional Measure: 
Tinetti test: 
 
Sitting Balance: 1 Arises: 1 Attempts to Rise: 1 Immediate Standing Balance: 1 Standing Balance: 1 Nudged: 1 Eyes Closed: 1 Turn 360 Degrees - Continuous/Discontinuous: 1 Turn 360 Degrees - Steady/Unsteady: 1 Sitting Down: 1 Balance Score: 10 Indication of Gait: 1 
R Step Length/Height: 1 L Step Length/Height: 1 
R Foot Clearance: 1 L Foot Clearance: 1 Step Symmetry: 1 Step Continuity: 1 Path: 1 Trunk: 0 Walking Time: 0 Gait Score: 8 Total Score: 18 Tinetti Test and G-code impairment scale: 
Percentage of Impairment CH 
 
0% 
 CI 
 
1-19% CJ 
 
20-39% CK 
 
40-59% CL 
 
60-79% CM 
 
80-99% CN  
 
100% Tinetti Score 0-28 28 23-27 17-22 12-16 6-11 1-5 0 Tinetti Tool Score Risk of Falls 
<19 = High Fall Risk 19-24 = Moderate Fall Risk 25-28 = Low Fall Risk Tinetti ME.  Performance-Oriented Assessment of Mobility Problems in Elderly Patients. [facility] 66; [id number]. (Scoring Description: PT Bulletin Feb. 10, 1993) Older adults: Amaris Joy et al, 2009; n = [address] elderly evaluated with ABC, JABARI, ADL, and IADL) · Mean JABARI score for males aged 69-68 years = 26.21(3.40) · Mean JABARI score for females age 69-68 years = 25.16(4.30) · Mean JABARI score for males over 80 years = 23.29(6.02) · Mean JABARI score for females over 80 years = 17.20(8.32) G codes: In compliance with CMSs Claims Based Outcome Reporting, the following G-code set was chosen for this patient based on their primary functional limitation being treated: The outcome measure chosen to determine the severity of the functional limitation was the Tinetti with a score of 18/28 which was correlated with the impairment scale. ? Mobility - Walking and Moving Around:  
  - CURRENT STATUS: CJ - 20%-39% impaired, limited or restricted  - GOAL STATUS: CJ - 20%-39% impaired, limited or restricted  - D/C STATUS:  CJ - 20%-39% impaired, limited or restricted Physical Therapy Evaluation Charge Determination History Examination Presentation Decision-Making MEDIUM  Complexity : 1-2 comorbidities / personal factors will impact the outcome/ POC  LOW Complexity : 1-2 Standardized tests and measures addressing body structure, function, activity limitation and / or participation in recreation  LOW Complexity : Stable, uncomplicated  LOW Complexity : FOTO score of  Based on the above components, the patient evaluation is determined to be of the following complexity level: LOW Pain: 
Pain Scale 1: Numeric (0 - 10) Pain Intensity 1: 0 Activity Tolerance:  
 
Please refer to the flowsheet for vital signs taken during this treatment. After treatment:  
[x]   Patient left in no apparent distress sitting up in chair 
[]   Patient left in no apparent distress in bed 
[x]   Call bell left within reach [x]   Nursing notified [x]   Caregiver present [x]   Bed alarm activated COMMUNICATION/EDUCATION:  
Communication/Collaboration: 
[x]   Fall prevention education was provided and the patient/caregiver indicated understanding. [x]   Patient/family have participated as able and agree with findings and recommendations. []   Patient is unable to participate in plan of care at this time. Findings and recommendations were discussed with: Registered Nurse Thank you for this referral. 
Joey Turner, PT, DPT Time Calculation: 31 mins

## 2018-12-31 NOTE — PROGRESS NOTES
Bedside shift change report given to Gabbie (oncoming nurse) by Harrison Yang (offgoing nurse). Report included the following information SBAR, Kardex, Intake/Output, MAR, Recent Results and Cardiac Rhythm afib.

## 2019-01-01 ENCOUNTER — APPOINTMENT (OUTPATIENT)
Dept: GENERAL RADIOLOGY | Age: 84
DRG: 283 | End: 2019-01-01
Attending: EMERGENCY MEDICINE
Payer: MEDICARE

## 2019-01-01 ENCOUNTER — HOME CARE VISIT (OUTPATIENT)
Dept: SCHEDULING | Facility: HOME HEALTH | Age: 84
End: 2019-01-01

## 2019-01-01 ENCOUNTER — APPOINTMENT (OUTPATIENT)
Dept: CT IMAGING | Age: 84
DRG: 283 | End: 2019-01-01
Attending: EMERGENCY MEDICINE
Payer: MEDICARE

## 2019-01-01 ENCOUNTER — HOSPITAL ENCOUNTER (INPATIENT)
Age: 84
LOS: 1 days | DRG: 283 | End: 2019-01-04
Attending: EMERGENCY MEDICINE | Admitting: INTERNAL MEDICINE
Payer: MEDICARE

## 2019-01-01 ENCOUNTER — HOME HEALTH ADMISSION (OUTPATIENT)
Dept: HOME HEALTH SERVICES | Facility: HOME HEALTH | Age: 84
End: 2019-01-01

## 2019-01-01 ENCOUNTER — PATIENT OUTREACH (OUTPATIENT)
Dept: CASE MANAGEMENT | Age: 84
End: 2019-01-01

## 2019-01-01 VITALS
RESPIRATION RATE: 18 BRPM | HEART RATE: 83 BPM | BODY MASS INDEX: 18.31 KG/M2 | TEMPERATURE: 97.9 F | WEIGHT: 97 LBS | HEIGHT: 61 IN | DIASTOLIC BLOOD PRESSURE: 76 MMHG | SYSTOLIC BLOOD PRESSURE: 131 MMHG | OXYGEN SATURATION: 95 %

## 2019-01-01 VITALS
WEIGHT: 97 LBS | OXYGEN SATURATION: 90 % | TEMPERATURE: 97.4 F | RESPIRATION RATE: 18 BRPM | HEART RATE: 43 BPM | BODY MASS INDEX: 18.31 KG/M2 | HEIGHT: 61 IN | DIASTOLIC BLOOD PRESSURE: 44 MMHG | SYSTOLIC BLOOD PRESSURE: 66 MMHG

## 2019-01-01 DIAGNOSIS — Z63.4: ICD-10-CM

## 2019-01-01 DIAGNOSIS — K55.059 AMI (ACUTE MESENTERIC ISCHEMIA): Primary | ICD-10-CM

## 2019-01-01 PROBLEM — R55 SYNCOPE: Status: RESOLVED | Noted: 2018-01-01 | Resolved: 2019-01-01

## 2019-01-01 LAB
ALBUMIN SERPL-MCNC: 3.6 G/DL (ref 3.5–5)
ALBUMIN/GLOB SERPL: 0.9 {RATIO} (ref 1.1–2.2)
ALP SERPL-CCNC: 113 U/L (ref 45–117)
ALT SERPL-CCNC: 25 U/L (ref 12–78)
ANION GAP SERPL CALC-SCNC: 8 MMOL/L (ref 5–15)
ANION GAP SERPL CALC-SCNC: 8 MMOL/L (ref 5–15)
ARTERIAL PATENCY WRIST A: NO
AST SERPL-CCNC: 33 U/L (ref 15–37)
ATRIAL RATE: 83 BPM
BASE DEFICIT BLD-SCNC: 1 MMOL/L
BASOPHILS # BLD: 0 K/UL (ref 0–0.1)
BASOPHILS NFR BLD: 0 % (ref 0–1)
BDY SITE: ABNORMAL
BILIRUB SERPL-MCNC: 0.3 MG/DL (ref 0.2–1)
BUN SERPL-MCNC: 30 MG/DL (ref 6–20)
BUN SERPL-MCNC: 33 MG/DL (ref 6–20)
BUN/CREAT SERPL: 21 (ref 12–20)
BUN/CREAT SERPL: 26 (ref 12–20)
CALCIUM SERPL-MCNC: 9 MG/DL (ref 8.5–10.1)
CALCIUM SERPL-MCNC: 9 MG/DL (ref 8.5–10.1)
CALCULATED R AXIS, ECG10: 39 DEGREES
CALCULATED T AXIS, ECG11: -169 DEGREES
CHLORIDE SERPL-SCNC: 103 MMOL/L (ref 97–108)
CHLORIDE SERPL-SCNC: 103 MMOL/L (ref 97–108)
CO2 SERPL-SCNC: 25 MMOL/L (ref 21–32)
CO2 SERPL-SCNC: 27 MMOL/L (ref 21–32)
COMMENT, HOLDF: NORMAL
CREAT SERPL-MCNC: 1.25 MG/DL (ref 0.55–1.02)
CREAT SERPL-MCNC: 1.43 MG/DL (ref 0.55–1.02)
DIAGNOSIS, 93000: NORMAL
DIFFERENTIAL METHOD BLD: ABNORMAL
EOSINOPHIL # BLD: 0.1 K/UL (ref 0–0.4)
EOSINOPHIL NFR BLD: 1 % (ref 0–7)
ERYTHROCYTE [DISTWIDTH] IN BLOOD BY AUTOMATED COUNT: 21.2 % (ref 11.5–14.5)
GAS FLOW.O2 O2 DELIVERY SYS: ABNORMAL L/MIN
GAS FLOW.O2 SETTING OXYMISER: 3 L/M
GLOBULIN SER CALC-MCNC: 4 G/DL (ref 2–4)
GLUCOSE BLD STRIP.AUTO-MCNC: 110 MG/DL (ref 65–100)
GLUCOSE SERPL-MCNC: 117 MG/DL (ref 65–100)
GLUCOSE SERPL-MCNC: 178 MG/DL (ref 65–100)
HCO3 BLD-SCNC: 24.2 MMOL/L (ref 22–26)
HCT VFR BLD AUTO: 30.2 % (ref 35–47)
HGB BLD-MCNC: 8.5 G/DL (ref 11.5–16)
IMM GRANULOCYTES # BLD: 0 K/UL
IMM GRANULOCYTES NFR BLD AUTO: 0 %
LACTATE SERPL-SCNC: 1.7 MMOL/L (ref 0.4–2)
LYMPHOCYTES # BLD: 0.9 K/UL (ref 0.8–3.5)
LYMPHOCYTES NFR BLD: 7 % (ref 12–49)
MAGNESIUM SERPL-MCNC: 2.1 MG/DL (ref 1.6–2.4)
MCH RBC QN AUTO: 25.5 PG (ref 26–34)
MCHC RBC AUTO-ENTMCNC: 28.1 G/DL (ref 30–36.5)
MCV RBC AUTO: 90.7 FL (ref 80–99)
MONOCYTES # BLD: 0.6 K/UL (ref 0–1)
MONOCYTES NFR BLD: 5 % (ref 5–13)
NEUTS SEG # BLD: 11.3 K/UL (ref 1.8–8)
NEUTS SEG NFR BLD: 87 % (ref 32–75)
NRBC # BLD: 0 K/UL (ref 0–0.01)
NRBC BLD-RTO: 0 PER 100 WBC
PCO2 BLD: 42.4 MMHG (ref 35–45)
PH BLD: 7.36 [PH] (ref 7.35–7.45)
PLATELET # BLD AUTO: 279 K/UL (ref 150–400)
PMV BLD AUTO: 10.1 FL (ref 8.9–12.9)
PO2 BLD: 60 MMHG (ref 80–100)
POTASSIUM SERPL-SCNC: 3.4 MMOL/L (ref 3.5–5.1)
POTASSIUM SERPL-SCNC: 3.8 MMOL/L (ref 3.5–5.1)
PROT SERPL-MCNC: 7.6 G/DL (ref 6.4–8.2)
Q-T INTERVAL, ECG07: 398 MS
QRS DURATION, ECG06: 88 MS
QTC CALCULATION (BEZET), ECG08: 467 MS
RBC # BLD AUTO: 3.33 M/UL (ref 3.8–5.2)
RBC MORPH BLD: ABNORMAL
SAMPLES BEING HELD,HOLD: NORMAL
SAO2 % BLD: 90 % (ref 92–97)
SERVICE CMNT-IMP: ABNORMAL
SODIUM SERPL-SCNC: 136 MMOL/L (ref 136–145)
SODIUM SERPL-SCNC: 138 MMOL/L (ref 136–145)
SPECIMEN TYPE: ABNORMAL
TOTAL RESP. RATE, ITRR: 18
TROPONIN I SERPL-MCNC: 0.81 NG/ML
VENTRICULAR RATE, ECG03: 83 BPM
WBC # BLD AUTO: 12.9 K/UL (ref 3.6–11)

## 2019-01-01 PROCEDURE — 74011250637 HC RX REV CODE- 250/637: Performed by: NURSE PRACTITIONER

## 2019-01-01 PROCEDURE — 80048 BASIC METABOLIC PNL TOTAL CA: CPT

## 2019-01-01 PROCEDURE — 96360 HYDRATION IV INFUSION INIT: CPT

## 2019-01-01 PROCEDURE — 84484 ASSAY OF TROPONIN QUANT: CPT

## 2019-01-01 PROCEDURE — G8987 SELF CARE CURRENT STATUS: HCPCS

## 2019-01-01 PROCEDURE — 36600 WITHDRAWAL OF ARTERIAL BLOOD: CPT

## 2019-01-01 PROCEDURE — 71045 X-RAY EXAM CHEST 1 VIEW: CPT

## 2019-01-01 PROCEDURE — 74011000250 HC RX REV CODE- 250: Performed by: EMERGENCY MEDICINE

## 2019-01-01 PROCEDURE — 77030029684 HC NEB SM VOL KT MONA -A

## 2019-01-01 PROCEDURE — 93005 ELECTROCARDIOGRAM TRACING: CPT

## 2019-01-01 PROCEDURE — 65270000029 HC RM PRIVATE

## 2019-01-01 PROCEDURE — 94640 AIRWAY INHALATION TREATMENT: CPT

## 2019-01-01 PROCEDURE — 74011250636 HC RX REV CODE- 250/636: Performed by: EMERGENCY MEDICINE

## 2019-01-01 PROCEDURE — 36415 COLL VENOUS BLD VENIPUNCTURE: CPT

## 2019-01-01 PROCEDURE — 83735 ASSAY OF MAGNESIUM: CPT

## 2019-01-01 PROCEDURE — G8989 SELF CARE D/C STATUS: HCPCS

## 2019-01-01 PROCEDURE — 77010033678 HC OXYGEN DAILY

## 2019-01-01 PROCEDURE — 97165 OT EVAL LOW COMPLEX 30 MIN: CPT

## 2019-01-01 PROCEDURE — 74011250636 HC RX REV CODE- 250/636: Performed by: INTERNAL MEDICINE

## 2019-01-01 PROCEDURE — 99285 EMERGENCY DEPT VISIT HI MDM: CPT

## 2019-01-01 PROCEDURE — 80053 COMPREHEN METABOLIC PANEL: CPT

## 2019-01-01 PROCEDURE — 82803 BLOOD GASES ANY COMBINATION: CPT

## 2019-01-01 PROCEDURE — 97535 SELF CARE MNGMENT TRAINING: CPT

## 2019-01-01 PROCEDURE — G0299 HHS/HOSPICE OF RN EA 15 MIN: HCPCS

## 2019-01-01 PROCEDURE — 74011250637 HC RX REV CODE- 250/637: Performed by: INTERNAL MEDICINE

## 2019-01-01 PROCEDURE — 85025 COMPLETE CBC W/AUTO DIFF WBC: CPT

## 2019-01-01 PROCEDURE — 82962 GLUCOSE BLOOD TEST: CPT

## 2019-01-01 PROCEDURE — 96361 HYDRATE IV INFUSION ADD-ON: CPT

## 2019-01-01 PROCEDURE — 83605 ASSAY OF LACTIC ACID: CPT

## 2019-01-01 PROCEDURE — G8988 SELF CARE GOAL STATUS: HCPCS

## 2019-01-01 RX ORDER — SCOLOPAMINE TRANSDERMAL SYSTEM 1 MG/1
1 PATCH, EXTENDED RELEASE TRANSDERMAL
Status: CANCELLED | OUTPATIENT
Start: 2019-01-01

## 2019-01-01 RX ORDER — ACETAMINOPHEN 650 MG/1
650 SUPPOSITORY RECTAL
Status: CANCELLED | OUTPATIENT
Start: 2019-01-01

## 2019-01-01 RX ORDER — ALBUTEROL SULFATE 0.83 MG/ML
2.5 SOLUTION RESPIRATORY (INHALATION)
Status: CANCELLED | OUTPATIENT
Start: 2019-01-01

## 2019-01-01 RX ORDER — MORPHINE SULFATE 20 MG/ML
10 SOLUTION ORAL
Status: CANCELLED | OUTPATIENT
Start: 2019-01-01

## 2019-01-01 RX ORDER — SODIUM CHLORIDE 9 MG/ML
75 INJECTION, SOLUTION INTRAVENOUS CONTINUOUS
Status: DISCONTINUED | OUTPATIENT
Start: 2019-01-01 | End: 2019-01-05 | Stop reason: HOSPADM

## 2019-01-01 RX ORDER — LEVOFLOXACIN 250 MG/1
250 TABLET ORAL DAILY
Qty: 7 TAB | Refills: 0 | Status: SHIPPED | OUTPATIENT
Start: 2019-01-01 | End: 2019-01-01 | Stop reason: SDUPTHER

## 2019-01-01 RX ORDER — FUROSEMIDE 20 MG/1
20 TABLET ORAL DAILY
Qty: 30 TAB | Refills: 0 | Status: SHIPPED | OUTPATIENT
Start: 2019-01-01

## 2019-01-01 RX ORDER — HALOPERIDOL 2 MG/ML
2 SOLUTION ORAL
Status: CANCELLED | OUTPATIENT
Start: 2019-01-01

## 2019-01-01 RX ORDER — HYOSCYAMINE SULFATE 0.12 MG/1
0.12 TABLET SUBLINGUAL
Status: CANCELLED | OUTPATIENT
Start: 2019-01-01

## 2019-01-01 RX ORDER — LEVOFLOXACIN 500 MG/1
500 TABLET, FILM COATED ORAL ONCE
Status: DISCONTINUED | OUTPATIENT
Start: 2019-01-01 | End: 2019-01-01 | Stop reason: DRUGHIGH

## 2019-01-01 RX ORDER — LEVOFLOXACIN 500 MG/1
500 TABLET, FILM COATED ORAL ONCE
Status: COMPLETED | OUTPATIENT
Start: 2019-01-01 | End: 2019-01-01

## 2019-01-01 RX ORDER — MORPHINE SULFATE 10 MG/ML
10 INJECTION, SOLUTION INTRAMUSCULAR; INTRAVENOUS
Status: CANCELLED | OUTPATIENT
Start: 2019-01-01

## 2019-01-01 RX ORDER — ACETAMINOPHEN 325 MG/1
650 TABLET ORAL
Status: CANCELLED | OUTPATIENT
Start: 2019-01-01

## 2019-01-01 RX ORDER — LEVOFLOXACIN 250 MG/1
250 TABLET ORAL EVERY OTHER DAY
Qty: 5 TAB | Refills: 0 | Status: SHIPPED | OUTPATIENT
Start: 2019-01-01 | End: 2019-01-10

## 2019-01-01 RX ORDER — HALOPERIDOL 5 MG/ML
2 INJECTION INTRAMUSCULAR
Status: CANCELLED | OUTPATIENT
Start: 2019-01-01

## 2019-01-01 RX ORDER — AMOXICILLIN 250 MG
2 CAPSULE ORAL
Status: CANCELLED | OUTPATIENT
Start: 2019-01-01

## 2019-01-01 RX ORDER — ONDANSETRON 4 MG/1
4 TABLET, ORALLY DISINTEGRATING ORAL
Status: CANCELLED | OUTPATIENT
Start: 2019-01-01

## 2019-01-01 RX ADMIN — ALBUTEROL SULFATE 1 DOSE: 2.5 SOLUTION RESPIRATORY (INHALATION) at 16:20

## 2019-01-01 RX ADMIN — GABAPENTIN 300 MG: 300 CAPSULE ORAL at 05:53

## 2019-01-01 RX ADMIN — Medication 10 ML: at 05:54

## 2019-01-01 RX ADMIN — FUROSEMIDE 20 MG: 20 TABLET ORAL at 08:36

## 2019-01-01 RX ADMIN — DILTIAZEM HYDROCHLORIDE 180 MG: 180 CAPSULE, COATED, EXTENDED RELEASE ORAL at 08:36

## 2019-01-01 RX ADMIN — HEPARIN SODIUM 5000 UNITS: 5000 INJECTION INTRAVENOUS; SUBCUTANEOUS at 11:46

## 2019-01-01 RX ADMIN — SODIUM CHLORIDE 75 ML/HR: 900 INJECTION, SOLUTION INTRAVENOUS at 17:41

## 2019-01-01 RX ADMIN — PANTOPRAZOLE SODIUM 40 MG: 40 TABLET, DELAYED RELEASE ORAL at 08:36

## 2019-01-01 RX ADMIN — LEVOFLOXACIN 500 MG: 500 TABLET, FILM COATED ORAL at 07:07

## 2019-01-01 RX ADMIN — LISINOPRIL 10 MG: 10 TABLET ORAL at 08:36

## 2019-01-01 SDOH — SOCIAL STABILITY - SOCIAL INSECURITY: DISSAPEARANCE AND DEATH OF FAMILY MEMBER: Z63.4

## 2019-01-01 NOTE — DISCHARGE SUMMARY
Discharge Summary PATIENT ID: Dennie Gallon MRN: [id number] YOB: 1922 DATE OF ADMISSION: 12/29/2018 11:39 AM   
DATE OF DISCHARGE: 1/1/19 PRIMARY CARE PROVIDER: Bettye Olivares MD  
 
ATTENDING PHYSICIAN: Dr. Kane Alvarado DISCHARGING PROVIDER: Clifton Millard NP To contact this individual call [phone] and ask the  to page. If unavailable ask to be transferred the Adult Hospitalist Department. CONSULTATIONS: IP CONSULT TO HOSPITALIST 
IP CONSULT TO CARDIOLOGY PROCEDURES/SURGERIES: * No surgery found * [id number] [facility] COURSE:  
 Ms. Daniele Segal is a 81 yo female who resided at an [facility] who presented to the ED after an episode of near passing out which she felt dizzy and lowered herself to the floor. She had no LOC. Had some progressive SOB in several days preceding this. In ED found to have B/L infiltrates on CXR and a left pleural effusion. She was started on antiboitics to cover CAP and an ECHO was obtained. ECHO demonstrated some worsening CHF and medications adjusted accordingly. She improved significantly over course of several days and family felt as though she was back to herself. Had some anxiety initially with the hospital stay which she attributed to not sleeping. Once she got rest that evening she did remarkably well. Discussed all of the test results with her and her daughter and son in law at bedside. They want medical management and are not interested in any aggressive interventions. They are aware of plan to call Dr. Gene Brooks for F/U appt in next several days and discussed taking Lasix daily over next several days to ensure fluid status is euvolemic. She will need repeat BMP at appt to ensure kidney function remains stable. Her VS have been stable and she was cleared for D/C home with HHT by PT/OT. Her family and her are all in agreement with plan. Discussed with Dr. Kane Alvarado.  
  
Anxiety: - Much improved with rest 
 - She takes Tylenol PM at home and once restarted did much better.  
  
Near syncope:   
- Probable vasovagal event 
- None here and not orthostatic 
  
Bilateral bacterial community-acquired pneumonia (POA):  
- no acute respiratory failure and no sepsis - Chest XRay: Bilateral lung infiltrates and pleural effusions, superimposed on diffuse 
interstitial prominence. Correlate with clinical symptoms for congestive heart failure,  inflammatory infiltrates in the lung bases cannot be excluded. - continue oxygen -> she is on home O2 at 3L 
- encourage incentive spirometry. - no blood cultures ordered on arrival 
- Received Rocephin & Azithromycin during admission -> transition to Levofloxacin with renal dosing to complete therapy 
  
Acute systolic and diastolic on chronic CHF (POA) - Suspect much of the SOB is related to PNA combined with CHF 
- ECHO with EF 40-45% and severe hypokinesis -> discussed with her and family 
- Continue Lisinopril. Unable to use BB due to allergy. GI bleed in the past with ASA - Sees Dr. Nieves [facility] with [facility] as an OP and family arranging a follow up appt with him next week - Would benefit from a slightly higher dose of Lasix at discharge at least temporarily to help with effusions/SOB. She was only taking this as needed and mostly every other day. She will now take daily until her F/U with Cardiology -> will need her kidney function monitored which was discussed with her 
  
Chronic Atrial Fibrillation:  
- on no OAC PTA 
- continue with digoxin and cardizem. Allergy noted to BB 
  
Diet-controlled diabetes mellitus:   
- diabetic diet 
  
Fatigue:  
- pt with hx of anemia and GI bleeding. Pt on PPI and Hgb stable - check iron panel -> Iron 32, TIBC 376, Iron % 9 
- check vitamin D level - Level is 25.3 -> Insufficiency  
  
Hx of left breast cancer: treated with lumpectomy and in remission. 
  
  
 
 
 
PENDING TEST RESULTS:  
 At the time of discharge the following test results are still pending: None FOLLOW UP APPOINTMENTS:   
Follow-up Information Follow up With Specialties Details Why Contact Info Rachelle rAchuleta MD Family Practice In 3 days  [address] [address] 
[phone] Marina Horton MD Cardiology Schedule an appointment as soon as possible for a visit in 3 days Please call the office on Wednesday morning for a hospital discharge appointment to be done this week. [facility] [address] [facility] 
[phone] [facility] [place] [address] 
[phone] ADDITIONAL CARE RECOMMENDATIONS:  
 
1. Complete entire course of antibiotics prescribed to treat the pneumonia. You will start your first dose on 1/3 (THURSDAY) and these will be taken every other day.  
  
2. If you begin to feel short of breath or have increased swelling in your legs, go ahead and take an extra dose of Lasix (1 additional pill). If you find yourself taking an extra dose for 3 days or more, please call Dr. Maddie Fields for additional instructions. 
  
3. Please have Dr. Maddie Fields or your primary care provider check your kidney function this week at your follow up appointment to ensure it remains unchanged. DIET: Cardiac Diet ACTIVITY: Activity as tolerated EQUIPMENT needed: Home oxygen at 3 liters DISCHARGE MEDICATIONS: 
Current Discharge Medication List  
  
START taking these medications Details  
levoFLOXacin (LEVAQUIN) 250 mg tablet Take 1 Tab by mouth every other day for 7 days. Qty: 5 Tab, Refills: 0 CONTINUE these medications which have CHANGED Details  
furosemide (LASIX) 20 mg tablet Take 1 Tab by mouth daily. Qty: 30 Tab, Refills: 0 CONTINUE these medications which have NOT CHANGED Details  
pantoprazole (PROTONIX) 40 mg tablet Take 40 mg by mouth daily.  before breakfast and before dinner  
  
!! gabapentin (NEURONTIN) 300 mg capsule Take 600 mg by mouth every evening. dilTIAZem CD (CARTIA XT) 180 mg ER capsule Take 180 mg by mouth daily. lisinopril (PRINIVIL, ZESTRIL) 10 mg tablet Take 10 mg by mouth daily. digoxin (LANOXIN) 0.125 mg tablet Take 1 Tab by mouth every other day. Qty: 30 Tab, Refills: 0  
  
!! gabapentin (NEURONTIN) 300 mg capsule Take 300 mg by mouth every morning.  
  
montelukast (SINGULAIR) 10 mg tablet Take 10 mg by mouth every evening. !! - Potential duplicate medications found. Please discuss with provider. STOP taking these medications LORazepam (ATIVAN) 0.5 mg tablet Comments:  
Reason for Stopping:   
   
  
 
 
 
NOTIFY YOUR PHYSICIAN FOR ANY OF THE FOLLOWING:  
Fever over 101 degrees for 24 hours. Chest pain, shortness of breath, fever, chills, nausea, vomiting, diarrhea, change in mentation, falling, weakness, bleeding. Severe pain or pain not relieved by medications. Or, any other signs or symptoms that you may have questions about. DISPOSITION: 
x  Home With: 
x OT x PT  HH  RN  
  
 Long term SNF/Inpatient Rehab Independent/assisted living Hospice Other:  
 
 
PATIENT CONDITION AT DISCHARGE:  
 
Functional status Poor Deconditioned   
x Independent Cognition  
x  Lucid Forgetful Dementia Catheters/lines (plus indication) Geronimo PICC   
 PEG   
x None Code status Full code   
x DNR PHYSICAL EXAMINATION AT DISCHARGE: 
General: No acute distress, cooperative, pleasant  
EENT: EOMI. Anicteric sclerae. Oral mucous moist, oropharynx benign Resp: CTA bilaterally. No wheezing/rhonchi/rales. No accessory muscle use. Decreased to left base but overall good aeration CV: Regular rhythm, normal rate, no murmurs, gallops, rubs GI: Soft, non distended, non tender. normoactive bowel sounds Extremities: No edema, warm, 2+ pulses throughout Neurologic: Moves all extremities. AAOx3, CN II-XII grossly intact Psych: Good insight. Not anxious nor agitated. Skin: Good Turgor, no rashes or ulcers CHRONIC MEDICAL DIAGNOSES: 
Problem List as of 1/1/2019 Date Reviewed: 1/1/2019 Codes Class Noted - Resolved Respiratory failure, acute ([facility] ICD-10-CM: J96.00 
ICD-9-CM: 518.81  3/12/2016 - Present Hypertension ICD-10-CM: I10 
ICD-9-CM: 401.9  3/12/2016 - Present Metabolic acidosis ZEQ-39-FT: E87.2 ICD-9-CM: 276.2  3/12/2016 - Present Pneumonia ICD-10-CM: J18.9 ICD-9-CM: 062  3/9/2016 - Present Acute on chronic combined systolic and diastolic ACC/AHA stage C congestive heart failure (HCC) (Chronic) ICD-10-CM: I50.43 ICD-9-CM: 428.43, 428.0  4/29/2015 - Present Anemia ICD-10-CM: D64.9 ICD-9-CM: 285.9  3/30/2015 - Present Breast CA ([facility] ICD-10-CM: C50.919 ICD-9-CM: 174.9  4/12/2011 - Present Overview Signed 4/12/2011  3:01 PM by Lou Woodall MD  
  Left Breast 
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 
ICD-9-CM: 780.2  12/29/2018 - 1/1/2019 RESOLVED: SOB (shortness of breath) ICD-10-CM: R06.02 
ICD-9-CM: 786.05  1/5/2018 - 1/7/2018 RESOLVED: Acute respiratory failure with hypoxia ([facility] ICD-10-CM: J96.01 
ICD-9-CM: 518.81  4/27/2015 - 4/28/2015 RESOLVED: Atrial fibrillation with RVR ([facility] ICD-10-CM: I48.91 
ICD-9-CM: 427.31  3/30/2015 - 4/3/2015 Greater than 60 minutes were spent with the patient on counseling and coordination of care Signed:  
[place]on [place] NP 
1/1/2019 
11:13 AM

## 2019-01-01 NOTE — DISCHARGE INSTRUCTIONS
DISCHARGE SUMMARY from Nurse    PATIENT INSTRUCTIONS:    After general anesthesia or intravenous sedation, for 24 hours or while taking prescription Narcotics:  · Limit your activities  · Do not drive and operate hazardous machinery  · Do not make important personal or business decisions  · Do  not drink alcoholic beverages  · If you have not urinated within 8 hours after discharge, please contact your surgeon on call. Report the following to your surgeon:  · Excessive pain, swelling, redness or odor of or around the surgical area  · Temperature over 100.5  · Nausea and vomiting lasting longer than 4 hours or if unable to take medications  · Any signs of decreased circulation or nerve impairment to extremity: change in color, persistent  numbness, tingling, coldness or increase pain  · Any questions    What to do at Home:    *  Please give a list of your current medications to your Primary Care Provider. *  Please update this list whenever your medications are discontinued, doses are      changed, or new medications (including over-the-counter products) are added. *  Please carry medication information at all times in case of emergency situations. These are general instructions for a healthy lifestyle:    No smoking/ No tobacco products/ Avoid exposure to second hand smoke  Surgeon General's Warning:  Quitting smoking now greatly reduces serious risk to your health. Obesity, smoking, and sedentary lifestyle greatly increases your risk for illness    A healthy diet, regular physical exercise & weight monitoring are important for maintaining a healthy lifestyle    You may be retaining fluid if you have a history of heart failure or if you experience any of the following symptoms:  Weight gain of 3 pounds or more overnight or 5 pounds in a week, increased swelling in our hands or feet or shortness of breath while lying flat in bed.   Please call your doctor as soon as you notice any of these symptoms; do not wait until your next office visit. Recognize signs and symptoms of STROKE:    F-face looks uneven    A-arms unable to move or move unevenly    S-speech slurred or non-existent    T-time-call 911 as soon as signs and symptoms begin-DO NOT go       Back to bed or wait to see if you get better-TIME IS BRAIN. Warning Signs of HEART ATTACK     Call 911 if you have these symptoms:   Chest discomfort. Most heart attacks involve discomfort in the center of the chest that lasts more than a few minutes, or that goes away and comes back. It can feel like uncomfortable pressure, squeezing, fullness, or pain.  Discomfort in other areas of the upper body. Symptoms can include pain or discomfort in one or both arms, the back, neck, jaw, or stomach.  Shortness of breath with or without chest discomfort.  Other signs may include breaking out in a cold sweat, nausea, or lightheadedness. Don't wait more than five minutes to call 911 - MINUTES MATTER! Fast action can save your life. Calling 911 is almost always the fastest way to get lifesaving treatment. Emergency Medical Services staff can begin treatment when they arrive -- up to an hour sooner than if someone gets to the hospital by car. The discharge information has been reviewed with the patient. The patient verbalized understanding. Discharge medications reviewed with the patient and appropriate educational materials and side effects teaching were provided. [employer] Activation    Thank you for requesting access to [employer]. Please follow the instructions below to securely access and download your online medical record. [employer] allows you to send messages to your doctor, view your test results, renew your prescriptions, schedule appointments, and more. How Do I Sign Up? 1. In your internet browser, go to [email or web address]  2. Click on the First Time User? Click Here link in the Sign In box.  You will be redirect to the New Member Sign Up page.  3. Enter your [employer] Access Code exactly as it appears below. You will not need to use this code after youve completed the sign-up process. If you do not sign up before the expiration date, you must request a new code. [employer]us Access Code: [email or web address]  Expires: 2019  6:25 AM (This is the date your [employer]us access code will )    4. Enter the last four digits of your Social Security Number (xxxx) and Date of Birth (mm/dd/yyyy) as indicated and click Submit. You will be taken to the next sign-up page. 5. Create a Maestranot ID. This will be your [employer]us login ID and cannot be changed, so think of one that is secure and easy to remember. 6. Create a [employer]us password. You can change your password at any time. 7. Enter your Password Reset Question and Answer. This can be used at a later time if you forget your password. 8. Enter your e-mail address. You will receive e-mail notification when new information is available in [address] 9. Click Sign Up. You can now view and download portions of your medical record. 10. Click the Download Summary menu link to download a portable copy of your medical information. Additional Information    If you have questions, please visit the Frequently Asked Questions section of the [employer] website at [email or web address] Remember, [employer] is NOT to be used for urgent needs. For medical emergencies, dial 911.      ___________________________________________________________________________________________________________________________________   Discharge Instructions       PATIENT ID: Rosemarie De Leon  MRN: [id number]   YOB: 1922    DATE OF ADMISSION: 2018 11:39 AM    DATE OF DISCHARGE: 2019    PRIMARY CARE PROVIDER: Tucker Sumner MD     ATTENDING PHYSICIAN: Julián Barrios MD  DISCHARGING PROVIDER: Timothy De Leon NP    To contact this individual call [phone] and ask the  to page.    If unavailable ask to be transferred the Adult Hospitalist Department. DISCHARGE DIAGNOSES: Pneumonia and heart failure exacerbation    CONSULTATIONS: IP CONSULT TO HOSPITALIST  IP CONSULT TO CARDIOLOGY    PROCEDURES/SURGERIES: * No surgery found *    PENDING TEST RESULTS:   At the time of discharge the following test results are still pending: None    FOLLOW UP APPOINTMENTS:   Follow-up Information     Follow up With Specialties Details Why Contact Info    Farrah Farris MD Family Practice In 3 days  [facility] [address]      Andrés Noel MD Cardiology Schedule an appointment as soon as possible for a visit in 3 days Please call the office on Wednesday morning for a hospital discharge appointment to be done this week. [address]  [phone]             ADDITIONAL CARE RECOMMENDATIONS:     1. Complete entire course of antibiotics prescribed to treat the pneumonia. You will start your first dose on 1/3 (THURSDAY) and these will be taken every other day. 2. If you begin to feel short of breath or have increased swelling in your legs, go ahead and take an extra dose of Lasix (1 additional pill). If you find yourself taking an extra dose for 3 days or more, please call Dr. Blanca Kumari for additional instructions. 3. Please have Dr. Blanca Kumari or your primary care provider check your kidney function this week at your follow up appointment to ensure it remains unchanged. Levofloxacin (Levaquin, Levaquin Leva-elo) - (By mouth)   Why this medicine is used:   Treats infections.   Contact a nurse or doctor right away if you have:  · Blistering, peeling, red skin rash  · Fast, slow, or uneven heartbeat; lightheadedness or fainting  · Dark urine or pale stools, loss of appetite, stomach pain, yellow skin or eyes  · Severe or bloody diarrhea  · Pain, stiffness, swelling, or bruises around your ankle, leg, shoulder, or other joint Common side effects:  · Mild nausea, vomiting, diarrhea  · Mild headache  © 2017 [facility] Information is for End User's use only and may not be sold, redistributed or otherwise used for commercial purposes. DIET: Cardiac Diet    ACTIVITY: Activity as tolerated      DISCHARGE MEDICATIONS:   See Medication Reconciliation Form    · It is important that you take the medication exactly as they are prescribed. · Keep your medication in the bottles provided by the pharmacist and keep a list of the medication names, dosages, and times to be taken in your wallet. · Do not take other medications without consulting your doctor. NOTIFY YOUR PHYSICIAN FOR ANY OF THE FOLLOWING:   Fever over 101 degrees for 24 hours. Chest pain, shortness of breath, fever, chills, nausea, vomiting, diarrhea, change in mentation, falling, weakness, bleeding. Severe pain or pain not relieved by medications. Or, any other signs or symptoms that you may have questions about.       DISPOSITION:  x  Home With:  x OT x PT  HH  RN       SNF/Inpatient Rehab/LTAC    Independent/assisted living    Hospice    Other:       Signed:   Bo Albrecht NP  1/1/2019  8:50 AM

## 2019-01-01 NOTE — PROGRESS NOTES
Problem: Self Care Deficits Care Plan (Adult) Goal: *Acute Goals and Plan of Care (Insert Text) Occupational Therapy EVALUATION/discharge Patient: Dennie Gallon (75 y.o. female) Date: 1/1/2019 Primary Diagnosis: Syncope Precautions:   Fall ASSESSMENT:  
Based on the objective data described below, the patient presents with decreased independence with self care and functional mobility following admission for syncope and debility. Pt with PNA. Pt does use O2 3L/min at home. She uses a rollator with portable O2. Pt does present with generalized weakness overall but able to complete bathing and dressing with supervision and additional time this morning. Pt does require rest due to fatigue but pt is recovering from PNA and is 80years old. She does have a hired nurse who assists with showering 3 x week and will have support of her daughter once discharge. Pt has no further need for OT intervention. Recommend home with family support. Pt with good safety awareness and good carry over of training provided for energy conservation. Further skilled acute occupational therapy is not indicated at this time. Discharge Recommendations: None Further Equipment Recommendations for Discharge: none SUBJECTIVE:  
Patient stated I am feeling so much better after washing.  OBJECTIVE DATA SUMMARY:  
HISTORY:  
Past Medical History:  
Diagnosis Date  Asthma  Cancer [facility]) 2011 LEFT breast   
 Diabetes ([facility] [address]  Hypertension Past Surgical History:  
Procedure Laterality Date  HX BREAST LUMPECTOMY  2011 LEFT breast   
 HX LAP CHOLECYSTECTOMY Prior Level of Function/Environment/Context: pt was mod I at home using rollator and 3 L/min. Expanded or extensive additional review of patient history:  
 
Home Situation Home Environment: Private residence # Steps to Enter: 0 One/Two Story Residence: One story Living Alone: Yes Support Systems: Family member(s) Patient Expects to be Discharged to[de-identified] Private residence Current DME Used/Available at Home: Rahat Thompson, rollator, Oxygen, portable, Shower chair Tub or Shower Type: Shower Hand dominance: RightEXAMINATION OF PERFORMANCE DEFICITS: 
Cognitive/Behavioral Status: 
Neurologic State: Alert Orientation Level: Oriented X4 Cognition: Appropriate for age attention/concentration Perception: Appears intact Perseveration: No perseveration noted Safety/Judgement: Good awareness of safety precautions Skin: see nursing notes Edema: none noted Hearing: Auditory Auditory Impairment: Hard of hearing, bilateral 
Hearing Aids/Status: With patient Vision/Perceptual:   
    
    
    
  
    
Acuity: Within Defined Limits Corrective Lenses: Glasses Range of Motion: 
 
AROM: Within functional limits PROM: Within functional limits Strength: 
 
Strength: Within functional limits Coordination: 
Coordination: Within functional limits Fine Motor Skills-Upper: Right Intact; Left Intact Gross Motor Skills-Upper: Right Intact; Left Intact Tone & Sensation: 
 
Tone: Normal 
Sensation: Intact Balance: 
Sitting: Intact Standing: Intact; With support Standing - Static: Good Standing - Dynamic : Good Functional Mobility and Transfers for ADLs:Bed Mobility: 
Supine to Sit: Modified independent Sit to Supine: (remained OOB in chair) Transfers: 
Sit to Stand: Supervision Stand to Sit: Supervision Bed to Chair: Supervision Bathroom Mobility: Supervision/set up Toilet Transfer : Supervision ADL Assessment: 
Feeding: Supervision Oral Facial Hygiene/Grooming: Supervision Bathing: Minimum assistance Upper Body Dressing: Supervision Lower Body Dressing: Supervision Toileting: Supervision ADL Intervention and task modifications: 
  
Pt was able to progress with toileting activities, bathing, and dressing from chair level. Pt did well with all activities and provided training for energy conservation. Cognitive Retraining Safety/Judgement: Good awareness of safety precautions Functional Measure: 
Barthel Index: 
 
Bathin Bladder: 10 Bowels: 10 
Groomin Dressin Feeding: 10 Mobility: 10 Stairs: 0 Toilet Use: 5 Transfer (Bed to Chair and Back): 10 Total: 65 Barthel and G-code impairment scale: 
Percentage of impairment CH 
0% CI 
1-19% CJ 
20-39% CK 
40-59% CL 
60-79% CM 
80-99% CN 
100% Barthel Score 0-100 100 99-80 79-60 59-40 20-39 1-19 
 0 Barthel Score 0-20 20 17-19 13-16 9-12 5-8 1-4 0 The Barthel ADL Index: Guidelines 1. The index should be used as a record of what a patient does, not as a record of what a patient could do. 2. The main aim is to establish degree of independence from any help, physical or verbal, however minor and for whatever reason. 3. The need for supervision renders the patient not independent. 4. A patient's performance should be established using the best available evidence. Asking the patient, friends/relatives and nurses are the usual sources, but direct observation and common sense are also important. However direct testing is not needed. 5. Usually the patient's performance over the preceding 24-48 hours is important, but occasionally longer periods will be relevant. 6. Middle categories imply that the patient supplies over 50 per cent of the effort. 7. Use of aids to be independent is allowed. Geovanny Alexandre., Barthel, D.W. ([id number]). Functional evaluation: the Barthel Index. [facility] (14)2. Albanma CLYDE Earl, Tracie Ly., [facility], [address] [facility] (). Measuring the change indisability after inpatient rehabilitation; comparison of the responsiveness of the Barthel Index and Functional [place] Measure. Journal of Neurology, Neurosurgery, and Psychiatry, 66(4), 999-376. ANAI Tovar, GUME Dey, & Michael Dunn M.A. (2004.) Assessment of post-stroke quality of life in cost-effectiveness studies: The usefulness of the Barthel Index and the EuroQoL-5D. [facility], 13, 521-97 G codes: In compliance with CMSs Claims Based Outcome Reporting, the following G-code set was chosen for this patient based on their primary functional limitation being treated: The outcome measure chosen to determine the severity of the functional limitation was the Barthel Index with a score of 65/100 which was correlated with the impairment scale. ? Self Care:  
  - CURRENT STATUS: CJ - 20%-39% impaired, limited or restricted  - GOAL STATUS: CJ - 20%-39% impaired, limited or restricted  - D/C STATUS:  CJ - 20%-39% impaired, limited or restricted Occupational Therapy Evaluation Charge Determination History Examination Decision-Making LOW Complexity : Brief history review  LOW Complexity : 1-3 performance deficits relating to physical, cognitive , or psychosocial skils that result in activity limitations and / or participation restrictions  LOW Complexity : No comorbidities that affect functional and no verbal or physical assistance needed to complete eval tasks Based on the above components, the patient evaluation is determined to be of the following complexity level: LOW Pain: 
  
  
  
  
  
  
Activity Tolerance: VSS throughout session. Patient Vitals for the past 4 hrs: 
 Temp Pulse Resp BP SpO2  
01/01/19 0821 97.9 °F (36.6 °C) 83 18 131/76 95 % After treatment:  
[x]  Patient left in no apparent distress sitting up in chair 
[]  Patient left in no apparent distress in bed 
[x]  Call bell left within reach [x]  Nursing notified 
[]  Caregiver present 
[]  Bed alarm activated COMMUNICATION/EDUCATION:  
Communication/Collaboration: 
[x]      Home safety education was provided and the patient/caregiver indicated understanding. [x]      Patient/family have participated as able and agree with findings and recommendations. []      Patient is unable to participate in plan of care at this time. Findings and recommendations were discussed with: Physical Therapist and Registered Nurse Osvaldo Enciso OT Time Calculation: 32 mins

## 2019-01-02 NOTE — PROGRESS NOTES
Hospital Discharge Follow-Up Date/Time:  2019 4:21 PM 
 
Patient was admitted to [address] [place] [address] on 18 and discharged on 19 for PCN. The physician discharge summary was available at the time of outreach. Patient was contacted within 1 business days of discharge. Top Challenges reviewed with the provider 80Year old admitted with PCN on home O2 at [place] [address]. Patient lives alone in a senior community. Patient was d/c on renal dose of Levaquin and increase dose of lasix. Will need to watch Renal function. At D/C Creat 1.43 BUN 30  On admission Creat 1.36 and BUN 29 Method of communication with provider :face to face, chart routing, staff message, phone, none Inpatient RRAT score: 16 Was this a readmission? no  
Patient stated reason for the readmission:  
 
Nurse Navigator (NN) contacted the family by telephone to perform post hospital discharge assessment. Verified name and  with family as identifiers. Provided introduction to self, and explanation of the Nurse Navigator role. Reviewed discharge instructions and red flags with family who verbalized understanding. Patient given an opportunity to ask questions and does not have any further questions or concerns at this time. The patient agrees to contact the PCP office for questions related to their healthcare. NN provided contact information for future reference. Disease Specific:   pcn 
 
Summary of patient's top problems: 
1. B-PCN:Chest XRay: Bilateral lung infiltrates and pleural effusions, superimposed on diffuse interstitial prominence. Correlate with clinical symptoms for congestive heart failure,  inflammatory infiltrates in the lung bases cannot be excluded. Patient was on Rocephin and Azithromycin then transition to Levaquin renal does. On home o2 at [place] [address]. Daughter stated patient is weak but getting up and moving around.   Daughter stated patient had a cough last night and she elevated her head on 2 pillows. Instructed the daughter to call PCP to see if she could take something for the cough. Daughter stated the cough was productive. 2. CHF:ECHO with EF 40-45% and severe hypokinesis Patient to continue the Lisinopril  Patient was placed on a slightly higher does of lasix to help with SOB and effusion. Will need to see Card to re evaluate dose. Will need to watch kidney functions. Spoke to daughter about daily wt's and keeping a record of her wt. Daughter stated patient does not have any edema at this time. Home Health orders at discharge: [facility] Health company: [facility] Date of initial visit: 1/4/19 Durable Medical Equipment ordered/company:  
Durable Medical Equipment received:  
  
Barriers to care? None Advance Care Planning:  
Does patient have an Advance Directive:  reviewed and current Medication(s):  
New Medications at Discharge:  
levoFLOXacin (LEVAQUIN) 250 mg tablet Take 1 Tab by mouth every other day for 7 days. Qty: 5 Tab Changed Medications at Discharge:  
furosemide (LASIX) 20 mg tablet Take 1 Tab by mouth daily. Qty: 30 Tab, Refills: 0 Discontinued Medications at Discharge: LORazepam (ATIVAN) 0.5 mg tablet Medication reconciliation was performed with family, who verbalizes understanding of administration of home medications. There were no barriers to obtaining medications identified at this time. Referral to Pharm D needed: no  
 
Current Outpatient Medications Medication Sig  furosemide (LASIX) 20 mg tablet Take 1 Tab by mouth daily.  [START ON 1/3/2019] levoFLOXacin (LEVAQUIN) 250 mg tablet Take 1 Tab by mouth every other day for 7 days.  pantoprazole (PROTONIX) 40 mg tablet Take 40 mg by mouth daily. before breakfast and before dinner  gabapentin (NEURONTIN) 300 mg capsule Take 600 mg by mouth every evening.  dilTIAZem CD (CARTIA XT) 180 mg ER capsule Take 180 mg by mouth daily.  lisinopril (PRINIVIL, ZESTRIL) 10 mg tablet Take 10 mg by mouth daily.  digoxin (LANOXIN) 0.125 mg tablet Take 1 Tab by mouth every other day.  gabapentin (NEURONTIN) 300 mg capsule Take 300 mg by mouth every morning.  montelukast (SINGULAIR) 10 mg tablet Take 10 mg by mouth every evening. No current facility-administered medications for this visit. There are no discontinued medications. [facility] follow up appointment(s): No future appointments. Non-[facility] follow up appointment(s): Dr Ayo Wallis 1/3/19 card   Dr Hilda Ashraf 1/7/19  PCP [facility]:  n/a

## 2019-01-04 PROBLEM — J15.9 BACTERIAL PNEUMONIA: Status: ACTIVE | Noted: 2019-01-01

## 2019-01-04 PROBLEM — I50.9 CHF (CONGESTIVE HEART FAILURE) (HCC): Status: ACTIVE | Noted: 2019-01-01

## 2019-01-04 NOTE — ED TRIAGE NOTES
Triage:  Pt to ED via EMS from home due to concerns over increased nausea and fatigue over the past several days. Pt states recent discharged due to PNA from the hospital.  Pt states has been on antibiotics but has noted the increased nausea over the past 2 days. EMS states stable transport, Pt given IV zofran for nausea. On arrival, Pt A/O x 4, no visible cues of distress.

## 2019-01-04 NOTE — ED PROVIDER NOTES
80 y.o. female with past medical history significant for HTN, asthma, DM, breast ca, who presents from EMS with chief complaint of light headedness. Pt has 3 days onset of light headedness that began after being discharged from the hospital on 1/1/19, while being treated for the same complaint. Per chart review, pt exhibited no signs of another syncopal episode during her admission. Per relative, she experienced a syncopal episode today w/ exertion and became symptomatic for nausea, which deteriorated into multiple episodes of vomiting; EMS called. Accompanying sx include worsening fatigue, increasing frequency of BMs, and SOB secondary to PNA. Pt was given non rebreather treatment and zofran en route, which improved her sx. S/p arrival to ED, pt's SOB has resolved. Denies pain, urinary sx, and f/c. Currently eating and drinking regularly. There are no other acute medical concerns at this time. PCP: Natalie Rodriguez MD 
 
Chart Review: Pt admitted from 12/29/18-1/1/19 for probable vasovagal event; none while admitted. CXR: Bilateral lung infiltrates and pleural effusions, superimposed on diffuse interstitial prominence. Rx Levaquin. Treated for chronic CHF: ECHO with EF 40-45% and severe hypokinesis; continued Lisinopril. Note written by Chadwick Doherty, as dictated by Anu Aj MD 3:25PM  
 
 
The history is provided by the patient and a relative. No  was used. Past Medical History:  
Diagnosis Date  Asthma  Cancer [facility]) 2011 LEFT breast   
 Diabetes ([facility] [address]  Hypertension Past Surgical History:  
Procedure Laterality Date  HX BREAST LUMPECTOMY  2011 LEFT breast   
 HX LAP CHOLECYSTECTOMY Family History:  
Problem Relation Age of Onset  Cancer Mother ? TYPE CA  
 Diabetes Father  Heart Disease Father  Hypertension Father  Lung Disease Paternal Grandfather  Asthma Neg Hx  Malignant Hyperthermia Neg Hx  Pseudocholinesterase Deficiency Neg Hx  Delayed Awakening Neg Hx  Post-op Nausea/Vomiting Neg Hx Social History Socioeconomic History  Marital status:  Spouse name: Not on file  Number of children: Not on file  Years of education: Not on file  Highest education level: Not on file Social Needs  Financial resource strain: Not on file  Food insecurity - worry: Not on file  Food insecurity - inability: Not on file  Transportation needs - medical: Not on file  Transportation needs - non-medical: Not on file Occupational History  Not on file Tobacco Use  Smoking status: Never Smoker  Smokeless tobacco: Never Used Substance and Sexual Activity  Alcohol use: No  
 Drug use: Not on file  Sexual activity: Not on file Other Topics Concern  Not on file Social History Narrative  Not on file ALLERGIES: Beta-blockers (beta-adrenergic blocking agts); Lorazepam; and Adhesive Review of Systems Constitutional: Positive for fatigue. Negative for activity change, appetite change, chills and fever. HENT: Negative for ear pain, facial swelling, sore throat and trouble swallowing. Eyes: Negative for pain, discharge and visual disturbance. Respiratory: Positive for shortness of breath. Negative for chest tightness and wheezing. Cardiovascular: Negative for chest pain and palpitations. Gastrointestinal: Positive for nausea. Negative for abdominal pain, blood in stool and vomiting. Genitourinary: Negative for difficulty urinating, flank pain and hematuria. Musculoskeletal: Negative for arthralgias, joint swelling, myalgias and neck pain. Skin: Negative for color change and rash. Neurological: Positive for syncope and light-headedness. Negative for dizziness, weakness, numbness and headaches. Hematological: Negative for adenopathy. Does not bruise/bleed easily. Psychiatric/Behavioral: Negative for behavioral problems, confusion and sleep disturbance. All other systems reviewed and are negative. Vitals:  
 01/04/19 1528 BP: 135/60 Pulse: 75 Resp: 22 Temp: 97.4 °F (36.3 °C) SpO2: 94% Weight: 44 kg (97 lb) Height: 5' 1\" (1.549 m) Physical Exam  
Constitutional: She is oriented to person, place, and time. She appears well-nourished. No distress. Frail HENT:  
Head: Normocephalic and atraumatic. Nose: Nose normal.  
Mouth/Throat: Oropharynx is clear and moist.  
Eyes: Conjunctivae and EOM are normal. Pupils are equal, round, and reactive to light. No scleral icterus. Neck: Normal range of motion. Neck supple. No JVD present. No tracheal deviation present. No thyromegaly present. No carotid bruits noted. Cardiovascular: Normal rate, normal heart sounds and intact distal pulses. An irregular rhythm present. Exam reveals no gallop and no friction rub. No murmur heard. Pulmonary/Chest: Effort normal. No respiratory distress. She has no wheezes. She has rales (b/l). She exhibits no tenderness. Not SOB. 100% on baseline 2 L O2. Talking in full sentences. No consolidation. Abdominal: Soft. Bowel sounds are normal. She exhibits no distension and no mass. There is no tenderness. There is no rebound and no guarding. Musculoskeletal: Normal range of motion. She exhibits no edema (peripheral) or tenderness. Lymphadenopathy:  
  She has no cervical adenopathy. Neurological: She is alert and oriented to person, place, and time. She has normal reflexes. No cranial nerve deficit. Coordination normal.  
Skin: Skin is warm and dry. No rash noted. No erythema. There is pallor. Psychiatric: She has a normal mood and affect. Her behavior is normal. Judgment and thought content normal.  
Nursing note and vitals reviewed. Note written by Chadwick Hawkins, as dictated by Romelle Riedel, MD 3:25 PM  
 
MDM Number of Diagnoses or Management Options Amount and/or Complexity of Data Reviewed Clinical lab tests: ordered and reviewed Tests in the radiology section of CPT®: ordered and reviewed Decide to obtain previous medical records or to obtain history from someone other than the patient: yes Obtain history from someone other than the patient: yes Review and summarize past medical records: yes Discuss the patient with other providers: yes Independent visualization of images, tracings, or specimens: yes Risk of Complications, Morbidity, and/or Mortality Presenting problems: high Diagnostic procedures: high Management options: minimal 
 
Patient Progress Patient progress: stable Procedures ED EKG interpretation: 
Rhythm: Bigeminy; Axis: normal; ST/T wave: normal 
Note written by Chadwick Hawkins, as dictated by Romelle Riedel, MD 4:55 PM 
 
Repeat ekg demonstrated more of an acute change . Rate dropped to 47 with ST T changes suggesting ischemic event. Troponin was elevated. Patient had said she wanted to die. She is DNR. Family is present and aware. Honored DNR. Patient was allowed to pass with comfort measures. Pronounced by Dr. Magalis Parks after I had left the department.

## 2019-01-05 LAB
ATRIAL RATE: 394 BPM
CALCULATED R AXIS, ECG10: 33 DEGREES
CALCULATED T AXIS, ECG11: -21 DEGREES
DIAGNOSIS, 93000: NORMAL
Q-T INTERVAL, ECG07: 706 MS
QRS DURATION, ECG06: 108 MS
QTC CALCULATION (BEZET), ECG08: 624 MS
VENTRICULAR RATE, ECG03: 47 BPM

## 2019-01-05 NOTE — PROGRESS NOTES
Responded to page for this pt's death in [address] [place] [address]. Pt's family were at bedside. Family was very accepting sharing that pt was \"ready to go\". CH facilitated space for life review to offer family moments of memorial and grief and possible closure and acceptance of this moment. Pastoral support through prayer and assurance of continued prayers. Affirmed ongoing availability of support. Kash Terry MDiv. Staff  Please call Ivonne-INOCENTE ([phone]) to page  if needed

## 2019-01-05 NOTE — ED NOTES
Called to bedside by nursing as patient had been gradually bradycardic, now asystole, patient noted to be unresponsive, apneic, pulseless, pupils fixed, family at bedside. NEENA called 19:08.

## 2019-01-05 NOTE — H&P
[address] HISTORY AND PHYSICAL Ronak Singh 
MR#: [id number] : 1922 ACCOUNT #: [de-identified] ADMIT DATE: 2019 PRIMARY CARE PHYSICIAN:  Edda Zendejas MD 
 
SOURCE OF INFORMATION:  The patient's daughter who was present at the bedside. CHIEF COMPLAINT:  Fatigue, weakness. HISTORY OF PRESENT ILLNESS:  This is a 71-year-old woman with a past medical history significant for chronic atrial fibrillation, chronic systolic congestive heart failure, type 2 diabetes, was in her usual state of health until a couple days ago when patient developed fatigue and weakness. The weakness is described as generalized weakness. Patient was admitted to this hospital from 2018-2019. The patient was treated for pneumonia, was discharged back to the assisted living facility in stable condition. The patient's daughter clearly stated that no aggressive treatment is required. The patient lives independently at the assisted living facility in her own apartment. The patient's weakness got worse today. They did not know what to do. Because of that, they brought the patient back to the emergency room. When the patient arrived at the emergency room, a chest x-ray is suggestive of pneumonia. Patient subsequently dropped a blood pressure in the emergency room. This was discussed with the family member. They stated that their goal is to make the patient comfort care. Patient was referred to the hospitalist service for admission for comfort care. PAST MEDICAL HISTORY:  Chronic atrial fibrillation, chronic systolic congestive heart failure, type 2 diabetes. ALLERGIES:  BETA BLOCKER, ATIVAN. MEDICATIONS:  Digoxin 0.125 mg daily, diltiazem 180 mg daily, Lasix 20 mg daily, Neurontin 300 mg daily in the morning, Neurontin 300 mg daily in the evening. Levaquin 250 mg daily,  lisinopril 10 mg daily,  Singular 10 mg daily in evening, Protonix 40 mg daily. FAMILY HISTORY:  This was reviewed. Mother had cancer, type of cancer is not known. Father had diabetes, heart disease, hypertension. PAST SURGICAL HISTORY:  This is significant for cholecystectomy, left breast lumpectomy. SOCIAL HISTORY:  No history of alcohol or tobacco abuse. REVIEW OF SYSTEMS: 
HEAD, EYES, EARS, NOSE AND THROAT:  This is positive for dizziness. No headache, no blurring of vision, no photophobia. RESPIRATORY:  Positive for shortness of breath. No cough, no hemoptysis. CARDIOVASCULAR: Positive for orthopnea. No chest pain. GASTROINTESTINAL:  Positive for nausea, no vomiting, no diarrhea, no constipation. GENITOURINARY:  No dysuria, no urgency and no frequency. All other systems are reviewed and they are negative. PHYSICAL EXAMINATION: 
GENERAL APPEARANCE:  The patient appeared ill, in moderate distress. VITAL SIGNS:  On arrival at the emergency room, temperature 97.4, pulse 75, respiratory rate 22, blood pressure 135/64, oxygen saturation 94% on 3 liters of oxygen. HEENT:  Normocephalic, atraumatic. EYES:  Normal eye movement. No redness, no drainage, no discharge. EARS:  Normal external ears with no obvious drainage. NOSE:  No deformity, no drainage. MOUTH AND THROAT:  No visible oral lesions. Dry oral mucosa. NECK:  Supple, no JVD, no thyromegaly. CHEST:  Few bilateral basilar crackles, no wheezing. HEART:  S1 and S2 irregularly irregular. No clinically appreciable murmur. ABDOMEN:  Soft, nontender. Normal bowel sounds. CENTRAL NERVOUS SYSTEM:  Alert, oriented to person. No gross focal neurological deficit. EXTREMITIES:  No edema. Pulses 2+ bilaterally. MUSCULOSKELETAL:  No obvious joint deformity or swelling. SKIN:  No active skin lesions seen in the exposed part of the body. PSYCHIATRIC:  Unable to assess her mood and affect. LYMPHATIC SYSTEM:  No cervical lymphadenopathy.  
 
DIAGNOSTIC DATA:  EKG shows atrial fibrillation, ST elevation suggestive of acute ischemia. Chest x-ray shows mild pulmonary edema with small bilateral pleural effusions, persistent asymmetric right lower lobe airspace disease, possibly representing pneumonia. ABG done in the emergency room, pH 7.364, pCO2 of 42.4, pO2 66. Bicarbonate 24.2, oxygen saturation 90%. Hematology:  WBC 12.9, hemoglobin 8.5, hematocrit 30.2, platelets are 927. Chemistry:  Sodium 136, potassium 3.4, chloride 103, CO2 25, glucose 178, BUN 33, creatinine 1.25, calcium 9.0, total bilirubin 0.3, ALT 25, AST 33, alkaline phosphatase 115, total protein 3.6, globulin at 4.0, lactic acid level 1.7. Troponin 0.81. 
 
ASSESSMENT: 
1.  Bacterial pneumonia. 2.  Acute on chronic systolic congestive heart failure. 3.  Persistent atrial fibrillation. 4.  Anemia. 5.  Type 2 diabetes. 6.  Suspected ST elevation myocardial infarction. PLAN:  The patient was very adamant about comfort care. Patient will be admitted to the hospital for comfort care. This includes morphine and Ativan. A palliative care consult will be requested to assist in carrying out comfort care. Paxton Minor MD 
 
  
RE/NADJA 
D: 01/04/2019 18:45 T: 01/05/2019 09:32 
JOB #: [id number] CC: Katia Jenkins MD

## 2019-01-05 NOTE — ED NOTES
Asystole on the monitor, no spontaneous respirations. Dr. Jon Nissen at bedside to pronounce patient.

## 2019-01-05 NOTE — PROGRESS NOTES
Admission Medication Reconciliation: 
 
Information obtained from: Chart review, discharge summary 1/1/19, Rx Query Significant PMH/Disease States:  
Past Medical History:  
Diagnosis Date  Asthma  Cancer [facility]) 2011 LEFT breast   
 Diabetes ([facility] [address]  Hypertension Chief Complaint for this Admission: Chief Complaint Patient presents with  Nausea  Fatigue Allergies:  Beta-blockers (beta-adrenergic blocking agts); Lorazepam; and Adhesive Prior to Admission Medications:  
Prior to Admission Medications Prescriptions Last Dose Informant Patient Reported? Taking?  
digoxin (LANOXIN) 0.125 mg tablet   No Yes Sig: Take 1 Tab by mouth every other day. dilTIAZem CD (CARTIA XT) 180 mg ER capsule   Yes Yes Sig: Take 180 mg by mouth daily. furosemide (LASIX) 20 mg tablet   No Yes Sig: Take 1 Tab by mouth daily. gabapentin (NEURONTIN) 300 mg capsule   Yes Yes Sig: Take 300 mg by mouth every morning.  
gabapentin (NEURONTIN) 300 mg capsule   Yes Yes Sig: Take 600 mg by mouth every evening. levoFLOXacin (LEVAQUIN) 250 mg tablet   No Yes Sig: Take 1 Tab by mouth every other day for 7 days. lisinopril (PRINIVIL, ZESTRIL) 10 mg tablet   Yes Yes Sig: Take 10 mg by mouth daily. montelukast (SINGULAIR) 10 mg tablet   Yes Yes Sig: Take 10 mg by mouth every evening. pantoprazole (PROTONIX) 40 mg tablet   Yes Yes Sig: Take 40 mg by mouth daily. Facility-Administered Medications: None Comments/Recommendations:  
 
Performed chart review and reviewed information listed in Rx Query to update PTA medication list. Patient was recently admitted to hospital 12/29/18 - 1/1/19. No changes to PTA medication list. Of note, levaquin was new to start upon discharge 1/1/19 with a dose in the hospital 1/1 prior to discharge (written for 5 tab). Regarding digoxin schedule, patient received dose in hospital 12/31 and receives medication every other day. Jina FultonD

## 2019-01-23 NOTE — DISCHARGE SUMMARY
[address]  DISCHARGE SUMMARY Michael Morrissey 
MR#: [id number] : 1922 ACCOUNT #: [de-identified] ADMIT DATE: 2019 DISCHARGE DATE: 2019 PRIMARY CARE PHYSICIAN:  Juan Brumfield MD   
 
ADMITTING DIAGNOSES:   
1. Bacterial pneumonia. 2.  Acute on chronic systolic congestive heart failure. 3.  Persistent atrial fibrillation. 4.  Anemia. 5.  Type 2 diabetes. 6.  Suspected ST elevation myocardial infarction. DISCHARGE DIAGNOSES:   
1. Acute on chronic respiratory failure. 2.  Bacterial pneumonia. 3.  Acute on chronic systolic congestive heart failure. 4.  Persistent atrial fibrillation. 5.  Anemia. 6.  Type 2 diabetes. 7.  Suspected ST elevation myocardial infarction. HOSPITAL COURSE:  This is a 68-year-old woman who was initially admitted to the hospital from 2018 to 2019. Patient was admitted and treated for suspected pneumonia, congestive heart failure. Was discharged back to assisted living facility in relatively stable condition. On the day of presentation at the emergency room, the patient developed shortness of breath and weakness. The patient lives at the independent section of the assisted living facility. Was brought to the emergency room because the family did not want the patient to die at the independent section of the assisted living facility. The patient lives by herself. When the patient arrived at the emergency room, evaluation revealed bacterial pneumonia as well as acute on chronic systolic congestive heart failure and respiratory failure. The hospitalist service was requested to admit the patient. The patient's clinical condition was discussed with the daughter at the bedside who was very adamant that what they wanted for the patient was not an aggressive treatment but comfort measures. Comfort measures were instituted.   Patient did not make it out of the emergency room.  Patient  shortly after the patient was admitted and was pronounced dead by the emergency room physician. As of the time of death, the patient's preliminary cause of death is most likely as a result of acute on chronic respiratory failure due to multiple etiological factors including acute on chronic congestive heart failure and the bacteria pneumonia. DISPOSITION:  Patient  MD ALMAS Fung/ 
D: 2019 06:23    
T: 2019 07:42 JOB #: [id number] CC: Alba Lopez MD

## 2020-10-07 NOTE — PROGRESS NOTES
Spoke with Alexander Suh Self, NP. Notified of troponin that has resulted at 0.40. Previous troponin WNL. Patient with no complaints of chest pain or shortness of breath. NP states to order and draw another troponin with AM labs and she will place a cardiology consult for the morning. Topical Clindamycin Counseling: Patient counseled that this medication may cause skin irritation or allergic reactions.  In the event of skin irritation, the patient was advised to reduce the amount of the drug applied or use it less frequently.   The patient verbalized understanding of the proper use and possible adverse effects of clindamycin.  All of the patient's questions and concerns were addressed.

## 2022-04-08 NOTE — PROGRESS NOTES
Occupational therapy  Orders received, saw patient for OT evaluation, recommend HH and back to [place]. Patient with question on using standard RW versus rollator that she already owns vs. SPC, would like to trial RW, paged PT to clear patient for appropriate AD before discharge if able. Full OT evaluation to follow. Ryan Carranza.  MS Nurys OTR/L Topical Sulfur Applications Pregnancy And Lactation Text: This medication is considered safe during pregnancy and breast feeding secondary to limited systemic absorption.

## 2022-05-21 NOTE — ED NOTES
The patient was discharged home by Dr Adarsh Piper  in stable condition with family. 20-May-2022 23:33

## 2023-05-19 NOTE — PROGRESS NOTES
Problem: Falls - Risk of  Goal: *Absence of Falls  Document Ismael Fall Risk and appropriate interventions in the flowsheet.    Outcome: Progressing Towards Goal  Fall Risk Interventions:  Mobility Interventions: Patient to call before getting OOB         Medication Interventions: Patient to call before getting OOB                  Problem: Pressure Injury - Risk of  Goal: *Prevention of pressure ulcer  Outcome: Progressing Towards Goal   01/06/18 0831   Wound Prevention and Protection Methods   Orientation of Wound Prevention Posterior   Location of Wound Prevention Sacrum/Coccyx   Dressing Present  No   Read Only, Retired: Wound Treatment (non-mechanical)   Wound Offloading (Prevention Methods) Bed, pressure reduction mattress Walk in / drive in

## (undated) DEVICE — Z DISCONTINUED NO SUB IDED SET EXTN W/ 4 W STPCOCK M SPIN LOK 36IN

## (undated) DEVICE — SNARE POLYP MIC OVL 13X2.4MM -- CAPTIVATOR BX/10

## (undated) DEVICE — ENDO CARRY-ON PROCEDURE KIT INCLUDES ENZYMATIC SPONGE, GAUZE, BIOHAZARD LABEL, TRAY, LUBRICANT, DIRTY SCOPE LABEL, WATER LABEL, TRAY, DRAWSTRING PAD, AND DEFENDO 4-PIECE KIT.: Brand: ENDO CARRY-ON PROCEDURE KIT

## (undated) DEVICE — SOLIDIFIER FLUID 3000 CC ABSORB

## (undated) DEVICE — KENDALL RADIOLUCENT FOAM MONITORING ELECTRODE -RECTANGULAR SHAPE: Brand: KENDALL

## (undated) DEVICE — CATH IV AUTOGRD BC BLU 22GA 25 -- INSYTE

## (undated) DEVICE — BAG BELONG PT PERS CLEAR HANDL

## (undated) DEVICE — 1200 GUARD II KIT W/5MM TUBE W/O VAC TUBE: Brand: GUARDIAN

## (undated) DEVICE — SET ADMIN 16ML TBNG L100IN 2 Y INJ SITE IV PIGGY BK DISP

## (undated) DEVICE — KIT IV STRT W CHLORAPREP PD 1ML

## (undated) DEVICE — BW-412T DISP COMBO CLEANING BRUSH: Brand: SINGLE USE COMBINATION CLEANING BRUSH

## (undated) DEVICE — NEEDLE HYPO 18GA L1.5IN PNK S STL HUB POLYPR SHLD REG BVL

## (undated) DEVICE — BITE BLK ENDOSCP AD 54FR GRN POLYETH ENDOSCP W STRP SLD

## (undated) DEVICE — CANN NASAL O2 CAPNOGRAPHY AD -- FILTERLINE

## (undated) DEVICE — SYRINGE MED 20ML STD CLR PLAS LUERLOCK TIP N CTRL DISP